# Patient Record
Sex: MALE | Race: WHITE | Employment: FULL TIME | URBAN - METROPOLITAN AREA
[De-identification: names, ages, dates, MRNs, and addresses within clinical notes are randomized per-mention and may not be internally consistent; named-entity substitution may affect disease eponyms.]

---

## 2018-09-07 ENCOUNTER — OFFICE VISIT (OUTPATIENT)
Dept: OTHER | Facility: OUTPATIENT CENTER | Age: 38
End: 2018-09-07
Payer: COMMERCIAL

## 2018-09-07 DIAGNOSIS — F91.9 DISTURBANCE OF CONDUCT: ICD-10-CM

## 2018-09-07 DIAGNOSIS — F33.0 MAJOR DEPRESSIVE DISORDER, RECURRENT EPISODE, MILD (H): Primary | ICD-10-CM

## 2018-09-07 ASSESSMENT — ANXIETY QUESTIONNAIRES
3. WORRYING TOO MUCH ABOUT DIFFERENT THINGS: SEVERAL DAYS
2. NOT BEING ABLE TO STOP OR CONTROL WORRYING: SEVERAL DAYS
7. FEELING AFRAID AS IF SOMETHING AWFUL MIGHT HAPPEN: SEVERAL DAYS
1. FEELING NERVOUS, ANXIOUS, OR ON EDGE: MORE THAN HALF THE DAYS
5. BEING SO RESTLESS THAT IT IS HARD TO SIT STILL: SEVERAL DAYS
GAD7 TOTAL SCORE: 7
6. BECOMING EASILY ANNOYED OR IRRITABLE: NOT AT ALL

## 2018-09-07 ASSESSMENT — PATIENT HEALTH QUESTIONNAIRE - PHQ9: 5. POOR APPETITE OR OVEREATING: SEVERAL DAYS
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
## 2018-09-11 ASSESSMENT — ANXIETY QUESTIONNAIRES: GAD7 TOTAL SCORE: 7

## 2018-09-11 ASSESSMENT — PATIENT HEALTH QUESTIONNAIRE - PHQ9: SUM OF ALL RESPONSES TO PHQ QUESTIONS 1-9: 6

## 2018-09-13 NOTE — PROGRESS NOTES
I did not personally see the patient but I have reviewed and agree with the assessment and plan as documented in this note.  Michelle Fowler, PhD -- Supervisor   Licensed Psychologist

## 2018-09-28 ENCOUNTER — OFFICE VISIT (OUTPATIENT)
Dept: OTHER | Facility: OUTPATIENT CENTER | Age: 38
End: 2018-09-28
Payer: COMMERCIAL

## 2018-09-28 DIAGNOSIS — F33.0 MAJOR DEPRESSIVE DISORDER, RECURRENT EPISODE, MILD (H): ICD-10-CM

## 2018-09-28 DIAGNOSIS — F91.9 DISTURBANCE OF CONDUCT: Primary | ICD-10-CM
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
## 2018-09-28 NOTE — MR AVS SNAPSHOT
After Visit Summary   2018    Shawn Camejo    MRN: [id number]           Patient Information     Date Of Birth          1980        Visit Information        Provider Department      2018 9:30 AM Lux Bravo, PhD [facility]        Today's Diagnoses     Unspecified disturbance of conduct    -  1    Major depressive disorder, recurrent episode, mild (H)           Follow-ups after your visit        Your next 10 appointments already scheduled     Oct 12, 2018  9:30 AM CDT   Individual Therapy 53+ minutes with Lux Bravo PhD   [facility] ([facility])    [address]  [address]  [facility] [address]   [phone]            Oct 26, 2018  9:30 AM CDT   Individual Therapy 53+ minutes with Lux Bravo PhD   [place] [facility] ([facility])    [address]  [address]  [facility] [address]   [phone]              Who to contact     Please call your clinic at [phone] to:    Ask questions about your health    Make or cancel appointments    Discuss your medicines    Learn about your test results    Speak to your doctor            Additional Information About Your Visit        MedAvailharTraNet'te Information     Motion Math is an electronic gateway that provides easy, online access to your medical records. With Motion Math, you can request a clinic appointment, read your test results, renew a prescription or communicate with your care team.     To sign up for [employer] Math visit the website at [email or web address]   You will be asked to enter the access code listed below, as well as some personal information. Please follow the directions to create your username and password.     Your access code is: [email or web address]  Expires: 2018  5:42 PM     Your access code will  in 90 days. If you need help or a new code, please contact your [facility] or call [phone] for  assistance.        Care EveryWhere ID     This is your Care EveryWhere ID. This could be used by other organizations to access your [place] medical records  [id number]         Blood Pressure from Last 3 Encounters:   08/14/01 110/80   04/27/01 112/72    Weight from Last 3 Encounters:   08/14/01 68.4 kg (150 lb 12 oz)   04/27/01 69.4 kg (153 lb)              We Performed the Following     Individual Psychotherapy (53+ min) [73631]        Primary Care Provider Office Phone # Fax #    Poli Mukherjee [phone]       NO INFO FOUND [address]  [place] [address]        Equal Access to Services     [facility] : Hadii aad ku hadasho Soomaali, waaxda luqadaha, qaybta kaalmada adeegyada, liya jamison . So [facility] [phone].    ATENCIÓN: Si habla español, tiene a kebede disposición servicios gratuitos de asistencia lingüística. Llame al [phone].    We comply with applicable federal civil rights laws and [place] laws. We do not discriminate on the basis of race, color, national origin, age, disability, sex, sexual orientation, or gender identity.            Thank you!     Thank you for choosing [place] [facility]  for your care. Our goal is always to provide you with excellent care. Hearing back from our patients is one way we can continue to improve our services. Please take a few minutes to complete the written survey that you may receive in the mail after your visit with us. Thank you!             Your Updated Medication List - Protect others around you: Learn how to safely use, store and throw away your medicines at www.disposemymeds.org.      Notice  As of 9/28/2018 11:59 PM    You have not been prescribed any medications.

## 2018-10-12 ENCOUNTER — OFFICE VISIT (OUTPATIENT)
Dept: OTHER | Facility: OUTPATIENT CENTER | Age: 38
End: 2018-10-12
Payer: COMMERCIAL

## 2018-10-12 DIAGNOSIS — F33.0 MAJOR DEPRESSIVE DISORDER, RECURRENT EPISODE, MILD (H): ICD-10-CM

## 2018-10-12 DIAGNOSIS — F91.8 CONDUCT DISORDER, UNDIFFERENTIATED TYPE: Primary | ICD-10-CM

## 2018-10-12 NOTE — MR AVS SNAPSHOT
After Visit Summary   10/12/2018    Shawn Camejo    MRN: [id number]           Patient Information     Date Of Birth          1980        Visit Information        Provider Department      10/12/2018 9:30 AM Lux Bravo PhD [facility]        Today's Diagnoses     Other Specified Disruptive, Impulsive-Control, and Conduct Disorder    -  1    Major depressive disorder, recurrent episode, mild (H)           Follow-ups after your visit        Your next 10 appointments already scheduled     Oct 26, 2018  9:30 AM CDT   Individual Therapy 53+ minutes with Lux Bravo PhD   [facility] ([facility])    [address]  [address]  [facility] [address]   [phone]            Nov 13, 2018  9:00 AM CST   Individual Therapy 53+ minutes with Lux Bravo PhD   [place] [facility] ([facility])    [address]  [address]  [facility] [address]   [phone]            Nov 27, 2018 10:00 AM CST   Individual Therapy 53+ minutes with Lux Bravo PhD   [place] [facility] ([facility])    [address]  [address]  [facility] [address]   [phone]            Dec 13, 2018 10:00 AM CST   Individual Therapy 53+ minutes with Lux Bravo PhD   [place] [facility] ([facility])    [address]  [address]  [facility] [address]   [phone]            Dec 28, 2018 10:00 AM CST   Individual Therapy 53+ minutes with Lux Bravo PhD   [place] [facility] ([facility])    [address]  [address]  [facility] [address]   [phone]              Who to contact     Please call your clinic at [phone] to:    Ask questions about your health    Make or cancel appointments    Discuss your medicines    Learn about your test results    Speak to your doctor            Additional Information About Your Visit        MyChart Information      DiscoveRX is an electronic gateway that provides easy, online access to your medical records. With DiscoveRX, you can request a clinic appointment, read your test results, renew a prescription or communicate with your care team.     To sign up for DiscoveRX visit the website at [email or web address]   You will be asked to enter the access code listed below, as well as some personal information. Please follow the directions to create your username and password.     Your access code is: [email or web address]  Expires: 2018  5:42 PM     Your access code will  in 90 days. If you need help or a new code, please contact your [facility] or call [phone] for assistance.        Care EveryWhere ID     This is your Care EveryWhere ID. This could be used by other organizations to access your [place] medical records  [id number]         Blood Pressure from Last 3 Encounters:   01 110/80   01 112/72    Weight from Last 3 Encounters:   01 68.4 kg (150 lb 12 oz)   01 69.4 kg (153 lb)              We Performed the Following     Individual Psychotherapy (53+ min) [09735]        Primary Care Provider Office Phone # Fax #    Poli Mukherjee [phone]       NO INFO FOUND [address] [facility]  [place] [address]        Equal Access to Services     [facility] : Hadii aad ku hadasho Soomaali, waaxda luqadaha, qaybta kaalmada adeegyada, liya jamison . So [facility] [phone].    ATENCIÓN: Si habla español, tiene a kebede disposición servicios gratuitos de asistencia lingüística. Llame al [phone].    We comply with applicable federal civil rights laws and [place] laws. We do not discriminate on the basis of race, color, national origin, age, disability, sex, sexual orientation, or gender identity.            Thank you!     Thank you for choosing [place] [facility]  for your care. Our goal is always to provide you with excellent care.  Hearing back from our patients is one way we can continue to improve our services. Please take a few minutes to complete the written survey that you may receive in the mail after your visit with us. Thank you!             Your Updated Medication List - Protect others around you: Learn how to safely use, store and throw away your medicines at www.disposemymeds.org.      Notice  As of 10/12/2018  8:14 PM    You have not been prescribed any medications.

## 2018-10-12 NOTE — PROGRESS NOTES
[place] [facility] -  Case Progress Note    Date of Service: October 12, 2018  Client Name: Shawn Camejo  YOB: 1980  MRN:  [id number]  Treating Provider: Lux Bravo, PhD  Type of Session: Individual  Present in Session: Patient only  Number of Minutes:  53    Current Symptoms/Status:  The client meets criteria for unspecified disturbance of conduct, which includes behavioral symptoms that cause clinically significant distress and impair social functioning. There appears to be evidence that client's behaviors are sexually compulsive in nature.     The client meets criteria for Major Depressive Disorder, Recurrent Episode, Mild, which includes experiencing anhedonia, worthlessness, hypersomnia, fatigue, and a depressed mood more days than not for a period of at least two weeks.      Progress Toward Treatment Goals:   The client continues to show interest and commitment towards receiving services at [facility].  The client reported difficulty identifying his boundaries.      Intervention: Modality and Response:  Client arrived to session about 20 minutes early. Fully oriented to person, place, and time. Client shared difficulty managing compulsive sexual behavior, and reported increased visits to strip clubs and greater difficulty abstaining from pornography. Client shared recent change in work schedule has contributed to increased hypersexuality, and reported stress at work has been greater since shifting from third to second shift. Client reported attending one SOCORRO group since our last session and shared feeling uncomfortable attending a mixed group. We identified four mens groups client can attend, and client agreed to attend at least three mens' groups per week prior to our next session. We reviewed stages of the sex addiction cycle and client shared experiencing preoccupation most frequently at work. We identified strategies for managing preoccupation such as texting his AA sponsor and  becoming acquainted with men in SOCORRO groups.      Assignment:  -Reflect on addictive cycle.  -Attend at least three SOCORRO groups per week prior to our next meeting.      Interactive Complexity:  N/A      Diagnosis:  312.9 Unspecified disturbance of conduct (F91.9)  296.31 Major Depressive Disorder, mild, recurrent (F33.0)      Plan / Need for Future Services:  Return for therapy in 2 weeks.         Lux Bravo, PhD  Postdoctoral Fellow

## 2018-10-26 ENCOUNTER — OFFICE VISIT (OUTPATIENT)
Dept: OTHER | Facility: OUTPATIENT CENTER | Age: 38
End: 2018-10-26
Payer: COMMERCIAL

## 2018-10-26 DIAGNOSIS — F41.1 GENERALIZED ANXIETY DISORDER: ICD-10-CM

## 2018-10-26 DIAGNOSIS — F33.0 MAJOR DEPRESSIVE DISORDER, RECURRENT EPISODE, MILD (H): ICD-10-CM

## 2018-10-26 DIAGNOSIS — F43.10 POSTTRAUMATIC STRESS DISORDER: ICD-10-CM

## 2018-10-26 DIAGNOSIS — F91.8 CONDUCT DISORDER, UNDIFFERENTIATED TYPE: Primary | ICD-10-CM

## 2018-10-26 NOTE — PROGRESS NOTES
[place] [facility] -  Case Progress Note    Date of Service: October 26, 2018  Client Name: Shawn Camejo  YOB: 1980  MRN:  [id number]  Treating Provider: Lux Bravo, PhD  Type of Session: Individual  Present in Session: Patient only  Number of Minutes:  60    Current Symptoms/Status:  The client meets criteria for Other Specified Disruptive, Impulsive-Control, and Conduct Disorder (hypersexuality), which includes behavioral symptoms that cause clinically significant distress and impair social functioning. There appears to be evidence that client's behaviors are sexually compulsive in nature.     The client meets criteria for Major Depressive Disorder, Recurrent Episode, Mild, which includes experiencing anhedonia, worthlessness, hypersomnia, fatigue, and a depressed mood more days than not for a period of at least two weeks.    Client endorses the following anxiety symptoms: difficulty controlling worry; restlessness or feeling keyed up or on edge; being easily fatigued; difficulty concentrating or mind going blank; irritability; muscle tension; and sleep disturbance (difficulty falling or staying asleep, or restless unsatisfying sleep).    The client meets criteria for Posttraumatic Stress Disorder, which includes dissociative reactions, persistent avoidance of distressing memories, negative alternations in cognitions and mood associated with the event, and marked alterations in arousal and reactivity associated with the event. The symptoms have been present for at least one month and cause clinically significant distress or impairment.      Progress Toward Treatment Goals:   The client continues to show interest and commitment towards receiving services at [facility].  The client reported gaining insight into how early traumatic experiences influences compulsive sexual behavior.      Intervention: Modality and Response:  Client arrived to session on time. Fully oriented to person, place,  "and time. Client shared difficulty managing compulsive sexual behavior, and reported increased visits to strip clubs and greater difficulty abstaining from pornography. Client reported not attending SCOORRO groups due to time management difficulties. Client connected perpetual \"fight or flight\" responses from childhood trauma to early experiences engaging in masturbation in order to return to physiological baseline. Client shared feeling \"disturbed\" by this insight and simultaneously reported gaining insight into the importance of disconnecting from individuals who continue to fuel this cycle, such as a prostitute he has been dating. Client shared feeling \"triggered\" meeting his ex-wife's friend at the grocery store and reported wanting to connect with her in order to leave the possibility of a sexual encounter open. Client reported ongoing distress surrounding his weekly visits to strip clubs. Reported feeling connected to AA sponsor who is also in recovery for compulsive sexual behaviors. Client reported he has been taking Sertraline 200mg as prescribed by outside psychiatrist who referred him to [facility] and was \"overwhelmed\" at the suggestion of meeting Bhargav Nolasco PA-C, for psychiatric referral. Client agreed to reconsider this referral at our next session.      Assignment:  -Engage social support systems.  -Attend at least three SOCORRO groups per week prior to our next meeting; consider requesting a day off work in order to attend one group and engage in self-care.      Interactive Complexity:  N/A      Diagnosis:  312.89 Other Specified Disruptive, Impulsive-Control, and Conduct Disorder (hypersexuality) (F91.8)  296.31 Major Depressive Disorder, mild, recurrent (F33.0)  300.02 Generalized Anxiety Disorder (F41.1)  309.81 Posttraumatic Stress Disorder (F43.10)      Plan / Need for Future Services:  Return for therapy in 2 weeks.         Lux Bravo, PhD  Postdoctoral Fellow  "

## 2018-10-26 NOTE — MR AVS SNAPSHOT
After Visit Summary   10/26/2018    Shawn Camejo    MRN: [id number]           Patient Information     Date Of Birth          1980        Visit Information        Provider Department      10/26/2018 9:30 AM Lux Bravo PhD [facility]        Today's Diagnoses     Other Specified Disruptive, Impulsive-Control, and Conduct Disorder (hypersexuality)    -  1    Major depressive disorder, recurrent episode, mild (H)        Generalized anxiety disorder        Posttraumatic stress disorder           Follow-ups after your visit        Your next 10 appointments already scheduled     Nov 13, 2018  9:00 AM CST   Individual Therapy 53+ minutes with Lux Bravo PhD   [facility] ([facility])    [address]  [address]  [facility] [address]   [phone]            Nov 27, 2018 10:00 AM CST   Individual Therapy 53+ minutes with Lux Bravo PhD   [facility] ([facility])    [address]  [address]  [facility] [address]   [phone]            Dec 13, 2018 10:00 AM CST   Individual Therapy 53+ minutes with Lux Bravo PhD   [facility] ([facility])    [address]  [address]  [facility] [address]   [phone]            Dec 28, 2018 10:00 AM CST   Individual Therapy 53+ minutes with Lux Bravo PhD   [facility] ([facility])    [address]  [address]  [facility] [address]   [phone]              Who to contact     Please call your clinic at [phone] to:    Ask questions about your health    Make or cancel appointments    Discuss your medicines    Learn about your test results    Speak to your doctor            Additional Information About Your Visit        Cazoodle Information     Cazoodle is an electronic gateway that provides easy, online access to your medical records. With Cazoodle, you can request a clinic  appointment, read your test results, renew a prescription or communicate with your care team.     To sign up for InCastt visit the website at [email or web address]   You will be asked to enter the access code listed below, as well as some personal information. Please follow the directions to create your username and password.     Your access code is: [email or web address]  Expires: 2019  9:26 AM     Your access code will  in 90 days. If you need help or a new code, please contact your [facility] or call [phone] for assistance.        Care EveryWhere ID     This is your Care EveryWhere ID. This could be used by other organizations to access your [place] medical records  [id number]         Blood Pressure from Last 3 Encounters:   01 110/80   01 112/72    Weight from Last 3 Encounters:   01 68.4 kg (150 lb 12 oz)   01 69.4 kg (153 lb)              We Performed the Following     Individual Psychotherapy (53+ min) [31888]        Primary Care Provider Office Phone # Fax #    Poli Mukherjee [phone]       NO INFO FOUND [address] [place] [address]  [place] [address]        Equal Access to Services     AYANA LARIOS AH: Hadii aad ku hadasho Soomaali, waaxda luqadaha, qaybta kaalmada adeegyada, waxay zarain hayrogern hero jamison . So [facility] [phone].    ATENCIÓN: Si habla español, tiene a kebede disposición servicios gratuitos de asistencia lingüística. Llame al [phone].    We comply with applicable federal civil rights laws and [place] laws. We do not discriminate on the basis of race, color, national origin, age, disability, sex, sexual orientation, or gender identity.            Thank you!     Thank you for choosing [place] [facility]  for your care. Our goal is always to provide you with excellent care. Hearing back from our patients is one way we can continue to improve our services. Please take a few minutes to complete the written survey  that you may receive in the mail after your visit with us. Thank you!             Your Updated Medication List - Protect others around you: Learn how to safely use, store and throw away your medicines at www.disposemymeds.org.      Notice  As of 10/26/2018 11:59 PM    You have not been prescribed any medications.

## 2018-11-13 ENCOUNTER — OFFICE VISIT (OUTPATIENT)
Dept: OTHER | Facility: OUTPATIENT CENTER | Age: 38
End: 2018-11-13
Payer: COMMERCIAL

## 2018-11-13 ENCOUNTER — TEAM CONFERENCE (OUTPATIENT)
Dept: OTHER | Facility: OUTPATIENT CENTER | Age: 38
End: 2018-11-13

## 2018-11-13 DIAGNOSIS — F91.8 OTHER CONDUCT DISORDERS: Primary | ICD-10-CM

## 2018-11-13 DIAGNOSIS — F43.10 POSTTRAUMATIC STRESS DISORDER: ICD-10-CM

## 2018-11-13 DIAGNOSIS — F33.0 MAJOR DEPRESSIVE DISORDER, RECURRENT EPISODE, MILD (H): ICD-10-CM

## 2018-11-13 DIAGNOSIS — F41.1 GENERALIZED ANXIETY DISORDER: ICD-10-CM

## 2018-11-13 NOTE — PROGRESS NOTES
[place] [facility] -  Case Progress Note    Date of Service: 11/13/2018  Client Name: Shawn Camejo  YOB: 1980  MRN:  [id number]  Treating Provider: Lux Bravo, PhD  Type of Session: Individual  Present in Session: Patient only  Number of Minutes:  45    Current Symptoms/Status:  The client meets criteria for Other Specified Disruptive, Impulsive-Control, and Conduct Disorder (hypersexuality), which includes behavioral symptoms that cause clinically significant distress and impair social functioning. There appears to be evidence that client's behaviors are sexually compulsive in nature.     The client meets criteria for Major Depressive Disorder, Recurrent Episode, Mild, which includes experiencing anhedonia, worthlessness, hypersomnia, fatigue, and a depressed mood more days than not for a period of at least two weeks.    Client endorses the following anxiety symptoms: difficulty controlling worry; restlessness or feeling keyed up or on edge; being easily fatigued; difficulty concentrating or mind going blank; irritability; muscle tension; and sleep disturbance (difficulty falling or staying asleep, or restless unsatisfying sleep).    The client meets criteria for Posttraumatic Stress Disorder, which includes dissociative reactions, persistent avoidance of distressing memories, negative alternations in cognitions and mood associated with the event, and marked alterations in arousal and reactivity associated with the event. The symptoms have been present for at least one month and cause clinically significant distress or impairment.      Progress Toward Treatment Goals:   The client continues to show interest and commitment towards receiving services at [facility].  The client reported difficulty controlling urges to act out and reported feeling disconnected from AA and [facility] sponsors.      Intervention: Modality and Response:  Client arrived to session 20 minutes late. Fully oriented to person,  "place, and time. Client shared ongoing difficulties with compulsive sexual behavior and reported watching Internet pornography, compulsively masturbating, and visiting strip clubs 2-3 times per week. He reported his AA sponsor is also struggling with sex addiction, and client shared feeling \"judged\" by his AA sponsor during recent interactions. Client shared wanting a sponsor to feel like a mentor and not a \"peer\", which is how he currently views his AA sponsor. Client reported having an SOCORRO sponsor but reported feeling disconnected from this person, stating his SOCORRO sponsor is like a \"grandfather\" who checks in periodically. We discussed strategies for feeling connected to specific members at his AA and SOCORRO meetings, and client reported a desire to look into new meetings. When asked about hobbies, client shared he had \"grandiose\" plans such as wanting to build a wooden boat after watching a YouTube video on the topic. Client agreed to meet with Bhargav Nolasco PA-C, for medical evaluation. Client agreed to begin meeting for individual therapy weekly in order to achieve greater stability.      Assignment:  -Attend various AA and SOCORRO groups and gauge connectedness to other members.  -Consider establishing relationship with new AA and SOCORRO sponsors.      Interactive Complexity:  N/A      Diagnosis:  312.89 Other Specified Disruptive, Impulsive-Control, and Conduct Disorder (hypersexuality) (F91.8)  296.31 Major Depressive Disorder, mild, recurrent (F33.0)  300.02 Generalized Anxiety Disorder (F41.1)  309.81 Posttraumatic Stress Disorder (F43.10)      Plan / Need for Future Services:  Return for therapy in 2 weeks. Meet with Bhargav Nolasco PA-C.     Lux Bravo, PhD  Postdoctoral Fellow  "

## 2018-11-13 NOTE — TELEPHONE ENCOUNTER
Medical/Psychiatric/Psyhological Consultation Form    Date:  2018     Name:  Shawn Camejo   Aliases:    :  1980  MRN:  [id number]    To:  Bhargav Nolasco PA-C    Reason for Consult:  Psychiatric Consultation:  Evaluation of indications for medication treatment    DSM Diagnosis:    312.89 Other Specified Disruptive, Impulsive-Control, and Conduct Disorder (hypersexuality) (F91.8)  296.31 Major Depressive Disorder, mild, recurrent (F33.0)  300.02 Generalized Anxiety Disorder (F41.1)  309.81 Posttraumatic Stress Disorder (F43.10)    Consult reason / list test to be given/interpretted and additional information:  Client has had difficulties controlling compulsive sexual behaviors over the last month or so. Reports going to strip clubs, hiring prostitutes, watching Internet pornography, and compulsive masturbation. Engages in CSB 2-3x/week. Reports persistent feelings of isolation from others, which exacerbates CSB. Referring for medication eval and to discuss possibility of Naltrexone.

## 2018-11-13 NOTE — MR AVS SNAPSHOT
After Visit Summary   11/13/2018    Shawn Camejo    MRN: [id number]           Patient Information     Date Of Birth          1980        Visit Information        Provider Department      11/13/2018 9:00 AM Lux Bravo PhD [facility]        Today's Diagnoses     Other Specified Disruptive, Impulsive-Control, and Conduct Disorder (hypersexuality)    -  1    Generalized anxiety disorder        Major depressive disorder, recurrent episode, mild (H)        Posttraumatic stress disorder           Follow-ups after your visit        Your next 10 appointments already scheduled     Nov 27, 2018 10:00 AM CST   Individual Therapy 53+ minutes with Lux Bravo PhD   [facility] ([facility])    [address]  [address]  [facility] [address]   [phone]            Dec 04, 2018  9:00 AM CST   Individual Therapy 53+ minutes with Lux Bravo PhD   [place] [facility] ([facility])    [address]  [address]  [facility] [address]   [phone]            Dec 13, 2018 10:00 AM CST   Individual Therapy 53+ minutes with Lux Bravo PhD   [place] [facility] ([facility])    [address]  [address]  [facility] [address]   [phone]            Dec 18, 2018 10:00 AM CST   Individual Therapy 53+ minutes with Lux Bravo PhD   [place] [facility] ([facility])    [address]  [address]  [facility] [address]   [phone]            Dec 28, 2018 10:00 AM CST   Individual Therapy 53+ minutes with Lux Bravo PhD   [place] [facility] ([facility])    [address]  [address]  [facility] [address]   [phone]            Jan 03, 2019 11:00 AM CST   Individual Therapy 53+ minutes with Lux Bravo PhD   [facility])    [address]  [address]  [facility] [address]    [phone]            2019  6:30 PM CST   PSYCH EVALUATION with Bhargav Nolasco PA-C   [facility] ([facility])    [address]  [address]  [facility] [address]   [phone]            Frankie 10, 2019 11:00 AM CST   Individual Therapy 53+ minutes with Lux Bravo PhD   [place] [facility] ([facility])    [address]  [address]  [facility] [address]   [phone]            2019 11:00 AM CST   Individual Therapy 53+ minutes with Lux Bravo PhD   [place] [facility] ([facility])    [address]  [address]  [facility] [address]   [phone]            2019 11:00 AM CST   Individual Therapy 53+ minutes with Lux Bravo PhD   [place] [facility] ([facility])    [address]  [address]  [facility] [address]   [phone]              Who to contact     Please call your clinic at [phone] to:    Ask questions about your health    Make or cancel appointments    Discuss your medicines    Learn about your test results    Speak to your doctor            Additional Information About Your Visit        "[employer]"hart Information     dELiAst is an electronic gateway that provides easy, online access to your medical records. With raksul, you can request a clinic appointment, read your test results, renew a prescription or communicate with your care team.     To sign up for dELiAst visit the website at [email or web address]   You will be asked to enter the access code listed below, as well as some personal information. Please follow the directions to create your username and password.     Your access code is: [email or web address]  Expires: 2019  8:26 AM     Your access code will  in 90 days. If you need help or a new code, please contact your [facility] or call [phone] for assistance.        Care EveryWhere ID     This  is your Care EveryWhere ID. This could be used by other organizations to access your [place] medical records  [id number]         Blood Pressure from Last 3 Encounters:   08/14/01 110/80   04/27/01 112/72    Weight from Last 3 Encounters:   08/14/01 68.4 kg (150 lb 12 oz)   04/27/01 69.4 kg (153 lb)              We Performed the Following     Individual Psychotherapy (38-52 min) [46360]        Primary Care Provider Office Phone # Fax #    Poli Mukherjee [phone]       NO INFO FOUND [address]  [place] [address]        Equal Access to Services     [facility]: Hadii aad ku hadasho Soomaali, waaxda luqadaha, qaybta kaalmada adeegyada, liya jamison . So [facility] [phone].    ATENCIÓN: Si habla español, tiene a kebede disposición servicios gratuitos de asistencia lingüística. Llame al [phone].    We comply with applicable federal civil rights laws and [place] laws. We do not discriminate on the basis of race, color, national origin, age, disability, sex, sexual orientation, or gender identity.            Thank you!     Thank you for choosing [place] [facility]  for your care. Our goal is always to provide you with excellent care. Hearing back from our patients is one way we can continue to improve our services. Please take a few minutes to complete the written survey that you may receive in the mail after your visit with us. Thank you!             Your Updated Medication List - Protect others around you: Learn how to safely use, store and throw away your medicines at www.disposemymeds.org.      Notice  As of 11/13/2018 11:59 PM    You have not been prescribed any medications.

## 2018-11-27 ENCOUNTER — OFFICE VISIT (OUTPATIENT)
Dept: OTHER | Facility: OUTPATIENT CENTER | Age: 38
End: 2018-11-27
Payer: COMMERCIAL

## 2018-11-27 DIAGNOSIS — F41.1 GENERALIZED ANXIETY DISORDER: ICD-10-CM

## 2018-11-27 DIAGNOSIS — F91.8 OTHER CONDUCT DISORDERS: Primary | ICD-10-CM

## 2018-11-27 DIAGNOSIS — F33.0 MAJOR DEPRESSIVE DISORDER, RECURRENT EPISODE, MILD (H): ICD-10-CM

## 2018-11-27 DIAGNOSIS — F43.10 POSTTRAUMATIC STRESS DISORDER: ICD-10-CM

## 2018-11-27 NOTE — PROGRESS NOTES
[place] [facility] -  Case Progress Note    Date of Service: 11/27/2018  Client Name: Shawn Camejo  YOB: 1980  MRN:  [id number]  Treating Provider: Lux Bravo, PhD  Type of Session: Individual  Present in Session: Patient only  Number of Minutes:  40    Health Maintenance Summary - Mental Health Treatment Plan       Status Date      Mental Health Tx Plan Q11 MOS Next Due 8/28/2019      Done 9/28/2018         Current Symptoms/Status:  The client meets criteria for Other Specified Disruptive, Impulsive-Control, and Conduct Disorder (hypersexuality), which includes behavioral symptoms that cause clinically significant distress and impair social functioning. There appears to be evidence that client's behaviors are sexually compulsive in nature.     The client meets criteria for Major Depressive Disorder, Recurrent Episode, Mild, which includes experiencing anhedonia, worthlessness, hypersomnia, fatigue, and a depressed mood more days than not for a period of at least two weeks.    Client endorses the following anxiety symptoms: difficulty controlling worry; restlessness or feeling keyed up or on edge; being easily fatigued; difficulty concentrating or mind going blank; irritability; muscle tension; and sleep disturbance (difficulty falling or staying asleep, or restless unsatisfying sleep).    The client meets criteria for Posttraumatic Stress Disorder, which includes dissociative reactions, persistent avoidance of distressing memories, negative alternations in cognitions and mood associated with the event, and marked alterations in arousal and reactivity associated with the event. The symptoms have been present for at least one month and cause clinically significant distress or impairment.      Progress Toward Treatment Goals:   The client continues to show interest and commitment towards receiving services at [facility].  The client reported difficulty controlling urges to act out over  "Thanksgiving.      Intervention: Modality and Response:  Client arrived to session 20 minutes late. Fully oriented to person, place, and time. Client shared ongoing difficulties with compulsive sexual behavior and reported watching Internet pornography, gambling at casinos, and hiring a prostitute over the Thanksgiving weekend. Client reported installing a parental blocker that blocks pornography on his phone at the end of the weekend and reported two members of an SOCORRO meeting control these blockers. Client reported this strategy has worked so far and he has not yet attempted to circumvent the blocker. Client reported difficulty identifying what \"healthy\" looks like for him and elaborated that he has never not experienced sex addiction. We revisited boundaries and he identified having sex outside of marriage as one of his boundaries. Client identified two SOCORRO meetings and two AA meetings to attend weekly, and client reported increased connection with his SOCORRO sponsor. Client reported intention of attending an SOCORRO meeting immediately after our session.      Assignment:  -Attend two AA meetings and two SOCORRO meetings each week.  -Consider establishing relationship with new AA sponsor.      Interactive Complexity:  N/A      Diagnosis:  312.89 Other Specified Disruptive, Impulsive-Control, and Conduct Disorder (hypersexuality) (F91.8)  296.31 Major Depressive Disorder, mild, recurrent (F33.0)  300.02 Generalized Anxiety Disorder (F41.1)  309.81 Posttraumatic Stress Disorder (F43.10)      Plan / Need for Future Services:  Return for therapy in one week.     Lux Bravo, PhD  Postdoctoral Fellow  "

## 2018-11-27 NOTE — MR AVS SNAPSHOT
After Visit Summary   11/27/2018    Shawn Camejo    MRN: [id number]           Patient Information     Date Of Birth          1980        Visit Information        Provider Department      11/27/2018 10:00 AM Lux Bravo PhD [facility]        Today's Diagnoses     Other Specified Disruptive, Impulsive-Control, and Conduct Disorder (hypersexuality)    -  1    Major depressive disorder, recurrent episode, mild (H)        Generalized anxiety disorder        Posttraumatic stress disorder           Follow-ups after your visit        Your next 10 appointments already scheduled     Dec 04, 2018  9:00 AM CST   Individual Therapy 53+ minutes with Lux Bravo PhD   [facility] ([facility])    [address]  [address]  [facility] [address]   [phone]            Dec 13, 2018 10:00 AM CST   Individual Therapy 53+ minutes with Lux Bravo PhD   [facility] ([facility])    [address]  [address]  [facility] [address]   [phone]            Dec 18, 2018 10:00 AM CST   Individual Therapy 53+ minutes with Lux Bravo PhD   [facility] ([facility])    [address]  [address]  [facility] [address]   [phone]            Dec 28, 2018 10:00 AM CST   Individual Therapy 53+ minutes with Lux Bravo PhD   [facility] ([facility])    [address]  [address]  [facility] [address]   [phone]            Jan 03, 2019 11:00 AM CST   Individual Therapy 53+ minutes with Lux Bravo PhD   [facility] ([facility])    [address]  [address]  [facility] [address]   [phone]            Jan 03, 2019  6:30 PM CST   PSYCH EVALUATION with Bhargav Nolasco PA-C   [facility] ([facility])    [address]  [address]  [facility] [address]   [phone]             Frankie 10, 2019 11:00 AM CST   Individual Therapy 53+ minutes with Lux Bravo PhD   [facility] ([facility])    [address]  [address]  [facility] [address]   [phone]            2019 11:00 AM CST   Individual Therapy 53+ minutes with Lux Bravo PhD   [facility] ([facility])    [address]  [address]  [facility] [address]   [phone]            2019 11:00 AM CST   Individual Therapy 53+ minutes with Lux Bravo PhD   [facility] ([facility])    [address]  [address]  [facility] [address]   [phone]            2019 11:00 AM CST   Individual Therapy 53+ minutes with Lux Bravo PhD   [facility] ([facility])    [address]  [address]  [facility] [address]   [phone]              Who to contact     Please call your clinic at [phone] to:    Ask questions about your health    Make or cancel appointments    Discuss your medicines    Learn about your test results    Speak to your doctor            Additional Information About Your Visit        BTI Systemshart Information     INFIMET is an electronic gateway that provides easy, online access to your medical records. With INFIMET, you can request a clinic appointment, read your test results, renew a prescription or communicate with your care team.     To sign up for [facility] visit the website at [email or web address]   You will be asked to enter the access code listed below, as well as some personal information. Please follow the directions to create your username and password.     Your access code is: [email or web address]  Expires: 2019  8:26 AM     Your access code will  in 90 days. If you need help or a new code, please contact your [facility] or call [phone] for assistance.        Care EveryWhere ID     This is  your Care EveryWhere ID. This could be used by other organizations to access your [facility] medical records  [id number]         Blood Pressure from Last 3 Encounters:   08/14/01 110/80   04/27/01 112/72    Weight from Last 3 Encounters:   08/14/01 68.4 kg (150 lb 12 oz)   04/27/01 69.4 kg (153 lb)              We Performed the Following     Individual Psychotherapy (38-52 min) [02347]        Primary Care Provider Office Phone # Fax #    Poli Mukherjee [phone]       NO INFO FOUND [address]  [place] [address]        Equal Access to Services     [facility]: Hadii aad ku hadasho Soomaali, waaxda luqadaha, qaybta kaalmada adeegyada, liya argueta adeparadise jamison . So [facility] [phone].    ATENCIÓN: Si habla español, tiene a kebede disposición servicios gratuitos de asistencia lingüística. Llame al [phone].    We comply with applicable federal civil rights laws and [place] laws. We do not discriminate on the basis of race, color, national origin, age, disability, sex, sexual orientation, or gender identity.            Thank you!     Thank you for choosing [facility]  for your care. Our goal is always to provide you with excellent care. Hearing back from our patients is one way we can continue to improve our services. Please take a few minutes to complete the written survey that you may receive in the mail after your visit with us. Thank you!             Your Updated Medication List - Protect others around you: Learn how to safely use, store and throw away your medicines at www.disposemymeds.org.      Notice  As of 11/27/2018 11:59 PM    You have not been prescribed any medications.

## 2018-12-04 ENCOUNTER — OFFICE VISIT (OUTPATIENT)
Dept: OTHER | Facility: OUTPATIENT CENTER | Age: 38
End: 2018-12-04
Payer: COMMERCIAL

## 2018-12-04 DIAGNOSIS — F41.1 GENERALIZED ANXIETY DISORDER: ICD-10-CM

## 2018-12-04 DIAGNOSIS — F91.8 OTHER CONDUCT DISORDERS: Primary | ICD-10-CM

## 2018-12-04 DIAGNOSIS — F33.0 MAJOR DEPRESSIVE DISORDER, RECURRENT EPISODE, MILD (H): ICD-10-CM

## 2018-12-04 DIAGNOSIS — F43.10 POSTTRAUMATIC STRESS DISORDER: ICD-10-CM

## 2018-12-04 NOTE — MR AVS SNAPSHOT
After Visit Summary   12/4/2018    Shawn Cmaejo    MRN: [id number]           Patient Information     Date Of Birth          1980        Visit Information        Provider Department      12/4/2018 9:00 AM Lux Bravo PhD [facility]        Today's Diagnoses     Other Specified Disruptive, Impulsive-Control, and Conduct Disorder (hypersexuality)    -  1    Posttraumatic stress disorder        Generalized anxiety disorder        Major depressive disorder, recurrent episode, mild (H)           Follow-ups after your visit        Your next 10 appointments already scheduled     Dec 13, 2018 10:00 AM CST   Individual Therapy 53+ minutes with Lux Bravo PhD   [facility]    [address]  [address]  [facility] [address]   [phone]            Dec 18, 2018 10:00 AM CST   Individual Therapy 53+ minutes with Lux Bravo PhD   [facility] [facility] ([facility])    [address]  [address]  [facility] [address]   [phone]            Dec 28, 2018 10:00 AM CST   Individual Therapy 53+ minutes with Lux Bravo PhD   [facility] ([facility]    [address]  [address]  [facility] [address]   [phone]            Jan 03, 2019 11:00 AM CST   Individual Therapy 53+ minutes with Lux Bravo PhD   [facility] ([facility])    [address]  [address]  [facility] [address]   [phone]            Jan 03, 2019  6:30 PM CST   PSYCH EVALUATION with Bhargav Nolasco PA-C   [facility]    [address]  [address]  [facility] [address]   [phone]            Frankie 10, 2019 11:00 AM CST   Individual Therapy 53+ minutes with Lux Bravo PhD   [facility]    [address]  [address]  [facility] [address]   [phone]             2019 11:00 AM CST   Individual Therapy 53+ minutes with Lux Bravo PhD   [place] [facility] ([facility])    [address]  [address]  [facility] [address]   [phone]            2019 11:00 AM CST   Individual Therapy 53+ minutes with Lux Bravo PhD   [facility] ([facility])    [address]  [address]  [facility] [address]   [phone]            2019 11:00 AM CST   Individual Therapy 53+ minutes with Lux Bravo PhD   [facility] ([facility])    [address]  [address]  [facility] [address]   [phone]              Who to contact     Please call your clinic at [phone] to:    Ask questions about your health    Make or cancel appointments    Discuss your medicines    Learn about your test results    Speak to your doctor            Additional Information About Your Visit        Smacktive.comhart Information     Nimsoft is an electronic gateway that provides easy, online access to your medical records. With Nimsoft, you can request a clinic appointment, read your test results, renew a prescription or communicate with your care team.     To sign up for [employer] visit the website at [email or web address]   You will be asked to enter the access code listed below, as well as some personal information. Please follow the directions to create your username and password.     Your access code is: [email or web address]  Expires: 2019  8:26 AM     Your access code will  in 90 days. If you need help or a new code, please contact your [facility] or call [phone] for assistance.        Care EveryWhere ID     This is your Care EveryWhere ID. This could be used by other organizations to access your [facility] medical records  [id number]         Blood Pressure from Last 3 Encounters:   01 110/80   01 112/72    Weight from Last 3  Encounters:   08/14/01 68.4 kg (150 lb 12 oz)   04/27/01 69.4 kg (153 lb)              We Performed the Following     Individual Psychotherapy (53+ min) [[id number]]        Primary Care Provider Office Phone # Fax #    Poli Mukherjee [phone]       NO INFO FOUND [address]  [place] [address]        Equal Access to Services     [facility]: Hadii aad ku hadasho Soomaali, waaxda luqadaha, qaybta kaalmada adeegyada, waxay idiin hayaan adeeg kharash la'aan ah. So [facility] [phone].    ATENCIÓN: Si habla español, tiene a kebede disposición servicios gratuitos de asistencia lingüística. Llame al [phone].    We comply with applicable federal civil rights laws and [place] laws. We do not discriminate on the basis of race, color, national origin, age, disability, sex, sexual orientation, or gender identity.            Thank you!     Thank you for choosing [facility]  for your care. Our goal is always to provide you with excellent care. Hearing back from our patients is one way we can continue to improve our services. Please take a few minutes to complete the written survey that you may receive in the mail after your visit with us. Thank you!             Your Updated Medication List - Protect others around you: Learn how to safely use, store and throw away your medicines at www.disposemymeds.org.      Notice  As of 12/4/2018 11:39 AM    You have not been prescribed any medications.
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
## 2018-12-13 ENCOUNTER — OFFICE VISIT (OUTPATIENT)
Dept: OTHER | Facility: OUTPATIENT CENTER | Age: 38
End: 2018-12-13
Payer: COMMERCIAL

## 2018-12-13 DIAGNOSIS — F41.1 GENERALIZED ANXIETY DISORDER: ICD-10-CM

## 2018-12-13 DIAGNOSIS — F33.0 MAJOR DEPRESSIVE DISORDER, RECURRENT EPISODE, MILD (H): ICD-10-CM

## 2018-12-13 DIAGNOSIS — F91.8 OTHER CONDUCT DISORDERS: Primary | ICD-10-CM

## 2018-12-13 DIAGNOSIS — F43.10 POSTTRAUMATIC STRESS DISORDER: ICD-10-CM

## 2018-12-13 NOTE — PROGRESS NOTES
[facility] -  Case Progress Note    Date of Service: 12/13/18  Client Name: Shawn Camejo  YOB: 1980  MRN:  [id number]  Treating Provider: Lux Bravo, PhD  Type of Session: Individual  Present in Session: Patient only  Number of Minutes:  55    Health Maintenance Summary - Mental Health Treatment Plan       Status Date      Mental Health Tx Plan Q11 MOS Next Due 8/28/2019      Done 9/28/2018         Current Symptoms/Status:  The client meets criteria for Other Specified Disruptive, Impulsive-Control, and Conduct Disorder (hypersexuality), which includes behavioral symptoms that cause clinically significant distress and impair social functioning. There appears to be evidence that client's behaviors are sexually compulsive in nature.     The client meets criteria for Major Depressive Disorder, Recurrent Episode, Mild, which includes experiencing anhedonia, worthlessness, hypersomnia, fatigue, and a depressed mood more days than not for a period of at least two weeks.    Client endorses the following anxiety symptoms: difficulty controlling worry; restlessness or feeling keyed up or on edge; being easily fatigued; difficulty concentrating or mind going blank; irritability; muscle tension; and sleep disturbance (difficulty falling or staying asleep, or restless unsatisfying sleep).    The client meets criteria for Posttraumatic Stress Disorder, which includes dissociative reactions, persistent avoidance of distressing memories, negative alternations in cognitions and mood associated with the event, and marked alterations in arousal and reactivity associated with the event. The symptoms have been present for at least one month and cause clinically significant distress or impairment.      Progress Toward Treatment Goals:   The client continues to show interest and commitment towards receiving services at [facility].  The client has an upcoming appointment with Bhargav Nolasco PA-C, on  "1/3/19.      Intervention: Modality and Response:  Client arrived to session on time. Fully oriented to person, place, and time. Therapist employed client-centered and CBT interventions to explore client's compulsive sexual behaviors and ongoing mental health concerns. Client reported a \"good and bad\" week as evidenced by occasionally withholding from CSB behaviors when triggered but still viewing pornography on his phone on a regular basis. Client shared a yearning to connect with his family, most of who he has not spoken to in over eight years, and we examined underlying codependent processes facilitating this thought process. He shared feeling copious amounts of \"shame\" from most family members growing up. Client reported not completing homework assignment of asking HR to adapt his schedule so that he may attend CSB group. However, client appeared more confident in his ability to approach HR this week, and therapist wrote down the times of all three CSB groups as a reminder. Client has an appointment with Bhargav Nolasco PA-C, on 1/3/19, and still needs to ask work for time off to attend this appointment.      Assignment:  -Continue attending [facility] meetings.   -Ask HR or supervisor for permission to attend meeting with Bhargav Nolasco PA-C, and weekly CSB groups.      Interactive Complexity:  N/A      Diagnosis:  312.89 Other Specified Disruptive, Impulsive-Control, and Conduct Disorder (hypersexuality) (F91.8)  296.31 Major Depressive Disorder, mild, recurrent (F33.0)  300.02 Generalized Anxiety Disorder (F41.1)  309.81 Posttraumatic Stress Disorder (F43.10)      Plan / Need for Future Services:  Return for therapy in one week.     Lux Bravo, PhD  Postdoctoral Fellow  "

## 2018-12-18 ENCOUNTER — OFFICE VISIT (OUTPATIENT)
Dept: OTHER | Facility: OUTPATIENT CENTER | Age: 38
End: 2018-12-18
Payer: COMMERCIAL

## 2018-12-18 DIAGNOSIS — F43.10 POSTTRAUMATIC STRESS DISORDER: ICD-10-CM

## 2018-12-18 DIAGNOSIS — F91.8 OTHER CONDUCT DISORDERS: Primary | ICD-10-CM

## 2018-12-18 DIAGNOSIS — F33.0 MAJOR DEPRESSIVE DISORDER, RECURRENT EPISODE, MILD (H): ICD-10-CM

## 2018-12-18 DIAGNOSIS — F41.1 GENERALIZED ANXIETY DISORDER: ICD-10-CM

## 2018-12-18 NOTE — PROGRESS NOTES
[facility] -  Case Progress Note    Date of Service: 12/18/18  Client Name: Shawn Camejo  YOB: 1980  MRN:  [id number]  Treating Provider: Lux Bravo, PhD  Type of Session: Individual  Present in Session: Patient only  Number of Minutes:  53    Health Maintenance Summary - Mental Health Treatment Plan       Status Date      Mental Health Tx Plan Q11 MOS Next Due 8/28/2019      Done 9/28/2018         Current Symptoms/Status:  The client meets criteria for Other Specified Disruptive, Impulsive-Control, and Conduct Disorder (hypersexuality), which includes behavioral symptoms that cause clinically significant distress and impair social functioning. There appears to be evidence that client's behaviors are sexually compulsive in nature.     The client meets criteria for Major Depressive Disorder, Recurrent Episode, Mild, which includes experiencing anhedonia, worthlessness, hypersomnia, fatigue, and a depressed mood more days than not for a period of at least two weeks.    Client endorses the following anxiety symptoms: difficulty controlling worry; restlessness or feeling keyed up or on edge; being easily fatigued; difficulty concentrating or mind going blank; irritability; muscle tension; and sleep disturbance (difficulty falling or staying asleep, or restless unsatisfying sleep).    The client meets criteria for Posttraumatic Stress Disorder, which includes dissociative reactions, persistent avoidance of distressing memories, negative alternations in cognitions and mood associated with the event, and marked alterations in arousal and reactivity associated with the event. The symptoms have been present for at least one month and cause clinically significant distress or impairment.      Progress Toward Treatment Goals:   The client continues to show interest and commitment towards receiving services at [facility].  The client has an upcoming appointment with Bhargav Nolasco PA-C, on  "1/3/19.      Intervention: Modality and Response:  Client arrived to session on time. Fully oriented to person, place, and time. Therapist employed client-centered and CBT interventions to explore client's compulsive sexual behaviors and ongoing mental health concerns. Client reported a \"really bad\" week marked by hiring a prostitute hours after our last session, visiting a massage parlor, and regularly viewing Internet pornography. He reported excessive \"guilt and shame\" surrounding these behaviors, and therapist named his guilt and shame process as justifying continued CSB. Client reported not asking work for time off to attend CSB group, and we identified a strategy for how he will ask HR off from work. We role played client asking HR off from work, and client shared feeling comfortable with this role play. Client reported plan to attend [facility] group immediately after session and intends to get to work early today to ask HR to adjust his Monday schedule so that he may attend the Monday CSB group.      Assignment:  -Continue attending [facility] meetings.   -Ask HR or supervisor for permission to attend meeting with Bhargav Nolasco PA-C, and weekly CSB groups.      Interactive Complexity:  N/A      Diagnosis:  312.89 Other Specified Disruptive, Impulsive-Control, and Conduct Disorder (hypersexuality) (F91.8)  296.31 Major Depressive Disorder, mild, recurrent (F33.0)  300.02 Generalized Anxiety Disorder (F41.1)  309.81 Posttraumatic Stress Disorder (F43.10)      Plan / Need for Future Services:  Return for therapy in one week.     Lux Bravo, PhD  Postdoctoral Fellow  "

## 2018-12-28 ENCOUNTER — OFFICE VISIT (OUTPATIENT)
Dept: OTHER | Facility: OUTPATIENT CENTER | Age: 38
End: 2018-12-28
Payer: COMMERCIAL

## 2018-12-28 DIAGNOSIS — F41.1 GENERALIZED ANXIETY DISORDER: ICD-10-CM

## 2018-12-28 DIAGNOSIS — F33.0 MAJOR DEPRESSIVE DISORDER, RECURRENT EPISODE, MILD (H): ICD-10-CM

## 2018-12-28 DIAGNOSIS — F91.8 OTHER CONDUCT DISORDERS: Primary | ICD-10-CM

## 2018-12-28 DIAGNOSIS — F43.10 POSTTRAUMATIC STRESS DISORDER: ICD-10-CM

## 2018-12-28 NOTE — PROGRESS NOTES
[facility] -  Case Progress Note    Date of Service: 12/28/18  Client Name: Shawn Camejo  YOB: 1980  MRN:  [id number]  Treating Provider: Lux Bravo, PhD  Type of Session: Individual  Present in Session: Patient only  Number of Minutes:  53    Health Maintenance Summary - Mental Health Treatment Plan       Status Date      Mental Health Tx Plan Q11 MOS Next Due 8/28/2019      Done 9/28/2018         Current Symptoms/Status:  The client meets criteria for Other Specified Disruptive, Impulsive-Control, and Conduct Disorder (hypersexuality), which includes behavioral symptoms that cause clinically significant distress and impair social functioning. There appears to be evidence that client's behaviors are sexually compulsive in nature.     The client meets criteria for Major Depressive Disorder, Recurrent Episode, Mild, which includes experiencing anhedonia, worthlessness, hypersomnia, fatigue, and a depressed mood more days than not for a period of at least two weeks.    Client endorses the following anxiety symptoms: difficulty controlling worry; restlessness or feeling keyed up or on edge; being easily fatigued; difficulty concentrating or mind going blank; irritability; muscle tension; and sleep disturbance (difficulty falling or staying asleep, or restless unsatisfying sleep).    The client meets criteria for Posttraumatic Stress Disorder, which includes dissociative reactions, persistent avoidance of distressing memories, negative alternations in cognitions and mood associated with the event, and marked alterations in arousal and reactivity associated with the event. The symptoms have been present for at least one month and cause clinically significant distress or impairment.      Progress Toward Treatment Goals:   The client continues to show interest and commitment towards receiving services at [facility].  The client has an upcoming appointment with Bhargav Nolasco PA-C, on  "1/3/19.      Intervention: Modality and Response:  Client arrived to session on time. Fully oriented to person, place, and time. Therapist employed client-centered and CBT interventions to explore client's compulsive sexual behaviors and ongoing mental health concerns. Client reported continued boundary violations and acting out over the last week, marked by hiring a prostitute, going to strip clubs twice, gambling most of his bank account away, and consuming alcohol. Client reported he is \"not taking my recovery seriously\" and interventions were aimed at exploring client's motivation for stopping addictive behaviors. Client reported he will wind up \"dying\" if he does not get his addictions under control but simultaneously reported difficulty giving up his addictions because of the pleasure that comes with acting out. Client reported not following up with work to ask for time off to attend CSB group, and he reported intention of doing this when he returns to work on 1/2/19. Client has an upcoming meeting with Bhargav Nolasco PA-C, on 1/3/19, and we discussed the benefit of possibly beginning Naltrexone.      Assignment:  -Attend [employer] or AA meeting every day until he returns to work on 1/2/19.  -Ask HR or supervisor for permission to attend meeting with Bhargav Nolasco PA-C, and weekly CSB groups.      Interactive Complexity:  N/A      Diagnosis:  312.89 Other Specified Disruptive, Impulsive-Control, and Conduct Disorder (hypersexuality) (F91.8)  296.31 Major Depressive Disorder, mild, recurrent (F33.0)  300.02 Generalized Anxiety Disorder (F41.1)  309.81 Posttraumatic Stress Disorder (F43.10)      Plan / Need for Future Services:  Return for therapy in one week.     Lux Bravo, PhD  Postdoctoral Fellow  "

## 2019-01-03 ENCOUNTER — OFFICE VISIT (OUTPATIENT)
Dept: OTHER | Facility: OUTPATIENT CENTER | Age: 39
End: 2019-01-03
Payer: COMMERCIAL

## 2019-01-03 DIAGNOSIS — F43.10 POSTTRAUMATIC STRESS DISORDER: ICD-10-CM

## 2019-01-03 DIAGNOSIS — F91.8 OTHER CONDUCT DISORDERS: Primary | ICD-10-CM

## 2019-01-03 DIAGNOSIS — F33.0 MAJOR DEPRESSIVE DISORDER, RECURRENT EPISODE, MILD (H): ICD-10-CM

## 2019-01-03 DIAGNOSIS — F41.1 GENERALIZED ANXIETY DISORDER: ICD-10-CM

## 2019-01-03 RX ORDER — NALTREXONE HYDROCHLORIDE 50 MG/1
50 TABLET, FILM COATED ORAL DAILY
Qty: 90 TABLET | Refills: 3 | Status: SHIPPED | OUTPATIENT
Start: 2019-01-03 | End: 2019-01-03 | Stop reason: DRUGHIGH

## 2019-01-03 RX ORDER — NALTREXONE HYDROCHLORIDE 50 MG/1
TABLET, FILM COATED ORAL
Qty: 30 TABLET | Refills: 3 | Status: SHIPPED | OUTPATIENT
Start: 2019-01-03 | End: 2019-08-22

## 2019-01-03 NOTE — PROGRESS NOTES
[facility] -  Case Progress Note    Date of Service: 1/03/19  Client Name: Shawn Camejo  YOB: 1980  MRN:  [id number]  Treating Provider: Lux Bravo, PhD  Type of Session: Individual  Present in Session: Patient only  Number of Minutes:  53    Health Maintenance Summary - Mental Health Treatment Plan       Status Date      Mental Health Tx Plan Q11 MOS Next Due 8/28/2019      Done 9/28/2018         Current Symptoms/Status:  The client meets criteria for Other Specified Disruptive, Impulsive-Control, and Conduct Disorder (hypersexuality), which includes behavioral symptoms that cause clinically significant distress and impair social functioning. There appears to be evidence that client's behaviors are sexually compulsive in nature.     The client meets criteria for Major Depressive Disorder, Recurrent Episode, Mild, which includes experiencing anhedonia, worthlessness, hypersomnia, fatigue, and a depressed mood more days than not for a period of at least two weeks.    Client endorses the following anxiety symptoms: difficulty controlling worry; restlessness or feeling keyed up or on edge; being easily fatigued; difficulty concentrating or mind going blank; irritability; muscle tension; and sleep disturbance (difficulty falling or staying asleep, or restless unsatisfying sleep).    The client meets criteria for Posttraumatic Stress Disorder, which includes dissociative reactions, persistent avoidance of distressing memories, negative alternations in cognitions and mood associated with the event, and marked alterations in arousal and reactivity associated with the event. The symptoms have been present for at least one month and cause clinically significant distress or impairment.      Progress Toward Treatment Goals:   The client continues to show interest and commitment towards receiving services at [facility].  The client has an upcoming appointment with Bhargav Nolasco PA-C, on  "1/3/19.  The client reported decreased CSB over the last week.      Intervention: Modality and Response:  Client fully oriented to person, place, and time. Therapist employed client-centered and CBT interventions to explore client's compulsive sexual behaviors and ongoing mental health concerns. Client shared progress toward goals as evidenced by attending numerous [employer] meetings and spending an entire day with his [employer] sponsor at meetings, the gym, and a coffee shop. He reported anxiety that he will soon relapse given his progress over the last week, and we identified protective factors should he anticipate a relapse. Client identified numerous individuals in his [employer] groups as people he can reach out to. Client reported not following up with work to ask for time off to attend CSB group, and acknowledged during this session his tendency to \"put it off\" because it feels like a large laurel. We processed difficulty taking this step, and he reported increased confidence that he can have a conversation with HR tomorrow about beginning the Monday CSB group in February.      Assignment:  -Remain connected with support from SOC[facility]RO and AA.  -Ask HR or supervisor for permission to attend weekly CSB groups.      Interactive Complexity:  N/A      Diagnosis:  312.89 Other Specified Disruptive, Impulsive-Control, and Conduct Disorder (hypersexuality) (F91.8)  296.31 Major Depressive Disorder, mild, recurrent (F33.0)  300.02 Generalized Anxiety Disorder (F41.1)  309.81 Posttraumatic Stress Disorder (F43.10)      Plan / Need for Future Services:  Return for therapy in one week.     Lux Bravo, PhD  Postdoctoral Fellow  "

## 2019-01-04 NOTE — PROGRESS NOTES
Shawn Camejo                                                                                                  MRN# [id number]  1/3/2019                                                                                                        1980    Psychiatric Evaluation      Identifying Data: Ramon Camejo is a 37 yo male presenting for psychiatric evaluation, referred by Dr. Bravo. Information is obtained via clinical interview, use of rating scales and review of available medical/psychiatric/psychologic records. The patient presented individually, on time and appeared to be a reliable historian.         History of Present Illness: Ramon states that he had a relapse on alcohol 1 week ago, and has been sober since then. He states that he has been in contact with his sponsor and attending meetings.     CSB: Ramon states that his viewing of pornography is daily, 1-2 hours.  He masturbates to orgasm 3-4 times/week. In the last month he reports weekly utilization of prostitutes and/or strip clubs. He states that one of his triggers is the weekend, in order to deal with lonlieness and lack of structure.      Gambling: Ramon states that he has spent around $1000 in casinos over the last 2-3 months. He does not recall prior issues with gambling to that degree.     Depression: Ramon describes anhedonia, feelings of guilt/worthlessness.  He denies suicidal/homicidal ideation, active planning, intent or means. He states that he has had improvement and relative stability on sertraline prescribed by Dr. Daugherty.     Psychosis: Ramon denies A/V hallucinations, delusions, ideas of reference, paranoia    Trauma: Describes trauma issues that he has been working on in therapy.      OCD: Denies s/s    ADHD: Denies s/s      Past Psychiatric History:   Previous Psychiatric Diagnoses: MDD, Impulse control disorder, Alcohol use disorder    Previous Psychiatric Medications:    1)Sertraline  2)Fluoxetine  3)Doxepin  4)Bupropion  5)Hydroxyzine  6)Lamotrigine: for mood stability; possibly somewhat effective     Previous ECT: No    Suicide Attempts/SIB:.Denies    Previous Psychiatric Hospitalizations:.3 times; attended 60 day program for alcohol/depression    Previous Psychiatric Medication Prescriber(s):.Dr. Daugherty    Therapist:.Dr. Bravo    : YI    Chemical Use History: Denies usage of and cravings for cannabis, heroin, methamphetamine, cocaine, prescription opioids/benzodiazepines.    Alcohol: as above        Medical Review of Symptoms: A review of the following systems was negative: Opthalmic, ENT, Cardiovascular, Gastrointestinal, Genitourinary, Musculoskeletal, Integumentary, Neurological, Endocrine, Respiratory, Hematologic, Immunologic     Current Medications:   No outpatient medications prior to visit.     No facility-administered medications prior to visit.      Primary care physician: Poli Mukherjee    Past Medical History:   Patient Active Problem List   Diagnosis     Ingrowing nail     Onychia and paronychia of toe     Major depressive disorder, recurrent episode, mild (H)     Generalized anxiety disorder     Posttraumatic stress disorder     Other Specified Disruptive, Impulsive-Control, and Conduct Disorder (hypersexuality)   No past medical history on file.@OBX@  Surgery: No past surgical history on file.Form of contraception:   Allergies: No known allergies    Family History:  Mr. Camejo's family history is not on file.  Schizophrenia: Unknown  Bipolar Disorder: Father and brother (unsure of medications)   Psychiatric Hospitalization: Father and brother  Attempted/Completed Suicide: Unknown  Depression: Many    Social History: Per Dr. Bravo    Mental Status Exam: The patient's orientation, memory,  Attention, language and fund of knowledge are at their usual best baseline.  Grooming/Hygiene: adequate  Eye Contact: normal  Psychomotor: normal  Observed mood and  affect: appropriate  Judgment: intact, with thoughtful decision making and insight  Speech: normal rate, rhythm, tone and volume  Thought processes: normal, with normal rate of thought  Associations:  No deficiency  Abnormal Thoughts: none    Ramon behaves in an engaging and cooperative manner.  His affect was depressed/flat.  His appearance was somewhat disheveled with noticeable odor.  No evidence of hypomania/jesus or psychosis.     Assessment: This is a 39 yo man with a longstanding hx of depression, anxiety, addiction/impulse control disorders. Ramon states that his biggest concern is his cross addiction which then helps trigger a spiral into worsening depression. Diagnostically, although he is not endorsing a pattern of sx which would suggest an episodic mood disorder, due to a strong family hx, we need to be vigilant of any change or non-response to tx.  Of note is that he recently received consistent psychiatric care through [facility] and there is no evidence of a bipolar dx.     At this time it is reasonable to approach medication management with his CSB and cross addictive behaviors in mind.  I would recommend naltrexone 50 mg.  He will start with 1/2 tablet daily for 2 weeks, then take 1 tablet daily. S/E, risks/benefits/alternatives were discussed.  I explained that there is evidence of efficacy of naltrexone for patients with CSB, alcohol use disorder and gambling disorder.  Because of his hx of alcohol abuse, and the fact that I do not have a recent hepatic panel available, I would recommend that he have liver function tested prior to starting the naltrexone.  An order was printed for him to take to his clinic.      The patient's questions were answered to his satisfaction and he voices understanding and agreement with the plan as documented below.          Plan:   1)I have printed a lab order slip for you to take to your doctor's office.  I would like you to have the liver function test done  before starting the naltrexone.    2)Naltrexone 50 mg: Take 1/2 tablet daily for 2 weeks, then take 1 tablet daily.         Bhargav Nolasco PA-C, EdD

## 2019-01-04 NOTE — PATIENT INSTRUCTIONS
1)I have printed a lab order slip for you to take to your doctor's office.  I would like you to have the liver function test done before starting the naltrexone.    2)Naltrexone 50 mg: Take 1/2 tablet daily for 2 weeks, then take 1 tablet daily.

## 2019-01-08 ENCOUNTER — TRANSFERRED RECORDS (OUTPATIENT)
Dept: HEALTH INFORMATION MANAGEMENT | Facility: CLINIC | Age: 39
End: 2019-01-08

## 2019-01-08 LAB
ALBUMIN SERPL-MCNC: 3.7 G/DL
ALP SERPL-CCNC: 78 U/L
ALT SERPL-CCNC: 28 U/L
AST SERPL-CCNC: 16 U/L
BILIRUB SERPL-MCNC: 0.4 MG/DL
BILIRUBIN DIRECT: 0.2 MG/DL
PROT SERPL-MCNC: 7.2 G/DL

## 2019-01-10 ENCOUNTER — OFFICE VISIT (OUTPATIENT)
Dept: OTHER | Facility: OUTPATIENT CENTER | Age: 39
End: 2019-01-10
Payer: COMMERCIAL

## 2019-01-10 ENCOUNTER — TELEPHONE (OUTPATIENT)
Dept: OTHER | Facility: OUTPATIENT CENTER | Age: 39
End: 2019-01-10

## 2019-01-10 DIAGNOSIS — F33.0 MAJOR DEPRESSIVE DISORDER, RECURRENT EPISODE, MILD (H): ICD-10-CM

## 2019-01-10 DIAGNOSIS — F43.10 POSTTRAUMATIC STRESS DISORDER: ICD-10-CM

## 2019-01-10 DIAGNOSIS — F41.1 GENERALIZED ANXIETY DISORDER: ICD-10-CM

## 2019-01-10 DIAGNOSIS — F91.8 OTHER CONDUCT DISORDERS: Primary | ICD-10-CM

## 2019-01-10 NOTE — TELEPHONE ENCOUNTER
Shawn calls today inquiring on their lab results.  Labs have been entered. Please review and call patient. Wanting to start new medication.    [place]

## 2019-01-10 NOTE — PROGRESS NOTES
[facility] -  Case Progress Note    Date of Service: 1/10/19  Client Name: Shawn Camejo  YOB: 1980  MRN:  [id number]  Treating Provider: Lux Bravo, PhD  Type of Session: Individual  Present in Session: Patient only  Number of Minutes:  53    Health Maintenance Summary - Mental Health Treatment Plan       Status Date      Mental Health Tx Plan Q11 MOS Next Due 8/28/2019      Done 9/28/2018         Current Symptoms/Status:  The client meets criteria for Other Specified Disruptive, Impulsive-Control, and Conduct Disorder (hypersexuality), which includes behavioral symptoms that cause clinically significant distress and impair social functioning. There appears to be evidence that client's behaviors are sexually compulsive in nature.     The client meets criteria for Major Depressive Disorder, Recurrent Episode, Mild, which includes experiencing anhedonia, worthlessness, hypersomnia, fatigue, and a depressed mood more days than not for a period of at least two weeks.    Client endorses the following anxiety symptoms: difficulty controlling worry; restlessness or feeling keyed up or on edge; being easily fatigued; difficulty concentrating or mind going blank; irritability; muscle tension; and sleep disturbance (difficulty falling or staying asleep, or restless unsatisfying sleep).    The client meets criteria for Posttraumatic Stress Disorder, which includes dissociative reactions, persistent avoidance of distressing memories, negative alternations in cognitions and mood associated with the event, and marked alterations in arousal and reactivity associated with the event. The symptoms have been present for at least one month and cause clinically significant distress or impairment.      Progress Toward Treatment Goals:   The client continues to show interest and commitment towards receiving services at [facility].  The client recently met with Bhargav Nolasco PA-C.  The client reported multiple  "boundary violations over the last week.      Intervention: Modality and Response:  Client fully oriented to person, place, and time. Therapist employed client-centered and CBT interventions to explore client's compulsive sexual behaviors and ongoing mental health concerns. Client shared numerous boundary violations such as visiting a massage parlor, receiving a \"handjob\" while at the massage parlor, and visiting a casino. Client acknowledged his numerous violations are \"ridiculous\" given his attempts to work the first step of his [employer] program. Client brought his journal to session and therapist had client write a list of boundary violations as well as a separate list of coping strategies client can engage in when feeling overwhelmed. Client reported he can keep these lists nearby for reference when he is feeling triggered. Therapist informed client he can join the Monday CSB group as soon as he asks work to change his schedule, and client reported intention to show up to work one hour early today so that he may visit HR. Client reported comfort telling HR he will need to ask for a schedule change for \"mental health support\". Client is awaiting the results of a liver test for clearance to begin taking Naltrexone.      Assignment:  -Remain connected with support from [facility] and AA.  -Ask HR for permission to attend weekly CSB groups.      Interactive Complexity:  N/A      Diagnosis:  312.89 Other Specified Disruptive, Impulsive-Control, and Conduct Disorder (hypersexuality) (F91.8)  296.31 Major Depressive Disorder, mild, recurrent (F33.0)  300.02 Generalized Anxiety Disorder (F41.1)  309.81 Posttraumatic Stress Disorder (F43.10)      Plan / Need for Future Services:  Return for therapy in one week.     Lux Bravo, PhD  Postdoctoral Fellow  "

## 2019-01-17 ENCOUNTER — OFFICE VISIT (OUTPATIENT)
Dept: OTHER | Facility: OUTPATIENT CENTER | Age: 39
End: 2019-01-17
Payer: COMMERCIAL

## 2019-01-17 DIAGNOSIS — F91.8 OTHER CONDUCT DISORDERS: Primary | ICD-10-CM

## 2019-01-17 DIAGNOSIS — F43.10 POSTTRAUMATIC STRESS DISORDER: ICD-10-CM

## 2019-01-17 DIAGNOSIS — F41.1 GENERALIZED ANXIETY DISORDER: ICD-10-CM

## 2019-01-17 DIAGNOSIS — F33.0 MAJOR DEPRESSIVE DISORDER, RECURRENT EPISODE, MILD (H): ICD-10-CM

## 2019-01-17 NOTE — PROGRESS NOTES
[facility] -  Case Progress Note    Date of Service: 1/17/19  Client Name: Shawn Camejo  YOB: 1980  MRN:  [id number]  Treating Provider: Lux Bravo, PhD  Type of Session: Individual  Present in Session: Patient only  Number of Minutes:  53    Health Maintenance Summary - Mental Health Treatment Plan       Status Date      Mental Health Tx Plan Q11 MOS Next Due 8/28/2019      Done 9/28/2018         Current Symptoms/Status:  The client meets criteria for Other Specified Disruptive, Impulsive-Control, and Conduct Disorder (hypersexuality), which includes behavioral symptoms that cause clinically significant distress and impair social functioning. There appears to be evidence that client's behaviors are sexually compulsive in nature.     The client meets criteria for Major Depressive Disorder, Recurrent Episode, Mild, which includes experiencing anhedonia, worthlessness, hypersomnia, fatigue, and a depressed mood more days than not for a period of at least two weeks.    Client endorses the following anxiety symptoms: difficulty controlling worry; restlessness or feeling keyed up or on edge; being easily fatigued; difficulty concentrating or mind going blank; irritability; muscle tension; and sleep disturbance (difficulty falling or staying asleep, or restless unsatisfying sleep).    The client meets criteria for Posttraumatic Stress Disorder, which includes dissociative reactions, persistent avoidance of distressing memories, negative alternations in cognitions and mood associated with the event, and marked alterations in arousal and reactivity associated with the event. The symptoms have been present for at least one month and cause clinically significant distress or impairment.      Progress Toward Treatment Goals:   The client continues to show interest and commitment towards receiving services at [facility].  The client reported decreased CSB over the last week.  The client asked HR  "about rearranging his schedule to attend group.      Intervention: Modality and Response:  Client fully oriented to person, place, and time. Therapist employed client-centered and CBT interventions to explore client's compulsive sexual behaviors and ongoing mental health concerns. Client shared one boundary violation of visiting a casino over the weekend; denied masturbating to Internet pornography. Therapist interventions were aimed at exploring greater success in avoiding CSB, and client shared engagement with [employer] group through discussing his first step with other group members. Client was noticeably more reflective in this session compared with previous sessions as evidenced by putting his history into perspective. Client reported asking HR to rearrange his schedule and was told to ask his supervisor since group is considered \"elective\" and not \"mandatory\" by HR. We discussed barriers to asking his supervisor to rearrange his schedule. Client discussed greater intimidation talking to his supervisor, although he reported a belief that his supervisor will not inquire about the nature of the group.      Assignment:  -Stay engaged with SOC[employer] members during meetings.  -Ask supervisor for permission to attend weekly CSB groups.      Interactive Complexity:  N/A      Diagnosis:  312.89 Other Specified Disruptive, Impulsive-Control, and Conduct Disorder (hypersexuality) (F91.8)  296.31 Major Depressive Disorder, mild, recurrent (F33.0)  300.02 Generalized Anxiety Disorder (F41.1)  309.81 Posttraumatic Stress Disorder (F43.10)      Plan / Need for Future Services:  Return for therapy in one week.     Lux Bravo, PhD  Postdoctoral Fellow  "

## 2019-01-24 ENCOUNTER — OFFICE VISIT (OUTPATIENT)
Dept: OTHER | Facility: OUTPATIENT CENTER | Age: 39
End: 2019-01-24
Payer: COMMERCIAL

## 2019-01-24 DIAGNOSIS — F41.1 GENERALIZED ANXIETY DISORDER: ICD-10-CM

## 2019-01-24 DIAGNOSIS — F91.8 OTHER CONDUCT DISORDERS: Primary | ICD-10-CM

## 2019-01-24 DIAGNOSIS — F43.10 POSTTRAUMATIC STRESS DISORDER: ICD-10-CM

## 2019-01-24 DIAGNOSIS — F33.0 MAJOR DEPRESSIVE DISORDER, RECURRENT EPISODE, MILD (H): ICD-10-CM

## 2019-01-24 NOTE — PROGRESS NOTES
[place] -  Case Progress Note    Date of Service: 1/24/19  Client Name: Shawn Camejo  YOB: 1980  MRN:  [id number]  Treating Provider: Lux Bravo, PhD  Type of Session: Individual  Present in Session: Patient only  Number of Minutes:  53    Health Maintenance Summary - Mental Health Treatment Plan       Status Date      Mental Health Tx Plan Q11 MOS Next Due 8/28/2019      Done 9/28/2018         Current Symptoms/Status:  The client meets criteria for Other Specified Disruptive, Impulsive-Control, and Conduct Disorder (hypersexuality), which includes behavioral symptoms that cause clinically significant distress and impair social functioning. There appears to be evidence that client's behaviors are sexually compulsive in nature.     The client meets criteria for Major Depressive Disorder, Recurrent Episode, Mild, which includes experiencing anhedonia, worthlessness, hypersomnia, fatigue, and a depressed mood more days than not for a period of at least two weeks.    Client endorses the following anxiety symptoms: difficulty controlling worry; restlessness or feeling keyed up or on edge; being easily fatigued; difficulty concentrating or mind going blank; irritability; muscle tension; and sleep disturbance (difficulty falling or staying asleep, or restless unsatisfying sleep).    The client meets criteria for Posttraumatic Stress Disorder, which includes dissociative reactions, persistent avoidance of distressing memories, negative alternations in cognitions and mood associated with the event, and marked alterations in arousal and reactivity associated with the event. The symptoms have been present for at least one month and cause clinically significant distress or impairment.      Progress Toward Treatment Goals:   The client continues to show interest and commitment towards receiving services at [facility].  The client reported decreased CSB over the last two weeks.  The client  recently began Naltrexone and has noticed decreased urges.      Intervention: Modality and Response:  Client fully oriented to person, place, and time. Therapist employed client-centered and CBT interventions to explore client's compulsive sexual behaviors and ongoing mental health concerns. Client reported no boundary violations over the last week and shared attending a birthday party of an [employer] member instead of going to the casino. Client discussed ongoing disconnection from others despite increase in socialization. Interventions included identifying strategies for connecting with others at group with the intention of building deeper relationships. Client was responsive to interventions and reported gaining insight. Client did not ask his supervisor to rearrange his schedule, and interventions were aimed at looking at the benefits of doing so. Client shared intention of asking for time off of work over the next week so that he may begin Monday CSB group.      Assignment:  -Stay engaged with [employer] members during meetings.  -Ask supervisor for permission to attend weekly CSB groups.      Interactive Complexity:  N/A      Diagnosis:  312.89 Other Specified Disruptive, Impulsive-Control, and Conduct Disorder (hypersexuality) (F91.8)  296.31 Major Depressive Disorder, mild, recurrent (F33.0)  300.02 Generalized Anxiety Disorder (F41.1)  309.81 Posttraumatic Stress Disorder (F43.10)      Plan / Need for Future Services:  Return for therapy in one week.     Lux Bravo, PhD  Postdoctoral Fellow

## 2019-01-31 ENCOUNTER — OFFICE VISIT (OUTPATIENT)
Dept: OTHER | Facility: OUTPATIENT CENTER | Age: 39
End: 2019-01-31
Payer: COMMERCIAL

## 2019-01-31 DIAGNOSIS — F43.10 POSTTRAUMATIC STRESS DISORDER: ICD-10-CM

## 2019-01-31 DIAGNOSIS — F91.8 OTHER CONDUCT DISORDERS: Primary | ICD-10-CM

## 2019-01-31 DIAGNOSIS — F41.1 GENERALIZED ANXIETY DISORDER: ICD-10-CM

## 2019-01-31 DIAGNOSIS — F33.0 MAJOR DEPRESSIVE DISORDER, RECURRENT EPISODE, MILD (H): ICD-10-CM

## 2019-01-31 NOTE — PROGRESS NOTES
[facility] -  Case Progress Note    Date of Service: 1/31/19  Client Name: Shawn Camejo  YOB: 1980  MRN:  [id number]  Treating Provider: Lux Bravo, PhD  Type of Session: Individual  Present in Session: Patient only  Number of Minutes:  53    Health Maintenance Summary - Mental Health Treatment Plan       Status Date      Mental Health Tx Plan Q11 MOS Next Due 8/28/2019      Done 9/28/2018         Current Symptoms/Status:  The client meets criteria for Other Specified Disruptive, Impulsive-Control, and Conduct Disorder (hypersexuality), which includes behavioral symptoms that cause clinically significant distress and impair social functioning. There appears to be evidence that client's behaviors are sexually compulsive in nature.     The client meets criteria for Major Depressive Disorder, Recurrent Episode, Mild, which includes experiencing anhedonia, worthlessness, hypersomnia, fatigue, and a depressed mood more days than not for a period of at least two weeks.    Client endorses the following anxiety symptoms: difficulty controlling worry; restlessness or feeling keyed up or on edge; being easily fatigued; difficulty concentrating or mind going blank; irritability; muscle tension; and sleep disturbance (difficulty falling or staying asleep, or restless unsatisfying sleep).    The client meets criteria for Posttraumatic Stress Disorder, which includes dissociative reactions, persistent avoidance of distressing memories, negative alternations in cognitions and mood associated with the event, and marked alterations in arousal and reactivity associated with the event. The symptoms have been present for at least one month and cause clinically significant distress or impairment.      Progress Toward Treatment Goals:   The client continues to show interest and commitment towards receiving services at [facility].  The client reported relapsing on Internet pornography once over the last  week.  The client recently began Naltrexone and has noticed decreased urges.      Intervention: Modality and Response:  Client fully oriented to person, place, and time. Therapist employed client-centered and CBT interventions to explore client's compulsive sexual behaviors and ongoing mental health concerns. Client reported relapsing once in the last week; viewed Internet pornography the night before this session. Discussed strategies that helped client maintain two weeks of sobriety from Internet pornography. Client still has not asked supervisor for permission to attend CSB group, and interventions were primarily aimed at exploring his resistance to having this conversation. Therapist validated discomfort around workplace gossip and helped client reframe responses to questions supervisor may potentially ask. Client changed the subject near the end of session to discuss difficulty moving on from his marriage that ended about 10 years ago, and he appeared to become tangential when speaking about this topic. Therapist helped client identify grounding techniques that client can employ when he notices his mind racing about topics outside of his control.      Assignment:  -Stay engaged with [employer] members during meetings.  -Ask supervisor for permission to attend weekly CSB groups.      Interactive Complexity:  N/A      Diagnosis:  312.89 Other Specified Disruptive, Impulsive-Control, and Conduct Disorder (hypersexuality) (F91.8)  296.31 Major Depressive Disorder, mild, recurrent (F33.0)  300.02 Generalized Anxiety Disorder (F41.1)  309.81 Posttraumatic Stress Disorder (F43.10)      Plan / Need for Future Services:  Return for therapy in one week.     Lux Bravo, PhD  Postdoctoral Fellow

## 2019-02-07 ENCOUNTER — OFFICE VISIT (OUTPATIENT)
Dept: OTHER | Facility: OUTPATIENT CENTER | Age: 39
End: 2019-02-07
Payer: COMMERCIAL

## 2019-02-07 DIAGNOSIS — F33.0 MAJOR DEPRESSIVE DISORDER, RECURRENT EPISODE, MILD (H): ICD-10-CM

## 2019-02-07 DIAGNOSIS — F91.8 OTHER CONDUCT DISORDERS: Primary | ICD-10-CM

## 2019-02-07 DIAGNOSIS — F41.1 GENERALIZED ANXIETY DISORDER: ICD-10-CM

## 2019-02-07 DIAGNOSIS — F43.10 POSTTRAUMATIC STRESS DISORDER: ICD-10-CM

## 2019-02-07 NOTE — PROGRESS NOTES
[place] -  Case Progress Note    Date of Service: 2/07/19  Client Name: Shawn Camejo  YOB: 1980  MRN:  [id number]  Treating Provider: Lux Bravo, PhD  Type of Session: Individual  Present in Session: Patient only  Number of Minutes:  53    Health Maintenance Summary - Mental Health Treatment Plan       Status Date      Mental Health Tx Plan Q11 MOS Next Due 8/28/2019      Done 9/28/2018         Current Symptoms/Status:  The client meets criteria for Other Specified Disruptive, Impulsive-Control, and Conduct Disorder (hypersexuality), which includes behavioral symptoms that cause clinically significant distress and impair social functioning. There appears to be evidence that client's behaviors are sexually compulsive in nature.     The client meets criteria for Major Depressive Disorder, Recurrent Episode, Mild, which includes experiencing anhedonia, worthlessness, hypersomnia, fatigue, and a depressed mood more days than not for a period of at least two weeks.    Client endorses the following anxiety symptoms: difficulty controlling worry; restlessness or feeling keyed up or on edge; being easily fatigued; difficulty concentrating or mind going blank; irritability; muscle tension; and sleep disturbance (difficulty falling or staying asleep, or restless unsatisfying sleep).    The client meets criteria for Posttraumatic Stress Disorder, which includes dissociative reactions, persistent avoidance of distressing memories, negative alternations in cognitions and mood associated with the event, and marked alterations in arousal and reactivity associated with the event. The symptoms have been present for at least one month and cause clinically significant distress or impairment.      Progress Toward Treatment Goals:   The client continues to show interest and commitment towards receiving services at [facility].  The client reported relapsing on Internet pornography once over the last  "week.  The client recently began Naltrexone and has noticed decreased urges.      Intervention: Modality and Response:  Client fully oriented to person, place, and time. Therapist employed client-centered and CBT interventions to explore client's compulsive sexual behaviors and ongoing mental health concerns. Client reported relapsing once in the last week; viewed Internet pornography over the last weekend. Discussed strategies for maintaining sobriety moving forward, and therapist introduced the concept of SMART goals with client. Client was receptive to developing SMART goals, and he identified not viewing Internet pornography over the next week as his primary goal. He also identified goals of reaching out to his sponsor on a daily basis and staying connected with other support from 12-step groups. Client asked supervisor to rearrange his work schedule one day of the week in order to attend CSB group, and was told that he would need to rearrange his entire weekly schedule and that this arrangement would only be valid for 90 work days (4.5 months) before needing re-approval from supervisor. In assessing client's feelings about this information, he reported feeling \"frustrated\" but is still committed to attending group. Therapist will follow-up with CSB group facilitators about possibility of joining the Monday or Thursday group for at least 4.5 months.       Assignment:  -Stay engaged with [employer] members during meetings.  -Avoid Internet pornography.      Interactive Complexity:  N/A      Diagnosis:  312.89 Other Specified Disruptive, Impulsive-Control, and Conduct Disorder (hypersexuality) (F91.8)  296.31 Major Depressive Disorder, mild, recurrent (F33.0)  300.02 Generalized Anxiety Disorder (F41.1)  309.81 Posttraumatic Stress Disorder (F43.10)      Plan / Need for Future Services:  Return for therapy in one week.     Lux Bravo, PhD  Postdoctoral Fellow  "

## 2019-02-14 ENCOUNTER — OFFICE VISIT (OUTPATIENT)
Dept: OTHER | Facility: OUTPATIENT CENTER | Age: 39
End: 2019-02-14
Payer: COMMERCIAL

## 2019-02-14 DIAGNOSIS — F91.8 OTHER CONDUCT DISORDERS: Primary | ICD-10-CM

## 2019-02-14 DIAGNOSIS — F33.0 MAJOR DEPRESSIVE DISORDER, RECURRENT EPISODE, MILD (H): ICD-10-CM

## 2019-02-14 DIAGNOSIS — F43.10 POSTTRAUMATIC STRESS DISORDER: ICD-10-CM

## 2019-02-14 DIAGNOSIS — F41.1 GENERALIZED ANXIETY DISORDER: ICD-10-CM

## 2019-02-14 NOTE — PROGRESS NOTES
[place] -  Case Progress Note    Date of Service: 2/14/19  Client Name: Shawn Camejo  YOB: 1980  MRN:  [id number]  Treating Provider: Lux Bravo, PhD  Type of Session: Individual  Present in Session: Patient only  Number of Minutes:  53    Health Maintenance Summary - Mental Health Treatment Plan       Status Date      Mental Health Tx Plan Q11 MOS Next Due 8/28/2019      Done 9/28/2018         Current Symptoms/Status:  The client meets criteria for Other Specified Disruptive, Impulsive-Control, and Conduct Disorder (hypersexuality), which includes behavioral symptoms that cause clinically significant distress and impair social functioning. There appears to be evidence that client's behaviors are sexually compulsive in nature.     The client meets criteria for Major Depressive Disorder, Recurrent Episode, Mild, which includes experiencing anhedonia, worthlessness, hypersomnia, fatigue, and a depressed mood more days than not for a period of at least two weeks.    Client endorses the following anxiety symptoms: difficulty controlling worry; restlessness or feeling keyed up or on edge; being easily fatigued; difficulty concentrating or mind going blank; irritability; muscle tension; and sleep disturbance (difficulty falling or staying asleep, or restless unsatisfying sleep).    The client meets criteria for Posttraumatic Stress Disorder, which includes dissociative reactions, persistent avoidance of distressing memories, negative alternations in cognitions and mood associated with the event, and marked alterations in arousal and reactivity associated with the event. The symptoms have been present for at least one month and cause clinically significant distress or impairment.      Progress Toward Treatment Goals:   The client continues to show interest and commitment towards receiving services at [facility].  The client reported relapsing on Internet pornography once over the last  week.  The client plans to meet with Dr. Will to discuss joining a CSB group.      Intervention: Modality and Response:  Client fully oriented to person, place, and time. Therapist employed client-centered and CBT interventions to explore client's compulsive sexual behaviors and ongoing mental health concerns. Client reported relapsing once in the last week; viewed Internet pornography. Discussed strategies moving forward, and client shared increased contact with 12-step groups. He plans to attend a 3-day weekend retreat with one of his [employer] groups and feels intimidated. Interventions included examining attachment and connection patterns in early relationships, and client shared previous desire to be in a relationship without getting to authentically know others. Client plans to meet with Dr. Will to discuss joining the Thursday CSB group.      Assignment:  -Stay engaged with [employer] members during meetings.  -Avoid Internet pornography.      Interactive Complexity:  N/A      Diagnosis:  312.89 Other Specified Disruptive, Impulsive-Control, and Conduct Disorder (hypersexuality) (F91.8)  296.31 Major Depressive Disorder, mild, recurrent (F33.0)  300.02 Generalized Anxiety Disorder (F41.1)  309.81 Posttraumatic Stress Disorder (F43.10)      Plan / Need for Future Services:  Return for therapy in one week.     Lux Bravo, PhD  Postdoctoral Fellow

## 2019-02-21 ENCOUNTER — OFFICE VISIT (OUTPATIENT)
Dept: OTHER | Facility: OUTPATIENT CENTER | Age: 39
End: 2019-02-21
Payer: COMMERCIAL

## 2019-02-21 DIAGNOSIS — F43.10 POSTTRAUMATIC STRESS DISORDER: ICD-10-CM

## 2019-02-21 DIAGNOSIS — F41.1 GENERALIZED ANXIETY DISORDER: ICD-10-CM

## 2019-02-21 DIAGNOSIS — F91.8 OTHER CONDUCT DISORDERS: Primary | ICD-10-CM

## 2019-02-21 DIAGNOSIS — F33.0 MAJOR DEPRESSIVE DISORDER, RECURRENT EPISODE, MILD (H): ICD-10-CM

## 2019-02-21 NOTE — PROGRESS NOTES
[place] [facility] -  Case Progress Note    Date of Service: 2/21/19  Client Name: Shawn Camejo  YOB: 1980  MRN:  [id number]  Treating Provider: Lux Bravo, PhD  Type of Session: Individual  Present in Session: Patient only  Number of Minutes:  53    Health Maintenance Summary - Mental Health Treatment Plan       Status Date      Mental Health Tx Plan Q11 MOS Next Due 8/28/2019      Done 9/28/2018         Current Symptoms/Status:  The client meets criteria for Other Specified Disruptive, Impulsive-Control, and Conduct Disorder (hypersexuality), which includes behavioral symptoms that cause clinically significant distress and impair social functioning. There appears to be evidence that client's behaviors are sexually compulsive in nature.     The client meets criteria for Major Depressive Disorder, Recurrent Episode, Mild, which includes experiencing anhedonia, worthlessness, hypersomnia, fatigue, and a depressed mood more days than not for a period of at least two weeks.    Client endorses the following anxiety symptoms: difficulty controlling worry; restlessness or feeling keyed up or on edge; being easily fatigued; difficulty concentrating or mind going blank; irritability; muscle tension; and sleep disturbance (difficulty falling or staying asleep, or restless unsatisfying sleep).    The client meets criteria for Posttraumatic Stress Disorder, which includes dissociative reactions, persistent avoidance of distressing memories, negative alternations in cognitions and mood associated with the event, and marked alterations in arousal and reactivity associated with the event. The symptoms have been present for at least one month and cause clinically significant distress or impairment.      Progress Toward Treatment Goals:   The client continues to show interest and commitment towards receiving services at [facility].  The client reported desire to be more proactive in his recovery.  The client  plans to meet with Dr. Will to discuss joining a CSB group.      Intervention: Modality and Response:  Client fully oriented to person, place, and time. Therapist employed client-centered and CBT interventions to explore client's compulsive sexual behaviors and ongoing mental health concerns. Client relapsed numerous times on gambling, alcohol, and sex addictions over the recent weekend. Interventions included identifying thought patterns that precipitate acting out, and client is essentially on autopilot when he engages with his addictive thoughts and fantasies. Client expressed a desire to not have fantasies, and provider reframed this desire as an opportunity to intentionally acknowledge and cope with distressing fantasies by reaching out to others. Client acknowledged he needs to attend more 12-step groups (has only been attending one per week) and he identified three specific individuals in addition to his sponsor he can reach out to. Client plans to meet with Dr. Will next week to discuss joining Thursday CSB group.      Assignment:  -Attend at least 3 12-step meetings per week.  -Reach out to individuals from 12-step groups when experiencing distressing thoughts.      Interactive Complexity:  N/A      Diagnosis:  312.89 Other Specified Disruptive, Impulsive-Control, and Conduct Disorder (hypersexuality) (F91.8)  296.31 Major Depressive Disorder, mild, recurrent (F33.0)  300.02 Generalized Anxiety Disorder (F41.1)  309.81 Posttraumatic Stress Disorder (F43.10)      Plan / Need for Future Services:  Return for therapy in one week.     Lux Bravo, PhD  Postdoctoral Fellow

## 2019-02-28 ENCOUNTER — OFFICE VISIT (OUTPATIENT)
Dept: OTHER | Facility: OUTPATIENT CENTER | Age: 39
End: 2019-02-28
Payer: COMMERCIAL

## 2019-02-28 DIAGNOSIS — F91.8 OTHER CONDUCT DISORDERS: Primary | ICD-10-CM

## 2019-02-28 DIAGNOSIS — F43.10 POSTTRAUMATIC STRESS DISORDER: ICD-10-CM

## 2019-02-28 DIAGNOSIS — F41.1 GENERALIZED ANXIETY DISORDER: ICD-10-CM

## 2019-02-28 DIAGNOSIS — F33.0 MAJOR DEPRESSIVE DISORDER, RECURRENT EPISODE, MILD (H): ICD-10-CM

## 2019-02-28 DIAGNOSIS — F41.1 GENERALIZED ANXIETY DISORDER: Primary | ICD-10-CM

## 2019-02-28 DIAGNOSIS — F91.8 OTHER CONDUCT DISORDERS: ICD-10-CM

## 2019-02-28 NOTE — PROGRESS NOTES
[place] -  Case Progress Note    Date of Service: 2/28/19  Client Name: Shawn Camejo  YOB: 1980  MRN:  [id number]  Treating Provider: Lux Bravo, PhD  Type of Session: Individual  Present in Session: Patient only  Number of Minutes:  53    Health Maintenance Summary - Mental Health Treatment Plan       Status Date      Mental Health Tx Plan Q11 MOS Next Due 8/28/2019      Done 9/28/2018         Current Symptoms/Status:  The client meets criteria for Other Specified Disruptive, Impulsive-Control, and Conduct Disorder (hypersexuality), which includes behavioral symptoms that cause clinically significant distress and impair social functioning. There appears to be evidence that client's behaviors are sexually compulsive in nature.     The client meets criteria for Major Depressive Disorder, Recurrent Episode, Mild, which includes experiencing anhedonia, worthlessness, hypersomnia, fatigue, and a depressed mood more days than not for a period of at least two weeks.    Client endorses the following anxiety symptoms: difficulty controlling worry; restlessness or feeling keyed up or on edge; being easily fatigued; difficulty concentrating or mind going blank; irritability; muscle tension; and sleep disturbance (difficulty falling or staying asleep, or restless unsatisfying sleep).    The client meets criteria for Posttraumatic Stress Disorder, which includes dissociative reactions, persistent avoidance of distressing memories, negative alternations in cognitions and mood associated with the event, and marked alterations in arousal and reactivity associated with the event. The symptoms have been present for at least one month and cause clinically significant distress or impairment.      Progress Toward Treatment Goals:   The client continues to show interest and commitment towards receiving services at [facility].  The client reported desire to be more proactive in his recovery.  The client  plans to meet with Dr. Will later today to discuss joining a CSB group.      Intervention: Modality and Response:  Client fully oriented to person, place, and time. Therapist employed client-centered and CBT interventions to explore client's compulsive sexual behaviors and ongoing mental health concerns. Client relapsed 2-3 times over the last week by viewing Internet pornography. Discussed difficulty employing interventions discussed in session in his daily life, and he reported distractability and depression as barriers. Client reported stopping Naltrexone two days ago due to nausea; he will follow up with Bhargav Nolasco about the side effects. Interventions included identifying values he would like to guide his recovery, and cl identified loyalty, honesty, humility, and caring. He identified behaviors he can engage in that are aligned with these values, such as reaching out to his sponsor daily as well as other individuals from 12-step groups. Therapist assigned client a homework assignment of journaling in order to obtain more concrete information client's thought processes during the week.      Assignment:  -Attend at least 3 12-step meetings per week.  -Begin journaling 1-2 times per week.      Interactive Complexity:  N/A      Diagnosis:  312.89 Other Specified Disruptive, Impulsive-Control, and Conduct Disorder (hypersexuality) (F91.8)  296.31 Major Depressive Disorder, mild, recurrent (F33.0)  300.02 Generalized Anxiety Disorder (F41.1)  309.81 Posttraumatic Stress Disorder (F43.10)      Plan / Need for Future Services:  Return for therapy in one week.     Lux Bravo, PhD  Postdoctoral Fellow

## 2019-02-28 NOTE — PROGRESS NOTES
[facility] -  Case Progress Note    Date of Service: 2/28/19  Client Name: Shawn Camejo  YOB: 1980  MRN:  [id number]  Treating Provider: Sydney Will, PhD  Type of Session: Individual  Present in Session: Patient only  Number of Minutes:  53    Health Maintenance Summary - Mental Health Treatment Plan       Status Date      Mental Health Tx Plan Q11 MOS Next Due 8/28/2019      Done 9/28/2018         Current Symptoms/Status:  The client meets criteria for Other Specified Disruptive, Impulsive-Control, and Conduct Disorder (hypersexuality), which includes behavioral symptoms that cause clinically significant distress and impair social functioning. There appears to be evidence that client's behaviors are sexually compulsive in nature.     The client meets criteria for Major Depressive Disorder, Recurrent Episode, Mild, which includes experiencing anhedonia, worthlessness, hypersomnia, fatigue, and a depressed mood more days than not for a period of at least two weeks.    Client endorses the following anxiety symptoms: difficulty controlling worry; restlessness or feeling keyed up or on edge; being easily fatigued; difficulty concentrating or mind going blank; irritability; muscle tension; and sleep disturbance (difficulty falling or staying asleep, or restless unsatisfying sleep).    The client meets criteria for Posttraumatic Stress Disorder, which includes dissociative reactions, persistent avoidance of distressing memories, negative alternations in cognitions and mood associated with the event, and marked alterations in arousal and reactivity associated with the event. The symptoms have been present for at least one month and cause clinically significant distress or impairment.      Progress Toward Treatment Goals:   Client met with me to discuss group therapy fit.       Intervention: Modality and Response:  Client fully oriented to person, place, and time.  Therapist employed  "client-centered and CBT interventions to explore client's compulsive sexual behaviors and ongoing mental health concerns. Client discussed that he engages in a variety of \"addictive behaviors\" alcohol gambling and sex. Discussed feeling of being disconnected with time he spends engaging in CSB. Discussed group process and oriented client to purpose of group. Discussed possibility of changing therapists. Client expressed feeling overwhelmed about therapy in general. Concerns were addressed and reminded client that he can take as much or as little time as he needs to decide about group and therapy. Recommended that he look over the CSB folder, talk to his individual therapist about where, how, and when client will start group. When client decides to do group, can meet with him again to go over rules.         Assignment:  Talk to therapist    Interactive Complexity:  N/A      Diagnosis:  312.89 Other Specified Disruptive, Impulsive-Control, and Conduct Disorder (hypersexuality) (F91.8)  296.31 Major Depressive Disorder, mild, recurrent (F33.0)  300.02 Generalized Anxiety Disorder (F41.1)  309.81 Posttraumatic Stress Disorder (F43.10)      Plan / Need for Future Services:  Return for therapy, as needed.        Sydney Will, PhD  Postdoctoral Fellow    "

## 2019-03-04 NOTE — PROGRESS NOTES
I did not personally see the patient.  I reviewed and agree with the assessment and plan as documented in this note. Lucy Braswell, PhD, LP

## 2019-03-07 ENCOUNTER — OFFICE VISIT (OUTPATIENT)
Dept: OTHER | Facility: OUTPATIENT CENTER | Age: 39
End: 2019-03-07
Payer: COMMERCIAL

## 2019-03-07 DIAGNOSIS — F43.10 POSTTRAUMATIC STRESS DISORDER: ICD-10-CM

## 2019-03-07 DIAGNOSIS — F33.0 MAJOR DEPRESSIVE DISORDER, RECURRENT EPISODE, MILD (H): ICD-10-CM

## 2019-03-07 DIAGNOSIS — F41.1 GENERALIZED ANXIETY DISORDER: ICD-10-CM

## 2019-03-07 DIAGNOSIS — F91.8 OTHER CONDUCT DISORDERS: Primary | ICD-10-CM

## 2019-03-07 NOTE — PROGRESS NOTES
[facility] [place] [facility] -  Case Progress Note    Date of Service: 3/07/19  Client Name: Shawn Camejo  YOB: 1980  MRN:  [id number]  Treating Provider: Lux Bravo, PhD  Type of Session: Individual  Present in Session: Patient only  Number of Minutes:  53    Health Maintenance Summary - Mental Health Treatment Plan       Status Date      Mental Health Tx Plan Q11 MOS Next Due 8/28/2019      Done 9/28/2018         Current Symptoms/Status:  The client meets criteria for Other Specified Disruptive, Impulsive-Control, and Conduct Disorder (hypersexuality), which includes behavioral symptoms that cause clinically significant distress and impair social functioning. There appears to be evidence that client's behaviors are sexually compulsive in nature.     The client meets criteria for Major Depressive Disorder, Recurrent Episode, Mild, which includes experiencing anhedonia, worthlessness, hypersomnia, fatigue, and a depressed mood more days than not for a period of at least two weeks.    Client endorses the following anxiety symptoms: difficulty controlling worry; restlessness or feeling keyed up or on edge; being easily fatigued; difficulty concentrating or mind going blank; irritability; muscle tension; and sleep disturbance (difficulty falling or staying asleep, or restless unsatisfying sleep).    The client meets criteria for Posttraumatic Stress Disorder, which includes dissociative reactions, persistent avoidance of distressing memories, negative alternations in cognitions and mood associated with the event, and marked alterations in arousal and reactivity associated with the event. The symptoms have been present for at least one month and cause clinically significant distress or impairment.      Progress Toward Treatment Goals:   The client continues to show interest and commitment towards receiving services at [facility].  The client reported desire to be more proactive in his recovery.  The client  reported decreased CSB over the last week.      Intervention: Modality and Response:  Client fully oriented to person, place, and time. Therapist employed client-centered and CBT interventions to explore client's compulsive sexual behaviors and ongoing mental health concerns. Client reported no relapses over the last week and has been more proactive connecting with others at 12-step meetings. He was noticeably more engaged with session than previously, and demonstrated additional growth by asserting his need to maintain current work schedule in order to avoid disputing progress he has made thus far. Therapist and client helped client identify an additional boundary of ruminating on previous relationships in order to work toward a place of acceptance, and client responded appropriately and thoughtfully to interventions.      Assignment:  -Attend at least 3 12-step meetings per week.  -Begin journaling 1-2 times per week.      Interactive Complexity:  N/A      Diagnosis:  312.89 Other Specified Disruptive, Impulsive-Control, and Conduct Disorder (hypersexuality) (F91.8)  296.31 Major Depressive Disorder, mild, recurrent (F33.0)  300.02 Generalized Anxiety Disorder (F41.1)  309.81 Posttraumatic Stress Disorder (F43.10)      Plan / Need for Future Services:  Return for therapy in one week.     Lux Bravo, PhD  Postdoctoral Fellow

## 2019-03-21 ENCOUNTER — OFFICE VISIT (OUTPATIENT)
Dept: OTHER | Facility: OUTPATIENT CENTER | Age: 39
End: 2019-03-21
Payer: COMMERCIAL

## 2019-03-21 DIAGNOSIS — F33.0 MAJOR DEPRESSIVE DISORDER, RECURRENT EPISODE, MILD (H): ICD-10-CM

## 2019-03-21 DIAGNOSIS — F91.8 OTHER CONDUCT DISORDERS: Primary | ICD-10-CM

## 2019-03-21 DIAGNOSIS — F43.10 POSTTRAUMATIC STRESS DISORDER: ICD-10-CM

## 2019-03-21 DIAGNOSIS — F41.1 GENERALIZED ANXIETY DISORDER: ICD-10-CM

## 2019-03-21 NOTE — PROGRESS NOTES
[place] [facility] -  Case Progress Note    Date of Service: 3/21/19  Client Name: Shawn Camejo  YOB: 1980  MRN:  [id number]  Treating Provider: Lux Bravo, PhD  Type of Session: Individual  Present in Session: Patient only  Number of Minutes:  53    Health Maintenance Summary - Mental Health Treatment Plan       Status Date      Mental Health Tx Plan Q11 MOS Next Due 8/28/2019      Done 9/28/2018         Current Symptoms/Status:  The client meets criteria for Other Specified Disruptive, Impulsive-Control, and Conduct Disorder (hypersexuality), which includes behavioral symptoms that cause clinically significant distress and impair social functioning. There appears to be evidence that client's behaviors are sexually compulsive in nature.     The client meets criteria for Major Depressive Disorder, Recurrent Episode, Mild, which includes experiencing anhedonia, worthlessness, hypersomnia, fatigue, and a depressed mood more days than not for a period of at least two weeks.    Client endorses the following anxiety symptoms: difficulty controlling worry; restlessness or feeling keyed up or on edge; being easily fatigued; difficulty concentrating or mind going blank; irritability; muscle tension; and sleep disturbance (difficulty falling or staying asleep, or restless unsatisfying sleep).    The client meets criteria for Posttraumatic Stress Disorder, which includes dissociative reactions, persistent avoidance of distressing memories, negative alternations in cognitions and mood associated with the event, and marked alterations in arousal and reactivity associated with the event. The symptoms have been present for at least one month and cause clinically significant distress or impairment.      Progress Toward Treatment Goals:   The client continues to show interest and commitment towards receiving services at [facility].  The client reported desire to be more proactive in his recovery.  The client  "reported decreased CSB over the two weeks.      Intervention: Modality and Response:  Client fully oriented to person, place, and time. Therapist employed client-centered and CBT interventions to explore client's compulsive sexual behaviors and ongoing mental health concerns. Client reported masturbating 3-4 times per week over the last two weeks; was unable to attend session last week due to car troubles. Client mood was notably brighter this session, and he presented to session clean shaven. He spoke about connecting with other men in recovery at a recent retreat, and finally acquired a new work schedule such that he will have Wednesdays and Thursdays off. He laughed while speaking about previous relapses going to massage parlors and visiting prostitutes, describing these behaviors as \"ridiculous\". Ramon will join the Thursday CSB group beginning April 4. We discussed transferring individual therapists to one of his group's cofacilitators, and client was open and receptive to the idea.      Assignment:  -Attend at least 3 12-step meetings per week.  -Begin journaling 1-2 times per week.      Interactive Complexity:  N/A      Diagnosis:  312.89 Other Specified Disruptive, Impulsive-Control, and Conduct Disorder (hypersexuality) (F91.8)  296.31 Major Depressive Disorder, mild, recurrent (F33.0)  300.02 Generalized Anxiety Disorder (F41.1)  309.81 Posttraumatic Stress Disorder (F43.10)      Plan / Need for Future Services:  Return for therapy in one week.     Lux Bravo, PhD  Postdoctoral Fellow  "

## 2019-03-28 ENCOUNTER — OFFICE VISIT (OUTPATIENT)
Dept: OTHER | Facility: OUTPATIENT CENTER | Age: 39
End: 2019-03-28
Payer: COMMERCIAL

## 2019-03-28 DIAGNOSIS — F43.10 POSTTRAUMATIC STRESS DISORDER: ICD-10-CM

## 2019-03-28 DIAGNOSIS — F41.1 GENERALIZED ANXIETY DISORDER: ICD-10-CM

## 2019-03-28 DIAGNOSIS — F33.0 MAJOR DEPRESSIVE DISORDER, RECURRENT EPISODE, MILD (H): ICD-10-CM

## 2019-03-28 DIAGNOSIS — F91.8 OTHER CONDUCT DISORDERS: Primary | ICD-10-CM

## 2019-03-28 NOTE — PROGRESS NOTES
[place] [facility] -  Case Progress Note    Date of Service: 3/28/19  Client Name: Shawn Camejo  YOB: 1980  MRN:  [id number]  Treating Provider: Lux Bravo, PhD  Type of Session: Individual  Present in Session: Patient only  Number of Minutes:  53    Health Maintenance Summary - Mental Health Treatment Plan       Status Date      Mental Health Tx Plan Q11 MOS Next Due 8/28/2019      Done 9/28/2018         Current Symptoms/Status:  The client meets criteria for Other Specified Disruptive, Impulsive-Control, and Conduct Disorder (hypersexuality), which includes behavioral symptoms that cause clinically significant distress and impair social functioning. There appears to be evidence that client's behaviors are sexually compulsive in nature.     The client meets criteria for Major Depressive Disorder, Recurrent Episode, Mild, which includes experiencing anhedonia, worthlessness, hypersomnia, fatigue, and a depressed mood more days than not for a period of at least two weeks.    Client endorses the following anxiety symptoms: difficulty controlling worry; restlessness or feeling keyed up or on edge; being easily fatigued; difficulty concentrating or mind going blank; irritability; muscle tension; and sleep disturbance (difficulty falling or staying asleep, or restless unsatisfying sleep).    The client meets criteria for Posttraumatic Stress Disorder, which includes dissociative reactions, persistent avoidance of distressing memories, negative alternations in cognitions and mood associated with the event, and marked alterations in arousal and reactivity associated with the event. The symptoms have been present for at least one month and cause clinically significant distress or impairment.      Progress Toward Treatment Goals:   The client continues to show interest and commitment towards receiving services at [facility].  The client will join CSB group next week.  The client discussed recent  relapse.      Intervention: Modality and Response:  Client fully oriented to person, place, and time. Therapist employed client-centered and CBT interventions to explore client's compulsive sexual behaviors and ongoing mental health concerns. Client reported relapsing on sex addiction, alcohol, and gambling last week; reported having sex with a prostitute, visiting a strip club, and going to a casino. He discussed dishonesty by not sharing this relapse with his sponsor; interventions were targeted at cl resistance to disclosing relapse to sponsor, and he reported increased commitment to be honest with his sponsor by the end of the session. Cl will join CSB group starting next week and looks forward to having others to help hold him accountable to relapses in his recovery. We discussed potential barriers or triggers in group, and he identified having a female co-facilitator (Dr. Will) as a potential barrier for remaining focused in group. Cl was invited to openly share these concerns with Dr. Espino should he transfer to her for individual therapy.      Assignment:  -Attend at least 3 12-step meetings per week.  -Begin CSB group next week.      Interactive Complexity:  N/A      Diagnosis:  312.89 Other Specified Disruptive, Impulsive-Control, and Conduct Disorder (hypersexuality) (F91.8)  296.31 Major Depressive Disorder, mild, recurrent (F33.0)  300.02 Generalized Anxiety Disorder (F41.1)  309.81 Posttraumatic Stress Disorder (F43.10)      Plan / Need for Future Services:  Return for therapy in one week.     Lux Bravo, PhD  Postdoctoral Fellow

## 2019-04-04 ENCOUNTER — OFFICE VISIT (OUTPATIENT)
Dept: OTHER | Facility: OUTPATIENT CENTER | Age: 39
End: 2019-04-04
Payer: COMMERCIAL

## 2019-04-04 VITALS
BODY MASS INDEX: 31.96 KG/M2 | HEIGHT: 69 IN | HEART RATE: 77 BPM | SYSTOLIC BLOOD PRESSURE: 125 MMHG | WEIGHT: 215.8 LBS | DIASTOLIC BLOOD PRESSURE: 82 MMHG

## 2019-04-04 DIAGNOSIS — F41.1 GENERALIZED ANXIETY DISORDER: ICD-10-CM

## 2019-04-04 DIAGNOSIS — F43.10 POSTTRAUMATIC STRESS DISORDER: ICD-10-CM

## 2019-04-04 DIAGNOSIS — F91.8 OTHER CONDUCT DISORDERS: Primary | ICD-10-CM

## 2019-04-04 DIAGNOSIS — F33.0 MAJOR DEPRESSIVE DISORDER, RECURRENT EPISODE, MILD (H): ICD-10-CM

## 2019-04-04 RX ORDER — SERTRALINE HYDROCHLORIDE 100 MG/1
100 TABLET, FILM COATED ORAL DAILY
Qty: 90 TABLET | Refills: 1 | COMMUNITY
Start: 2019-04-04 | End: 2019-08-22

## 2019-04-04 RX ORDER — SERTRALINE HYDROCHLORIDE 100 MG/1
100 TABLET, FILM COATED ORAL
COMMUNITY
End: 2019-04-04

## 2019-04-04 ASSESSMENT — MIFFLIN-ST. JEOR: SCORE: 1879.24

## 2019-04-04 NOTE — PROGRESS NOTES
[place] [facility] -  Case Progress Note    Date of Service: 4/04/19  Client Name: Shawn Camejo  YOB: 1980  MRN:  [id number]  Treating Provider: Lux Bravo, PhD  Type of Session: Individual  Present in Session: Patient only  Number of Minutes:  53    Health Maintenance Summary - Mental Health Treatment Plan       Status Date      Mental Health Tx Plan Q11 MOS Next Due 8/28/2019      Done 9/28/2018         Current Symptoms/Status:  The client meets criteria for Other Specified Disruptive, Impulsive-Control, and Conduct Disorder (hypersexuality), which includes behavioral symptoms that cause clinically significant distress and impair social functioning. There appears to be evidence that client's behaviors are sexually compulsive in nature.     The client meets criteria for Major Depressive Disorder, Recurrent Episode, Mild, which includes experiencing anhedonia, worthlessness, hypersomnia, fatigue, and a depressed mood more days than not for a period of at least two weeks.    Client endorses the following anxiety symptoms: difficulty controlling worry; restlessness or feeling keyed up or on edge; being easily fatigued; difficulty concentrating or mind going blank; irritability; muscle tension; and sleep disturbance (difficulty falling or staying asleep, or restless unsatisfying sleep).    The client meets criteria for Posttraumatic Stress Disorder, which includes dissociative reactions, persistent avoidance of distressing memories, negative alternations in cognitions and mood associated with the event, and marked alterations in arousal and reactivity associated with the event. The symptoms have been present for at least one month and cause clinically significant distress or impairment.      Progress Toward Treatment Goals:   The client continues to show interest and commitment towards receiving services at [facility].  The client will join CSB group today.      Intervention: Modality and  "Response:  Client fully oriented to person, place, and time. Therapist employed client-centered and CBT interventions to explore client's compulsive sexual behaviors and ongoing mental health concerns. Client presented tired to session, and reported ongoing adjustment to new work schedule. He was engaged with therapist's interventions and participated in a discussion of antecedent events, thoughts, and feelings leading to behaviors that move him toward or away from recovery. Client will begin CSB group tonight, and reported anxiety of \"failing\" his recovery should he relapse after joining group. Therapist normalized this concern and encouraged him to share this information with group members.      Assignment:  -Stay connected with sponsor.  -Begin CSB group today.      Interactive Complexity:  N/A      Diagnosis:  312.89 Other Specified Disruptive, Impulsive-Control, and Conduct Disorder (hypersexuality) (F91.8)  296.31 Major Depressive Disorder, mild, recurrent (F33.0)  300.02 Generalized Anxiety Disorder (F41.1)  309.81 Posttraumatic Stress Disorder (F43.10)      Plan / Need for Future Services:  Return for therapy in one week.     Lux Bravo, PhD  Postdoctoral Fellow  "

## 2019-04-04 NOTE — PROGRESS NOTES
"[facility] - [facility]    Name:  Shawn Camejo   Aliases:    :  1980   MRN:  [id number]  Treating Provider: Bhargav Nolasco PA-C EdD     Medications at start of visit:  Outpatient Medications Prior to Visit   Medication Sig Dispense Refill     sertraline (ZOLOFT) 100 MG tablet Take 100 mg by mouth       naltrexone (DEPADE/REVIA) 50 MG tablet Take 1/2 tablet daily for 2 weeks, then take 1 tablet daily 30 tablet 3     No facility-administered medications prior to visit.        Allergies:  Allergies   Allergen Reactions     No Known Allergies        Today's Visit    Current Complaint: Ramon presents for a recheck.  He states that he has been struggling with relapses.  He states that he has had issues with compulsive spending, gambling and compulsive sexual behavior.  He describes boundary violations with pornography, dating sites.  He states that he is becoming more honest with himself about his boundary violations and their frequency.  He states that his main issues are related to strip clubs and that there may be some \"leveling out\" of his phone use.   He states that he is procrastinating on self care issues such as not doing laundry, not shaving.  These behaviors lead to more isolation and subsequent issues with work attendance.  He states that he has been off of his meds for the last 2 weeks    Ramon states that he has had around $1500 in gambling losses over the last 5 months in addition to $1500 for prostitutes during the same period.      Review of Systems: A review of the following systems was negative: Opthalmic, ENT, Cardiovascular, Gastrointestinal, Genitourinary, Musculoskeletal, Integumentary, Neurological, Endocrine, Respiratory, Hematologic, Immunologic      Chemical History: Denies usage of and cravings for alcohol, cannabis, heroin, methamphetamine, cocaine, prescription opioids/benzodiazepines.      Social History: As above.  Ramon states that he has to file taxes for 8014-0118 this year. "     Mental Status Exam: The patient's orientation, memory,  Attention, language and fund of knowledge are at their usual best baseline.  Grooming/Hygiene: adequate  Eye Contact: normal  Psychomotor: normal  Observed mood and affect: appropriate  Judgment: intact, with thoughtful decision making and insight  Speech: normal rate, rhythm, tone and volume  Thought processes: normal, with normal rate of thought  Associations:  No deficiency  Abnormal Thoughts: none        Assessment: This is a 37 yo male who carries the dx of conduct disorder, gambling disorder.  The patient's questions were answered to their satisfaction regarding the plan as outlined below. Side effects, risks/benefits/alternatives regarding all psychiatric medications were discussed with the patient in detail.               Plan:  1)Naltrexone 50 mg: Take 1 tablet daily.  We will want to check your liver function blood tests in 1 month.   If the liver is normal, we will likely increase to 100 mg per day.     2)Sertraline 100 mg: Take 1 tablet daily. We will likely increase to 150 mg after you have been on a steady dose of 100 mg.      3)Continue your individual and group therapy.     4)I recommend an additional 12 step group for CSB.       Total face-to-face time: 30 min    Counseling/Coordination of care greater than 50%.    Counseling/Coordination of care included: Educational counseling regarding psychiatric medications, side effects, interactions.

## 2019-04-04 NOTE — NURSING NOTE
"Chief Complaint   Patient presents with     RECHECK     med management       Vitals:    04/04/19 1831   BP: 125/82   Pulse: 77   Weight: 97.9 kg (215 lb 12.8 oz)   Height: 1.753 m (5' 9\")       Body mass index is 31.87 kg/m .      Roopa Meléndez CMA    "

## 2019-04-04 NOTE — PROGRESS NOTES
[place] [facility]   Group Progress Note    Date of Service: 4/04/19  Client Name: Shawn Camejo  YOB: 1980  MRN:  [id number]  Treating Provider: Sydney Will, Ph.D. Postdoctoral Fellow    Type of Session: Group  Number of Minutes:  120     Health Maintenance Summary - Mental Health Treatment Plan       Status Date      Mental Health Tx Plan Q11 MOS Next Due 8/28/2019      Done 9/28/2018         Current Symptoms/Status:  The client meets criteria for Other Specified Disruptive, Impulsive-Control, and Conduct Disorder (hypersexuality), which includes behavioral symptoms that cause clinically significant distress and impair social functioning. There appears to be evidence that client's behaviors are sexually compulsive in nature.     The client meets criteria for Major Depressive Disorder, Recurrent Episode, Mild, which includes experiencing anhedonia, worthlessness, hypersomnia, fatigue, and a depressed mood more days than not for a period of at least two weeks.    Client endorses the following anxiety symptoms: difficulty controlling worry; restlessness or feeling keyed up or on edge; being easily fatigued; difficulty concentrating or mind going blank; irritability; muscle tension; and sleep disturbance (difficulty falling or staying asleep, or restless unsatisfying sleep).    The client meets criteria for Posttraumatic Stress Disorder, which includes dissociative reactions, persistent avoidance of distressing memories, negative alternations in cognitions and mood associated with the event, and marked alterations in arousal and reactivity associated with the event. The symptoms have been present for at least one month and cause clinically significant distress or impairment.      Progress Toward Treatment Goals:   The client continues to show interest and commitment towards receiving services at [facility].  The client reported desire to be more proactive in his recovery.  The client plans to  meet with Dr. Will later today to discuss joining a CSB group.      Intervention: Modality and Response:  Client fully oriented to person, place, and time. Therapist employed client-centered and CBT interventions to explore client's compulsive sexual behaviors and ongoing mental health concerns.           Assignment:      Interactive Complexity:  N/A      Diagnosis:  312.89 Other Specified Disruptive, Impulsive-Control, and Conduct Disorder (hypersexuality) (F91.8)  296.31 Major Depressive Disorder, mild, recurrent (F33.0)  300.02 Generalized Anxiety Disorder (F41.1)  309.81 Posttraumatic Stress Disorder (F43.10)      Plan / Need for Future Services:  Return for therapy, as needed.        Sydney Will, PhD  Postdoctoral Fellow

## 2019-04-05 ASSESSMENT — ANXIETY QUESTIONNAIRES
1. FEELING NERVOUS, ANXIOUS, OR ON EDGE: MORE THAN HALF THE DAYS
5. BEING SO RESTLESS THAT IT IS HARD TO SIT STILL: NOT AT ALL
7. FEELING AFRAID AS IF SOMETHING AWFUL MIGHT HAPPEN: MORE THAN HALF THE DAYS
6. BECOMING EASILY ANNOYED OR IRRITABLE: MORE THAN HALF THE DAYS
3. WORRYING TOO MUCH ABOUT DIFFERENT THINGS: SEVERAL DAYS
GAD7 TOTAL SCORE: 9
2. NOT BEING ABLE TO STOP OR CONTROL WORRYING: SEVERAL DAYS

## 2019-04-05 ASSESSMENT — PATIENT HEALTH QUESTIONNAIRE - PHQ9
SUM OF ALL RESPONSES TO PHQ QUESTIONS 1-9: 10
5. POOR APPETITE OR OVEREATING: SEVERAL DAYS

## 2019-04-05 NOTE — PATIENT INSTRUCTIONS
1)Naltrexone 50 mg: Take 1 tablet daily.  We will want to check your liver function blood tests in 1 month.   If the liver is normal, we will likely increase to 100 mg per day.     2)Sertraline 100 mg: Take 1 tablet daily. We will likely increase to 150 mg after you have been on a steady dose of 100 mg.      3)Continue your individual and group therapy.     4)I recommend an additional 12 step group for CSB.

## 2019-04-06 ASSESSMENT — ANXIETY QUESTIONNAIRES: GAD7 TOTAL SCORE: 9

## 2019-04-11 ENCOUNTER — OFFICE VISIT (OUTPATIENT)
Dept: OTHER | Facility: OUTPATIENT CENTER | Age: 39
End: 2019-04-11
Payer: COMMERCIAL

## 2019-04-11 DIAGNOSIS — F33.0 MAJOR DEPRESSIVE DISORDER, RECURRENT EPISODE, MILD (H): ICD-10-CM

## 2019-04-11 DIAGNOSIS — F91.8 OTHER CONDUCT DISORDERS: Primary | ICD-10-CM

## 2019-04-11 DIAGNOSIS — F43.10 POSTTRAUMATIC STRESS DISORDER: ICD-10-CM

## 2019-04-11 DIAGNOSIS — F41.1 GENERALIZED ANXIETY DISORDER: ICD-10-CM

## 2019-04-11 NOTE — PROGRESS NOTES
[place] [facility]   Group Progress Note    Date of Service: 4/11/19  Client Name: Shawn Camejo  YOB: 1980  MRN:  [id number]  Treating Provider: Sydney Will, Ph.D. Postdoctoral Fellow    Type of Session: Group  Number of Minutes: 90    Health Maintenance Summary - Mental Health Treatment Plan       Status Date      Mental Health Tx Plan Q11 MOS Next Due 8/28/2019      Done 9/28/2018         Current Symptoms/Status:  The client meets criteria for Other Specified Disruptive, Impulsive-Control, and Conduct Disorder (hypersexuality), which includes behavioral symptoms that cause clinically significant distress and impair social functioning.      The client meets criteria for Major Depressive Disorder, Recurrent Episode, Mild, which includes experiencing anhedonia, worthlessness, hypersomnia, fatigue, and a depressed mood more days than not for a period of at least two weeks.     Client endorses the following anxiety symptoms: difficulty controlling worry; restlessness or feeling keyed up or on edge; being easily fatigued; difficulty concentrating or mind going blank; irritability; muscle tension; and sleep disturbance (difficulty falling or staying asleep, or restless unsatisfying sleep).     The client meets criteria for Posttraumatic Stress Disorder, which includes dissociative reactions, persistent avoidance of distressing memories, negative alternations in cognitions and mood associated with the event, and marked alterations in arousal and reactivity associated with the event. The symptoms have been present for at least one month and cause clinically significant distress or impairment.    Progress Toward Treatment Goals:   The client reported some difficulties with mood and struggling with avoidance.       Intervention: Modality and Response:  Utilized supportive and CBT techniques were used to address CSB and related mental health issues. Client shared that he has major depressive  disorder. He also shared what his CSB consisted of. He noted struggling to not engage in CSB. Discussed consequences of CSB. Discussed his boundaries, which are in excess of realistic goals.       Assignment:  Come up with one boundary.     Interactive Complexity:  N/A      Diagnosis:  312.89 Other Specified Disruptive, Impulsive-Control, and Conduct Disorder (hypersexuality) (F91.8)  296.31 Major Depressive Disorder, mild, recurrent (F33.0)  300.02 Generalized Anxiety Disorder (F41.1)  309.81 Posttraumatic Stress Disorder (F43.10)      Plan / Need for Future Services:  Return for therapy, as needed.        ySdney Will, PhD  Postdoctoral Fellow

## 2019-04-11 NOTE — PROGRESS NOTES
[place] [facility] -  Case Progress Note    Date of Service: 4/11/19  Client Name: Shawn Camejo  YOB: 1980  MRN:  [id number]  Treating Provider: Lux Bravo, PhD  Type of Session: Individual  Present in Session: Patient only  Number of Minutes:  53    Health Maintenance Summary - Mental Health Treatment Plan       Status Date      Mental Health Tx Plan Q11 MOS Next Due 8/28/2019      Done 9/28/2018         Current Symptoms/Status:  The client meets criteria for Other Specified Disruptive, Impulsive-Control, and Conduct Disorder (hypersexuality), which includes behavioral symptoms that cause clinically significant distress and impair social functioning. There appears to be evidence that client's behaviors are sexually compulsive in nature.     The client meets criteria for Major Depressive Disorder, Recurrent Episode, Mild, which includes experiencing anhedonia, worthlessness, hypersomnia, fatigue, and a depressed mood more days than not for a period of at least two weeks.    Client endorses the following anxiety symptoms: difficulty controlling worry; restlessness or feeling keyed up or on edge; being easily fatigued; difficulty concentrating or mind going blank; irritability; muscle tension; and sleep disturbance (difficulty falling or staying asleep, or restless unsatisfying sleep).    The client meets criteria for Posttraumatic Stress Disorder, which includes dissociative reactions, persistent avoidance of distressing memories, negative alternations in cognitions and mood associated with the event, and marked alterations in arousal and reactivity associated with the event. The symptoms have been present for at least one month and cause clinically significant distress or impairment.      Progress Toward Treatment Goals:   The client continues to show interest and commitment towards receiving services at [facility].  The client joined CSB group 4/4/19.      Intervention: Modality and  Response:  Client fully oriented to person, place, and time. Therapist employed client-centered and CBT interventions to explore client's compulsive sexual behaviors and ongoing mental health concerns. Client spoke about increased accountability as a result of joining group, and plans to check in to group today about previous dishonest behaviors. He reported difficulty remaining sober from pornography use, and continues to find ways around porn blockers on his phone. Client identified CSB group members and 12-step members as support for these behaviors. Discussed dishonest behaviors in the context of previous trauma. Client was open and receptive to feedback.      Assignment:  -Stay connected with sponsor.  -Continue attending CSB group.      Interactive Complexity:  N/A      Diagnosis:  312.89 Other Specified Disruptive, Impulsive-Control, and Conduct Disorder (hypersexuality) (F91.8)  296.31 Major Depressive Disorder, mild, recurrent (F33.0)  300.02 Generalized Anxiety Disorder (F41.1)  309.81 Posttraumatic Stress Disorder (F43.10)      Plan / Need for Future Services:  Return for therapy in one week.     Lux Bravo, PhD  Postdoctoral Fellow

## 2019-04-14 NOTE — PROGRESS NOTES
I did not personally see the patient but I have reviewed and agree with the assessment and plan as documented in this note.    Venita Lares, Phd, LP

## 2019-04-14 NOTE — PROGRESS NOTES
I did not personally attend the group therapy session.  I reviewed and agree with the assessment and plan as documented in this note.    Venita Lares, Ph.D, LP

## 2019-04-18 ENCOUNTER — OFFICE VISIT (OUTPATIENT)
Dept: OTHER | Facility: OUTPATIENT CENTER | Age: 39
End: 2019-04-18
Payer: COMMERCIAL

## 2019-04-18 DIAGNOSIS — F91.8 OTHER CONDUCT DISORDERS: Primary | ICD-10-CM

## 2019-04-18 DIAGNOSIS — F41.1 GENERALIZED ANXIETY DISORDER: ICD-10-CM

## 2019-04-18 DIAGNOSIS — F43.10 POSTTRAUMATIC STRESS DISORDER: ICD-10-CM

## 2019-04-18 DIAGNOSIS — F33.0 MAJOR DEPRESSIVE DISORDER, RECURRENT EPISODE, MILD (H): ICD-10-CM

## 2019-04-18 NOTE — PROGRESS NOTES
[place] [facility] -  Case Progress Note    Date of Service: 4/18/19  Client Name: Shawn Camejo  YOB: 1980  MRN:  [id number]  Treating Provider: Lux Bravo, PhD  Type of Session: Individual  Present in Session: Patient only  Number of Minutes:  53    Health Maintenance Summary - Mental Health Treatment Plan       Status Date      Mental Health Tx Plan Q11 MOS Next Due 8/28/2019      Done 9/28/2018         Current Symptoms/Status:  The client meets criteria for Other Specified Disruptive, Impulsive-Control, and Conduct Disorder (hypersexuality), which includes behavioral symptoms that cause clinically significant distress and impair social functioning. There appears to be evidence that client's behaviors are sexually compulsive in nature.     The client meets criteria for Major Depressive Disorder, Recurrent Episode, Mild, which includes experiencing anhedonia, worthlessness, hypersomnia, fatigue, and a depressed mood more days than not for a period of at least two weeks.    Client endorses the following anxiety symptoms: difficulty controlling worry; restlessness or feeling keyed up or on edge; being easily fatigued; difficulty concentrating or mind going blank; irritability; muscle tension; and sleep disturbance (difficulty falling or staying asleep, or restless unsatisfying sleep).    The client meets criteria for Posttraumatic Stress Disorder, which includes dissociative reactions, persistent avoidance of distressing memories, negative alternations in cognitions and mood associated with the event, and marked alterations in arousal and reactivity associated with the event. The symptoms have been present for at least one month and cause clinically significant distress or impairment.      Progress Toward Treatment Goals:   The client continues to show interest and commitment towards receiving services at [facility].  The client joined CSB group 4/4/19.      Intervention: Modality and  Response:  Client fully oriented to person, place, and time. Therapist employed client-centered and CBT interventions to explore client's compulsive sexual behaviors and ongoing mental health concerns. Client reported recent relapse of going on a date with a woman he met at a strip club. Discussed shame of engaging in this behavior and not telling a close friend about it. He identified the need for additional support via 12-step meetings, and therapist provided client with list of local [facility] groups that align with his schedule. Discussed the importance of discussing this relapse in group, and he seemed open to the idea. Therapist encouraged client to connect with friend about recent relapse in order to foster authentic relationships.      Assignment:  -Stay connected with sponsor.  -Continue attending CSB group.      Interactive Complexity:  N/A      Diagnosis:  312.89 Other Specified Disruptive, Impulsive-Control, and Conduct Disorder (hypersexuality) (F91.8)  296.31 Major Depressive Disorder, mild, recurrent (F33.0)  300.02 Generalized Anxiety Disorder (F41.1)  309.81 Posttraumatic Stress Disorder (F43.10)      Plan / Need for Future Services:  Return for therapy in one week.     Lux Bravo, PhD  Postdoctoral Fellow

## 2019-04-25 ENCOUNTER — OFFICE VISIT (OUTPATIENT)
Dept: OTHER | Facility: OUTPATIENT CENTER | Age: 39
End: 2019-04-25
Payer: COMMERCIAL

## 2019-04-25 DIAGNOSIS — F43.10 POSTTRAUMATIC STRESS DISORDER: ICD-10-CM

## 2019-04-25 DIAGNOSIS — F91.8 OTHER CONDUCT DISORDERS: Primary | ICD-10-CM

## 2019-04-25 DIAGNOSIS — F41.1 GENERALIZED ANXIETY DISORDER: ICD-10-CM

## 2019-04-25 DIAGNOSIS — F10.21 ALCOHOL USE DISORDER, MODERATE, IN EARLY REMISSION, DEPENDENCE (H): Primary | ICD-10-CM

## 2019-04-25 DIAGNOSIS — F33.0 MAJOR DEPRESSIVE DISORDER, RECURRENT EPISODE, MILD (H): ICD-10-CM

## 2019-04-25 DIAGNOSIS — F91.8 OTHER CONDUCT DISORDERS: ICD-10-CM

## 2019-04-25 NOTE — PROGRESS NOTES
[place] [facility]   Group Progress Note    Date of Service: 4/25/19  Client Name: Shawn Camejo  YOB: 1980  MRN:  [id number]  Treating Provider: Venita Lares, PhD  Type of Session: Group  Number of Minutes: 120    Health Maintenance Summary - Mental Health Treatment Plan       Status Date      Mental Health Tx Plan Q11 MOS Next Due 8/28/2019      Done 9/28/2018         Current Symptoms/Status:  The client meets criteria for Other Specified Disruptive, Impulsive-Control, and Conduct Disorder (hypersexuality), which includes behavioral symptoms that cause clinically significant distress and impair social functioning. He has continued to struggle staying within boundaries but he also is unsure of what boundaries to set for himself.     The client meets criteria for Major Depressive Disorder, Recurrent Episode, Mild, which includes experiencing anhedonia, worthlessness, hypersomnia, fatigue, and a depressed mood more days than not for a period of at least two weeks.  Symptoms persist.     Client endorses the following anxiety symptoms: difficulty controlling worry; restlessness or feeling keyed up or on edge; being easily fatigued; difficulty concentrating or mind going blank; irritability; muscle tension; and sleep disturbance (difficulty falling or staying asleep, or restless unsatisfying sleep).  Symptoms persist.     The client meets criteria for Posttraumatic Stress Disorder, which includes dissociative reactions, persistent avoidance of distressing memories, negative alternations in cognitions and mood associated with the event, and marked alterations in arousal and reactivity associated with the event. The symptoms have been present for at least one month and cause clinically significant distress or impairment.  Symptoms persist.    Alcohol Use Disorder:  Recently had a slip.  Has been trying to stay sober since.    Progress Toward Treatment Goals:   The client reported difficulties with  mood and struggling with avoidance.         Intervention: Modality and Response:  Utilized supportive and CBT techniques were used to address CSB and related mental health issues.  Discussed his boundaries, and he offered some ideas for group feedback.  Feels that he has a better sense of how to set boundaries for himself.      Assignment:  Finalize boundaries for next session    Interactive Complexity:  N/A      Diagnosis:  312.89 Other Specified Disruptive, Impulsive-Control, and Conduct Disorder (hypersexuality) (F91.8)  296.31 Major Depressive Disorder, mild, recurrent (F33.0)  300.02 Generalized Anxiety Disorder (F41.1)  309.81 Posttraumatic Stress Disorder (F43.10)  Alcohol use Disorder.      Plan / Need for Future Services:  Return for therapy, as needed.      chris Lares, PhD, LP

## 2019-04-25 NOTE — PROGRESS NOTES
[place] [facility] -  Case Progress Note    Date of Service: 4/25/19  Client Name: Shawn Camejo  YOB: 1980  MRN:  [id number]  Treating Provider: Lux Bravo, PhD  Type of Session: Individual  Present in Session: Patient only  Number of Minutes:  53    Health Maintenance Summary - Mental Health Treatment Plan       Status Date      Mental Health Tx Plan Q11 MOS Next Due 8/28/2019      Done 9/28/2018         Current Symptoms/Status:  The client meets criteria for Other Specified Disruptive, Impulsive-Control, and Conduct Disorder (hypersexuality), which includes behavioral symptoms that cause clinically significant distress and impair social functioning. There appears to be evidence that client's behaviors are sexually compulsive in nature.     The client meets criteria for Major Depressive Disorder, Recurrent Episode, Mild, which includes experiencing anhedonia, worthlessness, hypersomnia, fatigue, and a depressed mood more days than not for a period of at least two weeks.    Client endorses the following anxiety symptoms: difficulty controlling worry; restlessness or feeling keyed up or on edge; being easily fatigued; difficulty concentrating or mind going blank; irritability; muscle tension; and sleep disturbance (difficulty falling or staying asleep, or restless unsatisfying sleep).    The client meets criteria for Posttraumatic Stress Disorder, which includes dissociative reactions, persistent avoidance of distressing memories, negative alternations in cognitions and mood associated with the event, and marked alterations in arousal and reactivity associated with the event. The symptoms have been present for at least one month and cause clinically significant distress or impairment.      Progress Toward Treatment Goals:   The client continues to show interest and commitment towards receiving services at [facility].  The client joined CSB group 4/4/19.      Intervention: Modality and  Response:  Client fully oriented to person, place, and time. Therapist employed client-centered and CBT interventions to explore client's compulsive sexual behaviors and ongoing mental health concerns. Client reported increased contact with sponsor and CSB group members over the last week. We spent the session processing last week's boundary violations and strengthened his boundaries on when it is acceptable to spend money. Therapist utilized white board for boundary and cycle activities, and client was receptive to this intervention; reported gaining insight. He took a picture of the written interventions and therapist encouraged him to reflect on this conversation during CSB group.      Assignment:  -Stay connected with sponsor.  -Continue attending CSB group.      Interactive Complexity:  N/A      Diagnosis:  312.89 Other Specified Disruptive, Impulsive-Control, and Conduct Disorder (hypersexuality) (F91.8)  296.31 Major Depressive Disorder, mild, recurrent (F33.0)  300.02 Generalized Anxiety Disorder (F41.1)  309.81 Posttraumatic Stress Disorder (F43.10)      Plan / Need for Future Services:  Return for therapy in one week.     Lux Bravo, PhD  Postdoctoral Fellow

## 2019-04-29 PROBLEM — F10.21 ALCOHOL USE DISORDER, MODERATE, IN EARLY REMISSION, DEPENDENCE (H): Status: ACTIVE | Noted: 2019-04-29

## 2019-04-30 ENCOUNTER — TELEPHONE (OUTPATIENT)
Dept: OTHER | Facility: OUTPATIENT CENTER | Age: 39
End: 2019-04-30

## 2019-04-30 ENCOUNTER — OFFICE VISIT (OUTPATIENT)
Dept: OTHER | Facility: OUTPATIENT CENTER | Age: 39
End: 2019-04-30
Payer: COMMERCIAL

## 2019-04-30 DIAGNOSIS — F91.8 OTHER CONDUCT DISORDERS: Primary | ICD-10-CM

## 2019-04-30 DIAGNOSIS — F43.10 POSTTRAUMATIC STRESS DISORDER: ICD-10-CM

## 2019-04-30 DIAGNOSIS — F41.1 GENERALIZED ANXIETY DISORDER: ICD-10-CM

## 2019-04-30 DIAGNOSIS — F33.0 MAJOR DEPRESSIVE DISORDER, RECURRENT EPISODE, MILD (H): ICD-10-CM

## 2019-04-30 DIAGNOSIS — F10.21 ALCOHOL USE DISORDER, MODERATE, IN EARLY REMISSION, DEPENDENCE (H): ICD-10-CM

## 2019-04-30 NOTE — PROGRESS NOTES
I did not personally see the patient.  I reviewed and agree with the assessment and plan as documented in this note.     Ginger Webb PsyD, LP

## 2019-04-30 NOTE — PROGRESS NOTES
[place] [facility]   Group Progress Note    Date of Service: 4/18/19  Client Name: Shawn Camejo  YOB: 1980  MRN:  [id number]  Treating Provider: Venita Lares, PhD and Sydney Will, Ph.D. Postdoctoral Fellow    Type of Session: Group  Number of Minutes: 90    Health Maintenance Summary - Mental Health Treatment Plan       Status Date      Mental Health Tx Plan Q11 MOS Next Due 8/28/2019      Done 9/28/2018         Current Symptoms/Status:  The client meets criteria for Other Specified Disruptive, Impulsive-Control, and Conduct Disorder (hypersexuality), which includes behavioral symptoms that cause clinically significant distress and impair social functioning.      The client meets criteria for Major Depressive Disorder, Recurrent Episode, Mild, which includes experiencing anhedonia, worthlessness, hypersomnia, fatigue, and a depressed mood more days than not for a period of at least two weeks.     Client endorses the following anxiety symptoms: difficulty controlling worry; restlessness or feeling keyed up or on edge; being easily fatigued; difficulty concentrating or mind going blank; irritability; muscle tension; and sleep disturbance (difficulty falling or staying asleep, or restless unsatisfying sleep).     The client meets criteria for Posttraumatic Stress Disorder, which includes dissociative reactions, persistent avoidance of distressing memories, negative alternations in cognitions and mood associated with the event, and marked alterations in arousal and reactivity associated with the event. The symptoms have been present for at least one month and cause clinically significant distress or impairment.    Progress Toward Treatment Goals:   The client reported some difficulties with mood and struggling with avoidance. Had relapse on alcohol.      Intervention: Modality and Response:  Utilized supportive and CBT techniques were used to address CSB and related mental health issues.Had  relapse on alcohol.  Processed factors related to this.  Very distraught.  Felt more hopeful after today's session.    Assignment:  Maintain sobriety  Come up with one sexual boundary.     Interactive Complexity:  N/A      Diagnosis:  312.89 Other Specified Disruptive, Impulsive-Control, and Conduct Disorder (hypersexuality) (F91.8)  296.31 Major Depressive Disorder, mild, recurrent (F33.0)  300.02 Generalized Anxiety Disorder (F41.1)  Alcohol Use Disorder  309.81 Posttraumatic Stress Disorder (F43.10)      Plan / Need for Future Services:  Return for therapy, as needed.        Venita Lares, PhD, LP

## 2019-04-30 NOTE — PROGRESS NOTES
[facility]  [facility] Progress Note    Date of Service: 4/30/19  Client Name: Shawn Camejo  YOB: 1980  MRN:  [id number]  Treating Provider: Lux Bravo, PhD Postdoctoral Fellow  Type of Session: Individual  Present in Session: Patient only  Number of Minutes:  53    Health Maintenance Summary - Mental Health Treatment Plan       Status Date      Mental Health Tx Plan Q11 MOS Next Due 8/28/2019      Done 9/28/2018         Current Symptoms/Status:  The client meets criteria for Other Specified Disruptive, Impulsive-Control, and Conduct Disorder (hypersexuality), which includes behavioral symptoms that cause clinically significant distress and impair social functioning. There appears to be evidence that client's behaviors are sexually compulsive in nature.     The client meets criteria for Major Depressive Disorder, Recurrent Episode, Mild, which includes experiencing anhedonia, worthlessness, hypersomnia, fatigue, and a depressed mood more days than not for a period of at least two weeks.    Client endorses the following anxiety symptoms: difficulty controlling worry; restlessness or feeling keyed up or on edge; being easily fatigued; difficulty concentrating or mind going blank; irritability; muscle tension; and sleep disturbance (difficulty falling or staying asleep, or restless unsatisfying sleep).    The client meets criteria for Posttraumatic Stress Disorder, which includes dissociative reactions, persistent avoidance of distressing memories, negative alternations in cognitions and mood associated with the event, and marked alterations in arousal and reactivity associated with the event. The symptoms have been present for at least one month and cause clinically significant distress or impairment.    The client meets criteria for Alcohol Use Disorder (Moderate), which includes consuming alcohol in larger amounts than intended, giving up social/recreational activities due to use,  continued use despite negative consequences (eg, CSB), and unsuccessful efforts to cut down alcohol use.      Progress Toward Treatment Goals:   The client continues to show interest and commitment towards receiving services at [facility].  The client joined CSB group 4/4/19.      Intervention: Modality and Response:  Client fully oriented to person, place, and time. Therapist employed client-centered and CBT interventions to explore client's compulsive sexual behaviors and ongoing mental health concerns. Client reported decreased connection with sponsor and CSB group because he has been trying to pursue a relationship with a stripper. He identified reasons why pursuing this relationship jeopardizes his recovery. Therapist reflected his lack of connection, honesty, and authenticity to others when he engages with this relationship. He discussed plan to end this relationship in order to focus on his recovery, and he reported gaining validation and insight.      Assignment:  -Stay connected with sponsor.  -Continue attending CSB group.      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     Lux Bravo, PhD  Postdoctoral Fellow

## 2019-05-02 ENCOUNTER — OFFICE VISIT (OUTPATIENT)
Dept: OTHER | Facility: OUTPATIENT CENTER | Age: 39
End: 2019-05-02
Payer: COMMERCIAL

## 2019-05-02 DIAGNOSIS — F43.10 POSTTRAUMATIC STRESS DISORDER: ICD-10-CM

## 2019-05-02 DIAGNOSIS — F41.1 GENERALIZED ANXIETY DISORDER: ICD-10-CM

## 2019-05-02 DIAGNOSIS — F91.8 OTHER CONDUCT DISORDERS: Primary | ICD-10-CM

## 2019-05-02 DIAGNOSIS — F10.21 ALCOHOL USE DISORDER, MODERATE, IN EARLY REMISSION, DEPENDENCE (H): ICD-10-CM

## 2019-05-02 DIAGNOSIS — F33.0 MAJOR DEPRESSIVE DISORDER, RECURRENT EPISODE, MILD (H): ICD-10-CM

## 2019-05-02 NOTE — PROGRESS NOTES
[facility]   Group Progress Note    Date of Service: 5/02/19  Client Name: Shawn Camejo  YOB: 1980  MRN:  [id number]  Treating Provider: Venita Lares, PhD  Type of Session: Group  Number of Minutes: 120    Health Maintenance Summary - Mental Health Treatment Plan       Status Date      Mental Health Tx Plan Q11 MOS Next Due 8/28/2019      Done 9/28/2018         Current Symptoms/Status:  The client meets criteria for Other Specified Disruptive, Impulsive-Control, and Conduct Disorder (hypersexuality), which includes behavioral symptoms that cause clinically significant distress and impair social functioning. He has continued to struggle staying within boundaries but he also is unsure of what boundaries to set for himself.     The client meets criteria for Major Depressive Disorder, Recurrent Episode, Mild, which includes experiencing anhedonia, worthlessness, hypersomnia, fatigue, and a depressed mood more days than not for a period of at least two weeks.  Symptoms persist.     Client endorses the following anxiety symptoms: difficulty controlling worry; restlessness or feeling keyed up or on edge; being easily fatigued; difficulty concentrating or mind going blank; irritability; muscle tension; and sleep disturbance (difficulty falling or staying asleep, or restless unsatisfying sleep).  Symptoms persist.     The client meets criteria for Posttraumatic Stress Disorder, which includes dissociative reactions, persistent avoidance of distressing memories, negative alternations in cognitions and mood associated with the event, and marked alterations in arousal and reactivity associated with the event. The symptoms have been present for at least one month and cause clinically significant distress or impairment.      CSB:  Symptoms persist.    Alcohol Use Disorder:  Continues to break boundaries. Is triggering to his CSB.  Needs to work on sobriety.    Progress Toward Treatment  Goals:   The client reported difficulties with mood and struggling with avoidance.       Intervention: Modality and Response:  Utilized supportive and CBT techniques were used to address CSB and related mental health issues.      Is very dysregulated and not able to say within any of his boundaries.  Still abusing alcohol.  Triggering for his CSB.  Has not been taking his medications regularly.  Encouraged him to work on boundary of taking his medications regularly.  Work on staying within his alcohol boundaries.   Needs to connect with his sponsor.        Assignment:  Stay within defined boundaries.    Interactive Complexity:  N/A      Diagnosis:  312.89 Other Specified Disruptive, Impulsive-Control, and Conduct Disorder (hypersexuality) (F91.8)  296.31 Major Depressive Disorder, mild, recurrent (F33.0)  300.02 Generalized Anxiety Disorder (F41.1)  309.81 Posttraumatic Stress Disorder (F43.10)  Alcohol use Disorder.      Plan / Need for Future Services:  Return for therapy, as needed.      chris Lares, PhD, LP

## 2019-05-09 ENCOUNTER — OFFICE VISIT (OUTPATIENT)
Dept: OTHER | Facility: OUTPATIENT CENTER | Age: 39
End: 2019-05-09
Payer: COMMERCIAL

## 2019-05-09 DIAGNOSIS — F41.1 GENERALIZED ANXIETY DISORDER: ICD-10-CM

## 2019-05-09 DIAGNOSIS — F33.0 MAJOR DEPRESSIVE DISORDER, RECURRENT EPISODE, MILD (H): ICD-10-CM

## 2019-05-09 DIAGNOSIS — F10.21 ALCOHOL USE DISORDER, MODERATE, IN EARLY REMISSION, DEPENDENCE (H): ICD-10-CM

## 2019-05-09 DIAGNOSIS — F43.10 POSTTRAUMATIC STRESS DISORDER: ICD-10-CM

## 2019-05-09 DIAGNOSIS — F91.8 OTHER CONDUCT DISORDERS: Primary | ICD-10-CM

## 2019-05-09 NOTE — PROGRESS NOTES
[facility]   Group Progress Note    Date of Service: 5/09/19  Client Name: Shawn Camejo  YOB: 1980  MRN:  [id number]  Treating Provider: Venita Lares, PhD and Sydney Will, PhD  Type of Session: Group  Number of Minutes: 120    Health Maintenance Summary - Mental Health Treatment Plan       Status Date      Mental Health Tx Plan Q11 MOS Next Due 8/28/2019      Done 9/28/2018         Current Symptoms/Status:  The client meets criteria for Other Specified Disruptive, Impulsive-Control, and Conduct Disorder (hypersexuality), which includes behavioral symptoms that cause clinically significant distress and impair social functioning. He has continued to struggle staying within boundaries but he also is unsure of what boundaries to set for himself.     The client meets criteria for Major Depressive Disorder, Recurrent Episode, Mild, which includes experiencing anhedonia, worthlessness, hypersomnia, fatigue, and a depressed mood more days than not for a period of at least two weeks.  Symptoms persist.     Client endorses the following anxiety symptoms: difficulty controlling worry; restlessness or feeling keyed up or on edge; being easily fatigued; difficulty concentrating or mind going blank; irritability; muscle tension; and sleep disturbance (difficulty falling or staying asleep, or restless unsatisfying sleep).  Symptoms persist.     The client meets criteria for Posttraumatic Stress Disorder, which includes dissociative reactions, persistent avoidance of distressing memories, negative alternations in cognitions and mood associated with the event, and marked alterations in arousal and reactivity associated with the event. The symptoms have been present for at least one month and cause clinically significant distress or impairment.      CSB:  Symptoms persist.    Alcohol Use Disorder:  Continues to break boundaries. Is triggering to his CSB.  Needs to work on sobriety.    Progress  Toward Treatment Goals:   The client reported difficulties with mood and struggling with avoidance.       Intervention: Modality and Response:  Utilized supportive and CBT techniques were used to address CSB and related mental health issues.      Is very dysregulated and not able to stay within any of his boundaries.  Still abusing alcohol.  Triggering for his CSB.  Taking his medications a bit more regularly - but probably still too inconsistency.    Work on staying within his alcohol boundaries.   Needs to connect with his sponsor.  Get back to meetings.        Assignment:  Stay within defined boundaries.    Interactive Complexity:  N/A      Diagnosis:  312.89 Other Specified Disruptive, Impulsive-Control, and Conduct Disorder (hypersexuality) (F91.8)  296.31 Major Depressive Disorder, mild, recurrent (F33.0)  300.02 Generalized Anxiety Disorder (F41.1)  309.81 Posttraumatic Stress Disorder (F43.10)  Alcohol use Disorder.      Plan / Need for Future Services:  Return for group therapy on weekly basis.  Continue seeing Dr.. Bravo 2X a month jeremy Lares, PhD, LP

## 2019-05-16 ENCOUNTER — OFFICE VISIT (OUTPATIENT)
Dept: OTHER | Facility: OUTPATIENT CENTER | Age: 39
End: 2019-05-16
Payer: COMMERCIAL

## 2019-05-16 ENCOUNTER — TELEPHONE (OUTPATIENT)
Dept: OTHER | Facility: OUTPATIENT CENTER | Age: 39
End: 2019-05-16

## 2019-05-16 DIAGNOSIS — F43.10 POSTTRAUMATIC STRESS DISORDER: ICD-10-CM

## 2019-05-16 DIAGNOSIS — F10.21 ALCOHOL USE DISORDER, MODERATE, IN EARLY REMISSION, DEPENDENCE (H): ICD-10-CM

## 2019-05-16 DIAGNOSIS — F91.8 OTHER CONDUCT DISORDERS: Primary | ICD-10-CM

## 2019-05-16 DIAGNOSIS — F33.0 MAJOR DEPRESSIVE DISORDER, RECURRENT EPISODE, MILD (H): ICD-10-CM

## 2019-05-16 DIAGNOSIS — F41.1 GENERALIZED ANXIETY DISORDER: ICD-10-CM

## 2019-05-16 NOTE — PROGRESS NOTES
[facility]   Group Progress Note    Date of Service: 5/16/19  Client Name: Shawn Camejo  YOB: 1980  MRN:  [id number]  Treating Provider: Venita Lares, PhD and Sydney Will, PhD  Type of Session: Group  Number of Minutes: 120    Health Maintenance Summary - Mental Health Treatment Plan       Status Date      Mental Health Tx Plan Q11 MOS Next Due 8/28/2019      Done 9/28/2018         Current Symptoms/Status:  The client meets criteria for Other Specified Disruptive, Impulsive-Control, and Conduct Disorder (hypersexuality), which includes behavioral symptoms that cause clinically significant distress and impair social functioning. He has continued to struggle staying within boundaries but he also is unsure of what boundaries to set for himself.     The client meets criteria for Major Depressive Disorder, Recurrent Episode, Mild, which includes experiencing anhedonia, worthlessness, hypersomnia, fatigue, and a depressed mood more days than not for a period of at least two weeks.  Symptoms persist.     Client endorses the following anxiety symptoms: difficulty controlling worry; restlessness or feeling keyed up or on edge; being easily fatigued; difficulty concentrating or mind going blank; irritability; muscle tension; and sleep disturbance (difficulty falling or staying asleep, or restless unsatisfying sleep).  Symptoms persist.     The client meets criteria for Posttraumatic Stress Disorder, which includes dissociative reactions, persistent avoidance of distressing memories, negative alternations in cognitions and mood associated with the event, and marked alterations in arousal and reactivity associated with the event. The symptoms have been present for at least one month and cause clinically significant distress or impairment.      CSB:  Symptoms persist.    Alcohol Use Disorder:  Continues to break boundaries. Is triggering to his CSB.  Needs to work on sobriety.    Progress  Toward Treatment Goals:   The client reported difficulties with mood and struggling with avoidance.       Intervention: Modality and Response:  Utilized supportive and CBT techniques were used to address CSB and related mental health issues.      Is very dysregulated and not able to stay within any of his boundaries.  Had 3 drinks. Spending  spending money. Triggering for his CSB.  Taking his medications consistently - but probably still too inconsistency.    Work on staying within his alcohol boundaries.   Needs to connect with his sponsor.  Get back to meetings.        Assignment:  Stay within defined boundaries.    Interactive Complexity:  N/A      Diagnosis:  312.89 Other Specified Disruptive, Impulsive-Control, and Conduct Disorder (hypersexuality) (F91.8)  296.31 Major Depressive Disorder, mild, recurrent (F33.0)  300.02 Generalized Anxiety Disorder (F41.1)  309.81 Posttraumatic Stress Disorder (F43.10)  Alcohol use Disorder.      Plan / Need for Future Services:  Return for group therapy on weekly basis.  Continue seeing Dr.. Bravo 2X a month minimally     Sydney Will, PhD  Postdoctoral Fellow

## 2019-05-20 NOTE — PROGRESS NOTES
I was present for the entire group therapy session and actively participated in the session.  I agree with the assessment and plan as documented in this note.    Venita Lares, PhD, LP

## 2019-05-21 ENCOUNTER — OFFICE VISIT (OUTPATIENT)
Dept: OTHER | Facility: OUTPATIENT CENTER | Age: 39
End: 2019-05-21
Payer: COMMERCIAL

## 2019-05-21 DIAGNOSIS — F43.10 POSTTRAUMATIC STRESS DISORDER: ICD-10-CM

## 2019-05-21 DIAGNOSIS — F10.21 ALCOHOL USE DISORDER, MODERATE, IN EARLY REMISSION, DEPENDENCE (H): ICD-10-CM

## 2019-05-21 DIAGNOSIS — F41.1 GENERALIZED ANXIETY DISORDER: ICD-10-CM

## 2019-05-21 DIAGNOSIS — F91.8 OTHER CONDUCT DISORDERS: Primary | ICD-10-CM

## 2019-05-21 DIAGNOSIS — F33.0 MAJOR DEPRESSIVE DISORDER, RECURRENT EPISODE, MILD (H): ICD-10-CM

## 2019-05-21 NOTE — PROGRESS NOTES
[facility]  Case Progress Note    Date of Service: 5/21/19  Client Name: Shawn Camejo  YOB: 1980  MRN:  [id number]  Treating Provider: Lux Bravo, PhD Postdoctoral Fellow  Type of Session: Individual  Present in Session: Patient only  Number of Minutes:  53    Health Maintenance Summary - Mental Health Treatment Plan       Status Date      MENTAL HEALTH TX PLAN Next Due 8/28/2019      Done 9/28/2018         Current Symptoms/Status:  The client meets criteria for Other Specified Disruptive, Impulsive-Control, and Conduct Disorder (hypersexuality), which includes behavioral symptoms that cause clinically significant distress and impair social functioning. There appears to be evidence that client's behaviors are sexually compulsive in nature.     The client meets criteria for Major Depressive Disorder, Recurrent Episode, Mild, which includes experiencing anhedonia, worthlessness, hypersomnia, fatigue, and a depressed mood more days than not for a period of at least two weeks.    Client endorses the following anxiety symptoms: difficulty controlling worry; restlessness or feeling keyed up or on edge; being easily fatigued; difficulty concentrating or mind going blank; irritability; muscle tension; and sleep disturbance (difficulty falling or staying asleep, or restless unsatisfying sleep).    The client meets criteria for Posttraumatic Stress Disorder, which includes dissociative reactions, persistent avoidance of distressing memories, negative alternations in cognitions and mood associated with the event, and marked alterations in arousal and reactivity associated with the event. The symptoms have been present for at least one month and cause clinically significant distress or impairment.    The client meets criteria for Alcohol Use Disorder (Moderate), which includes consuming alcohol in larger amounts than intended, giving up social/recreational activities due to use,  "continued use despite negative consequences (eg, CSB), and unsuccessful efforts to cut down alcohol use.      Progress Toward Treatment Goals:   The client continues to show interest and commitment towards receiving services at [facility].  The client joined CSB group 4/4/19.      Intervention: Modality and Response:  Client fully oriented to person, place, and time. Therapist employed client-centered and CBT interventions to explore client's compulsive sexual behaviors and ongoing mental health concerns. Client reported difficulty ending his relationship with a stripper; continues to consume alcohol. Therapist employed direct interventions assessing readiness to attend AA and [facility] meetings more consistently. Therapist discussed the importance of being authentic and reaching out to support when he feels \"triggered\", as client reported not reaching out to others prior to a relapse. Client was open and receptive to feedback. He was encouraged to share topics discussed in today's session during his upcoming group session on Thursday.      Assignment:  -Stay connected with sponsor.  -Continue attending CSB group.      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     Lux Bravo, PhD  Postdoctoral Fellow  "

## 2019-05-23 ENCOUNTER — OFFICE VISIT (OUTPATIENT)
Dept: OTHER | Facility: OUTPATIENT CENTER | Age: 39
End: 2019-05-23
Payer: COMMERCIAL

## 2019-05-23 DIAGNOSIS — F91.8 OTHER CONDUCT DISORDERS: Primary | ICD-10-CM

## 2019-05-23 DIAGNOSIS — F10.21 ALCOHOL USE DISORDER, MODERATE, IN EARLY REMISSION, DEPENDENCE (H): ICD-10-CM

## 2019-05-23 NOTE — PROGRESS NOTES
[facility]   Group Progress Note    Date of Service: 5/23/19  Client Name: Shawn Camejo  YOB: 1980  MRN:  [id number]  Treating Provider: Venita Lares, PhD and Sydney Will, PhD  Type of Session: Group  Number of Minutes: 120    Health Maintenance Summary - Mental Health Treatment Plan       Status Date      MENTAL HEALTH TX PLAN Next Due 8/28/2019      Done 9/28/2018         Current Symptoms/Status:  The client meets criteria for Other Specified Disruptive, Impulsive-Control, and Conduct Disorder (hypersexuality), which includes behavioral symptoms that cause clinically significant distress and impair social functioning. He has continued to struggle staying within boundaries but he also is unsure of what boundaries to set for himself.     The client meets criteria for Major Depressive Disorder, Recurrent Episode, Mild, which includes experiencing anhedonia, worthlessness, hypersomnia, fatigue, and a depressed mood more days than not for a period of at least two weeks.  Symptoms persist.     Client endorses the following anxiety symptoms: difficulty controlling worry; restlessness or feeling keyed up or on edge; being easily fatigued; difficulty concentrating or mind going blank; irritability; muscle tension; and sleep disturbance (difficulty falling or staying asleep, or restless unsatisfying sleep).  Symptoms persist.     The client meets criteria for Posttraumatic Stress Disorder, which includes dissociative reactions, persistent avoidance of distressing memories, negative alternations in cognitions and mood associated with the event, and marked alterations in arousal and reactivity associated with the event. The symptoms have been present for at least one month and cause clinically significant distress or impairment.      CSB:  Symptoms persist.    Alcohol Use Disorder:  Stayed within boundaries this week.    Progress Toward Treatment Goals:   The client reported difficulties  with mood and struggling with avoidance.       Intervention: Modality and Response:  Utilized supportive and CBT techniques were used to address CSB and related mental health issues.      No drinks this week.  Continues to engage in compulsive sexual behavior.  However, he was feeling somewhat better as a result of staying within alcohol boundaries.      Encouraged him to stay within his alcohol boundaries.   Going to meetings.        Assignment:  Stay within defined boundaries.    Interactive Complexity:  N/A      Diagnosis:  312.89 Other Specified Disruptive, Impulsive-Control, and Conduct Disorder (hypersexuality) (F91.8)  296.31 Major Depressive Disorder, mild, recurrent (F33.0)  300.02 Generalized Anxiety Disorder (F41.1)  309.81 Posttraumatic Stress Disorder (F43.10)  Alcohol use Disorder.      Plan / Need for Future Services:  Return for group therapy on weekly basis.  Continue seeing Dr.. Bravo 2X a month minimally     Sydney Will, PhD  Postdoctoral Fellow

## 2019-05-29 ENCOUNTER — OFFICE VISIT (OUTPATIENT)
Dept: OTHER | Facility: OUTPATIENT CENTER | Age: 39
End: 2019-05-29
Payer: COMMERCIAL

## 2019-05-29 DIAGNOSIS — F43.10 POSTTRAUMATIC STRESS DISORDER: ICD-10-CM

## 2019-05-29 DIAGNOSIS — F41.1 GENERALIZED ANXIETY DISORDER: ICD-10-CM

## 2019-05-29 DIAGNOSIS — F33.0 MAJOR DEPRESSIVE DISORDER, RECURRENT EPISODE, MILD (H): ICD-10-CM

## 2019-05-29 DIAGNOSIS — F91.8 OTHER CONDUCT DISORDERS: Primary | ICD-10-CM

## 2019-05-29 DIAGNOSIS — F10.21 ALCOHOL USE DISORDER, MODERATE, IN EARLY REMISSION, DEPENDENCE (H): ICD-10-CM

## 2019-05-29 NOTE — PROGRESS NOTES
[facility] -  Case Progress Note    Date of Service: 5/29/19  Client Name: Shawn Camejo  YOB: 1980  MRN:  [id number]  Treating Provider: Lux Bravo, PhD Postdoctoral Fellow  Type of Session: Individual  Present in Session: Patient only  Number of Minutes:  53    Health Maintenance Summary - Mental Health Treatment Plan       Status Date      MENTAL HEALTH TX PLAN Next Due 8/28/2019      Done 9/28/2018         Current Symptoms/Status:  The client meets criteria for Other Specified Disruptive, Impulsive-Control, and Conduct Disorder (hypersexuality), which includes behavioral symptoms that cause clinically significant distress and impair social functioning. There appears to be evidence that client's behaviors are sexually compulsive in nature.     The client meets criteria for Major Depressive Disorder, Recurrent Episode, Mild, which includes experiencing anhedonia, worthlessness, hypersomnia, fatigue, and a depressed mood more days than not for a period of at least two weeks.    Client endorses the following anxiety symptoms: difficulty controlling worry; restlessness or feeling keyed up or on edge; being easily fatigued; difficulty concentrating or mind going blank; irritability; muscle tension; and sleep disturbance (difficulty falling or staying asleep, or restless unsatisfying sleep).    The client meets criteria for Posttraumatic Stress Disorder, which includes dissociative reactions, persistent avoidance of distressing memories, negative alternations in cognitions and mood associated with the event, and marked alterations in arousal and reactivity associated with the event. The symptoms have been present for at least one month and cause clinically significant distress or impairment.    The client meets criteria for Alcohol Use Disorder (Moderate), which includes consuming alcohol in larger amounts than intended, giving up social/recreational activities due to use,  continued use despite negative consequences (eg, CSB), and unsuccessful efforts to cut down alcohol use.      Progress Toward Treatment Goals:   The client continues to show interest and commitment towards receiving services at [facility].  The client joined CSB group 4/4/19.      Intervention: Modality and Response:  Client fully oriented to person, place, and time. Therapist employed client-centered and CBT interventions to explore client's compulsive sexual behaviors and ongoing mental health concerns. Client reported ongoing difficulty ending his relationship with a stripper; continues to consume alcohol. Therapist employed direct interventions assessing whether client feels inpatient or IOP programs would be better; cl denied these services and identified people pleasing tendencies as a barrier to recovery. He took notes throughout session and identified manageable steps to take after leaving session, such as attending an AA meeting, replacing pornography blocker on phone, and reaching out to sponsor. Client was open and receptive to feedback. He was encouraged to share topics discussed in today's session during his upcoming group session on Thursday.      Assignment:  -Stay connected with sponsor.  -Continue attending CSB group.      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     Lux Bravo, PhD  Postdoctoral Fellow

## 2019-05-30 ENCOUNTER — OFFICE VISIT (OUTPATIENT)
Dept: OTHER | Facility: OUTPATIENT CENTER | Age: 39
End: 2019-05-30
Payer: COMMERCIAL

## 2019-05-30 DIAGNOSIS — F10.21 ALCOHOL USE DISORDER, MODERATE, IN EARLY REMISSION, DEPENDENCE (H): ICD-10-CM

## 2019-05-30 DIAGNOSIS — F91.8 OTHER CONDUCT DISORDERS: Primary | ICD-10-CM

## 2019-05-30 NOTE — PROGRESS NOTES
[facility]   Group Progress Note    Date of Service: 5/30/19  Client Name: Shawn Camejo  YOB: 1980  MRN:  [id number]  Treating Provider: Venita Lares, PhD and Sydney Will, PhD  Type of Session: Group  Number of Minutes: 120    Health Maintenance Summary - Mental Health Treatment Plan       Status Date      MENTAL HEALTH TX PLAN Next Due 8/28/2019      Done 9/28/2018         Current Symptoms/Status:  The client meets criteria for Other Specified Disruptive, Impulsive-Control, and Conduct Disorder (hypersexuality), which includes behavioral symptoms that cause clinically significant distress and impair social functioning. He has continued to struggle staying within boundaries but he also is unsure of what boundaries to set for himself.     The client meets criteria for Major Depressive Disorder, Recurrent Episode, Mild, which includes experiencing anhedonia, worthlessness, hypersomnia, fatigue, and a depressed mood more days than not for a period of at least two weeks.  Symptoms persist.     Client endorses the following anxiety symptoms: difficulty controlling worry; restlessness or feeling keyed up or on edge; being easily fatigued; difficulty concentrating or mind going blank; irritability; muscle tension; and sleep disturbance (difficulty falling or staying asleep, or restless unsatisfying sleep).  Symptoms persist.     The client meets criteria for Posttraumatic Stress Disorder, which includes dissociative reactions, persistent avoidance of distressing memories, negative alternations in cognitions and mood associated with the event, and marked alterations in arousal and reactivity associated with the event. The symptoms have been present for at least one month and cause clinically significant distress or impairment.      CSB:  Symptoms persist.    Alcohol Use Disorder:  Drank several times this week.    Progress Toward Treatment Goals:   The client reported difficulties with  mood and struggling with avoidance.       Intervention: Modality and Response:  Utilized supportive and CBT techniques were used to address CSB and related mental health issues.      Drinking this week.  Feels really out of control.   Continues to engage in compulsive sexual behavior.  Encouraged him to set some more realistic boundaries for himself.     Encouraged him to stay within his alcohol boundaries.   Going to meetings.  Consider higher level of treatment.      Assignment:  Stay within defined boundaries.    Interactive Complexity:  N/A      Diagnosis:  312.89 Other Specified Disruptive, Impulsive-Control, and Conduct Disorder (hypersexuality) (F91.8)  296.31 Major Depressive Disorder, mild, recurrent (F33.0)  300.02 Generalized Anxiety Disorder (F41.1)  309.81 Posttraumatic Stress Disorder (F43.10)  Alcohol use Disorder.      Plan / Need for Future Services:  Return for group therapy on weekly basis.  Continue seeing Dr.. Bravo 2X a month jeremy Lares, PhD

## 2019-06-06 ENCOUNTER — OFFICE VISIT (OUTPATIENT)
Dept: OTHER | Facility: OUTPATIENT CENTER | Age: 39
End: 2019-06-06
Payer: COMMERCIAL

## 2019-06-06 DIAGNOSIS — F10.21 ALCOHOL USE DISORDER, MODERATE, IN EARLY REMISSION, DEPENDENCE (H): ICD-10-CM

## 2019-06-06 DIAGNOSIS — F33.0 MAJOR DEPRESSIVE DISORDER, RECURRENT EPISODE, MILD (H): ICD-10-CM

## 2019-06-06 DIAGNOSIS — F91.8 OTHER CONDUCT DISORDERS: Primary | ICD-10-CM

## 2019-06-06 DIAGNOSIS — F41.1 GENERALIZED ANXIETY DISORDER: ICD-10-CM

## 2019-06-06 DIAGNOSIS — F43.10 POSTTRAUMATIC STRESS DISORDER: ICD-10-CM

## 2019-06-06 NOTE — PROGRESS NOTES
[facility]   Group Progress Note    Date of Service: 6/06/19  Client Name: Shawn Camejo  YOB: 1980  MRN:  [id number]  Treating Provider: Venita Lares, PhD    Type of Session: Group  Number of Minutes: 120    Health Maintenance Summary - Mental Health Treatment Plan       Status Date      MENTAL HEALTH [place] PLAN Next Due 8/28/2019      Done 9/28/2018         Current Symptoms/Status:  The client meets criteria for Other Specified Disruptive, Impulsive-Control, and Conduct Disorder (hypersexuality), which includes behavioral symptoms that cause clinically significant distress and impair social functioning. He has continued to struggle staying within boundaries but he also is unsure of what boundaries to set for himself.     The client meets criteria for Major Depressive Disorder, Recurrent Episode, Mild, which includes experiencing anhedonia, worthlessness, hypersomnia, fatigue, and a depressed mood more days than not for a period of at least two weeks.  Symptoms persist.     Client endorses the following anxiety symptoms: difficulty controlling worry; restlessness or feeling keyed up or on edge; being easily fatigued; difficulty concentrating or mind going blank; irritability; muscle tension; and sleep disturbance (difficulty falling or staying asleep, or restless unsatisfying sleep).  Symptoms persist.     The client meets criteria for Posttraumatic Stress Disorder, which includes dissociative reactions, persistent avoidance of distressing memories, negative alternations in cognitions and mood associated with the event, and marked alterations in arousal and reactivity associated with the event. The symptoms have been present for at least one month and cause clinically significant distress or impairment.      CSB:  Symptoms persist.    Alcohol Use Disorder:  Drank several times this week.    Progress Toward Treatment Goals:   The client reported difficulties with mood and struggling with  avoidance.       Intervention: Modality and Response:  Utilized supportive and CBT techniques were used to address CSB and related mental health issues.      Drinking this week.  Still feeling very out of control.  He made plan to address his out of control issues.  Will call sponsor and get back into groups - and work on sobreity.    Challenged him about his need to think about a higher level of care to get him under control.         Assignment:  Stay within defined boundaries.    Interactive Complexity:  Communication difficulties present during current the procedure included the need to manage maladaptive communication related to high anxiety, high reactivity, repeated questions, and resistance that complicated delivery of care.      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      Plan / Need for Future Services:  Return for group therapy on weekly basis.  Continue seeing Dr.. Bravo 2X a month jeremy Lares, PhD

## 2019-06-11 ENCOUNTER — OFFICE VISIT (OUTPATIENT)
Dept: OTHER | Facility: OUTPATIENT CENTER | Age: 39
End: 2019-06-11
Payer: COMMERCIAL

## 2019-06-11 DIAGNOSIS — F33.0 MAJOR DEPRESSIVE DISORDER, RECURRENT EPISODE, MILD (H): ICD-10-CM

## 2019-06-11 DIAGNOSIS — F10.21 ALCOHOL USE DISORDER, MODERATE, IN EARLY REMISSION, DEPENDENCE (H): ICD-10-CM

## 2019-06-11 DIAGNOSIS — F41.1 GENERALIZED ANXIETY DISORDER: ICD-10-CM

## 2019-06-11 DIAGNOSIS — F43.10 POSTTRAUMATIC STRESS DISORDER: ICD-10-CM

## 2019-06-11 DIAGNOSIS — F91.8 OTHER CONDUCT DISORDERS: Primary | ICD-10-CM

## 2019-06-11 NOTE — PROGRESS NOTES
[facility]  [facility] Progress Note    Date of Service: 6/11/19  Client Name: Shawn Camejo  YOB: 1980  MRN:  [id number]  Treating Provider: Lux Bravo, PhD Postdoctoral Fellow  Type of Session: Individual  Present in Session: Patient only  Number of Minutes:  53    Health Maintenance Summary - Mental Health Treatment Plan       Status Date      MENTAL HEALTH TX PLAN Next Due 8/28/2019      Done 9/28/2018         Current Symptoms/Status:  The client meets criteria for Other Specified Disruptive, Impulsive-Control, and Conduct Disorder (hypersexuality), which includes behavioral symptoms that cause clinically significant distress and impair social functioning. There appears to be evidence that client's behaviors are sexually compulsive in nature.     The client meets criteria for Major Depressive Disorder, Recurrent Episode, Mild, which includes experiencing anhedonia, worthlessness, hypersomnia, fatigue, and a depressed mood more days than not for a period of at least two weeks.    Client endorses the following anxiety symptoms: difficulty controlling worry; restlessness or feeling keyed up or on edge; being easily fatigued; difficulty concentrating or mind going blank; irritability; muscle tension; and sleep disturbance (difficulty falling or staying asleep, or restless unsatisfying sleep).    The client meets criteria for Posttraumatic Stress Disorder, which includes dissociative reactions, persistent avoidance of distressing memories, negative alternations in cognitions and mood associated with the event, and marked alterations in arousal and reactivity associated with the event. The symptoms have been present for at least one month and cause clinically significant distress or impairment.    The client meets criteria for Alcohol Use Disorder (Moderate), which includes consuming alcohol in larger amounts than intended, giving up social/recreational activities due to use,  continued use despite negative consequences (eg, CSB), and unsuccessful efforts to cut down alcohol use.      Progress Toward Treatment Goals:   The client continues to show interest and commitment towards receiving services at [facility].  The client joined CSB group 4/4/19.      Intervention: Modality and Response:  Client fully oriented to person, place, and time. Therapist employed client-centered and CBT interventions to explore client's compulsive sexual behaviors and ongoing mental health concerns. Client reported ongoing difficulty ending his relationship with a stripper; continues to consume alcohol and visit casinos. Therapist followed up on group interventions assessing readiness to make changes in surrounding environment. Therapist employed CBT techniques on the whiteboard to illustrate how cl's current living situation affects relapses. Cl was engaged with interventions and receptive to feedback. Therapist provided cl list of [facility] IOP groups and encouraged cl to schedule an intake for additional support.      Assignment:  -Stay connected with sponsor.  -Continue attending CSB group.  -Followup with [facility] referral.      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     Lux Bravo, PhD  Postdoctoral Fellow

## 2019-06-13 ENCOUNTER — OFFICE VISIT (OUTPATIENT)
Dept: OTHER | Facility: OUTPATIENT CENTER | Age: 39
End: 2019-06-13
Payer: COMMERCIAL

## 2019-06-13 DIAGNOSIS — F10.21 ALCOHOL USE DISORDER, MODERATE, IN EARLY REMISSION, DEPENDENCE (H): ICD-10-CM

## 2019-06-13 DIAGNOSIS — F91.8 OTHER CONDUCT DISORDERS: Primary | ICD-10-CM

## 2019-06-13 DIAGNOSIS — F43.10 POSTTRAUMATIC STRESS DISORDER: ICD-10-CM

## 2019-06-13 DIAGNOSIS — F41.1 GENERALIZED ANXIETY DISORDER: ICD-10-CM

## 2019-06-13 NOTE — PROGRESS NOTES
[facility]   Group Progress Note    Date of Service: 6/13/19  Client Name: Shawn Camejo  YOB: 1980  MRN:  [id number]  Treating Provider: Venita Lares, PhD    Type of Session: Group  Number of Minutes: 120    Health Maintenance Summary - Mental Health Treatment Plan       Status Date      MENTAL HEALTH TX PLAN Next Due 8/28/2019      Done 9/28/2018         Current Symptoms/Status:  The client meets criteria for Other Specified Disruptive, Impulsive-Control, and Conduct Disorder (hypersexuality), which includes behavioral symptoms that cause clinically significant distress and impair social functioning. He has continued to struggle staying within boundaries but he also is unsure of what boundaries to set for himself.     The client meets criteria for Major Depressive Disorder, Recurrent Episode, Mild, which includes experiencing anhedonia, worthlessness, hypersomnia, fatigue, and a depressed mood more days than not for a period of at least two weeks.  Symptoms persist.     Client endorses the following anxiety symptoms: difficulty controlling worry; restlessness or feeling keyed up or on edge; being easily fatigued; difficulty concentrating or mind going blank; irritability; muscle tension; and sleep disturbance (difficulty falling or staying asleep, or restless unsatisfying sleep).  Symptoms persist.     The client meets criteria for Posttraumatic Stress Disorder, which includes dissociative reactions, persistent avoidance of distressing memories, negative alternations in cognitions and mood associated with the event, and marked alterations in arousal and reactivity associated with the event. The symptoms have been present for at least one month and cause clinically significant distress or impairment.      CSB:  Symptoms persist.    Alcohol Use Disorder:  Did not drink this week.    Progress Toward Treatment Goals:   The client reported difficulties with mood and struggling with  avoidance.       Intervention: Modality and Response:  Utilized supportive and CBT techniques were used to address CSB and related mental health issues.      Did not drink.  Still seeing stripper.  Did not follow through on assignemnt to go to 12 step groups /contact sponsor.  Challenged him about his need to think about a higher level of care to get him under control.         Assignment:  Stay within defined boundaries.    Interactive Complexity:  Communication difficulties present during current the procedure included the need to manage maladaptive communication related to high anxiety, high reactivity, repeated questions, and resistance that complicated delivery of care.      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      Plan / Need for Future Services:  Return for group therapy on weekly basis.  Continue seeing Dr.. Bravo 2X a month jeremy Lares, PhD

## 2019-06-18 ENCOUNTER — OFFICE VISIT (OUTPATIENT)
Dept: OTHER | Facility: OUTPATIENT CENTER | Age: 39
End: 2019-06-18
Payer: COMMERCIAL

## 2019-06-18 DIAGNOSIS — F10.21 ALCOHOL USE DISORDER, MODERATE, IN EARLY REMISSION, DEPENDENCE (H): ICD-10-CM

## 2019-06-18 DIAGNOSIS — F43.10 POSTTRAUMATIC STRESS DISORDER: ICD-10-CM

## 2019-06-18 DIAGNOSIS — F41.1 GENERALIZED ANXIETY DISORDER: ICD-10-CM

## 2019-06-18 DIAGNOSIS — F33.0 MAJOR DEPRESSIVE DISORDER, RECURRENT EPISODE, MILD (H): ICD-10-CM

## 2019-06-18 DIAGNOSIS — F91.8 OTHER CONDUCT DISORDERS: Primary | ICD-10-CM

## 2019-06-18 NOTE — PROGRESS NOTES
[facility] -  Case Progress Note    Date of Service: 6/18/19  Client Name: Shawn Camejo  YOB: 1980  MRN:  [id number]  Treating Provider: Lux Bravo, PhD Postdoctoral Fellow  Type of Session: Individual  Present in Session: Patient only  Number of Minutes:  53    Health Maintenance Summary - Mental Health Treatment Plan       Status Date      MENTAL HEALTH TX PLAN Next Due 8/28/2019      Done 9/28/2018         Current Symptoms/Status:  The client meets criteria for Other Specified Disruptive, Impulsive-Control, and Conduct Disorder (hypersexuality), which includes behavioral symptoms that cause clinically significant distress and impair social functioning. There appears to be evidence that client's behaviors are sexually compulsive in nature.     The client meets criteria for Major Depressive Disorder, Recurrent Episode, Mild, which includes experiencing anhedonia, worthlessness, hypersomnia, fatigue, and a depressed mood more days than not for a period of at least two weeks.    Client endorses the following anxiety symptoms: difficulty controlling worry; restlessness or feeling keyed up or on edge; being easily fatigued; difficulty concentrating or mind going blank; irritability; muscle tension; and sleep disturbance (difficulty falling or staying asleep, or restless unsatisfying sleep).    The client meets criteria for Posttraumatic Stress Disorder, which includes dissociative reactions, persistent avoidance of distressing memories, negative alternations in cognitions and mood associated with the event, and marked alterations in arousal and reactivity associated with the event. The symptoms have been present for at least one month and cause clinically significant distress or impairment.    The client meets criteria for Alcohol Use Disorder (Moderate), which includes consuming alcohol in larger amounts than intended, giving up social/recreational activities due to use,  "continued use despite negative consequences (eg, CSB), and unsuccessful efforts to cut down alcohol use.      Progress Toward Treatment Goals:   The client continues to show interest and commitment towards receiving services at [facility].  The client joined CSB group 4/4/19.      Intervention: Modality and Response:  Client fully oriented to person, place, and time. Therapist employed client-centered and CBT interventions to explore client's compulsive sexual behaviors and ongoing mental health concerns. Client reported ongoing difficulty ending his relationship with a stripper; continues to visit casinos. Reported visiting a prostitute that was using drugs two days ago. Shared feeling \"overwhelmed\" when we identified thought-feeling-behavior cycle; he identified avoidance as his coping mechanism for feeling overwhelmed. Therapist continued to encourage cl to utilize sponsor, 12-step meetings, CSB group, and IOP referral. Cl reported intention to follow up with IOP referral later today, and agreed to check in with CSB group about progress on Thursday.      Assignment:  -Stay connected with sponsor.  -Continue attending CSB group.  -[facility]      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     Lux Bravo, PhD  Postdoctoral Fellow  "

## 2019-06-25 ENCOUNTER — OFFICE VISIT (OUTPATIENT)
Dept: OTHER | Facility: OUTPATIENT CENTER | Age: 39
End: 2019-06-25
Payer: COMMERCIAL

## 2019-06-25 DIAGNOSIS — F91.8 OTHER CONDUCT DISORDERS: Primary | ICD-10-CM

## 2019-06-25 DIAGNOSIS — F10.21 ALCOHOL USE DISORDER, MODERATE, IN EARLY REMISSION, DEPENDENCE (H): ICD-10-CM

## 2019-06-25 DIAGNOSIS — F43.10 POSTTRAUMATIC STRESS DISORDER: ICD-10-CM

## 2019-06-25 DIAGNOSIS — F41.1 GENERALIZED ANXIETY DISORDER: ICD-10-CM

## 2019-06-25 DIAGNOSIS — F33.0 MAJOR DEPRESSIVE DISORDER, RECURRENT EPISODE, MILD (H): ICD-10-CM

## 2019-06-25 NOTE — PROGRESS NOTES
[facility]  Case Progress Note    Date of Service: 6/25/19  Client Name: Shawn Camejo  YOB: 1980  MRN:  [id number]  Treating Provider: Lux Bravo, PhD Postdoctoral Fellow  Type of Session: Individual  Present in Session: Patient only  Number of Minutes:  53    Health Maintenance Summary - Mental Health Treatment Plan       Status Date      MENTAL HEALTH TX PLAN Next Due 8/28/2019      Done 9/28/2018         Current Symptoms/Status:  The client meets criteria for Other Specified Disruptive, Impulsive-Control, and Conduct Disorder (hypersexuality), which includes behavioral symptoms that cause clinically significant distress and impair social functioning. There appears to be evidence that client's behaviors are sexually compulsive in nature.     The client meets criteria for Major Depressive Disorder, Recurrent Episode, Mild, which includes experiencing anhedonia, worthlessness, hypersomnia, fatigue, and a depressed mood more days than not for a period of at least two weeks.    Client endorses the following anxiety symptoms: difficulty controlling worry; restlessness or feeling keyed up or on edge; being easily fatigued; difficulty concentrating or mind going blank; irritability; muscle tension; and sleep disturbance (difficulty falling or staying asleep, or restless unsatisfying sleep).    The client meets criteria for Posttraumatic Stress Disorder, which includes dissociative reactions, persistent avoidance of distressing memories, negative alternations in cognitions and mood associated with the event, and marked alterations in arousal and reactivity associated with the event. The symptoms have been present for at least one month and cause clinically significant distress or impairment.    The client meets criteria for Alcohol Use Disorder (Moderate), which includes consuming alcohol in larger amounts than intended, giving up social/recreational activities due to use,  continued use despite negative consequences (eg, CSB), and unsuccessful efforts to cut down alcohol use.      Progress Toward Treatment Goals:   The client continues to show interest and commitment towards receiving services at [facility].  The client joined CSB group 4/4/19.      Intervention: Modality and Response:  Client fully oriented to person, place, and time. Therapist employed client-centered and CBT interventions to explore client's compulsive sexual behaviors and ongoing mental health concerns. Client reported setting a boundary with unhealthy relationship by turning town sex. Client was more insightful this session about his difficulty coping with negative emotions, and related this pattern of behavior to his divorce about 10 years ago. Shared feeling disconnected from family and not having emergency contacts. Therapist helped cl identify five short-term goals over the next week to work toward longer-term goal of building authentic relationships with others: 1) Attend [facility], 2) Attend CSB group Thurs, 3) feel emotions about ending relationship, and end the relationship, 4) get social support, and 5) engage in self-care (exercise, groceries). Cl was engaged with interventions and reported gaining insight.      Assignment:  -Stay connected with sponsor.  -Continue attending CSB group.  -Followup with [facility] referral.      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     Lux Bravo, PhD  Postdoctoral Fellow

## 2019-06-27 ENCOUNTER — OFFICE VISIT (OUTPATIENT)
Dept: OTHER | Facility: OUTPATIENT CENTER | Age: 39
End: 2019-06-27
Payer: COMMERCIAL

## 2019-06-27 DIAGNOSIS — F43.10 POSTTRAUMATIC STRESS DISORDER: ICD-10-CM

## 2019-06-27 DIAGNOSIS — F91.8 OTHER CONDUCT DISORDERS: Primary | ICD-10-CM

## 2019-06-27 DIAGNOSIS — F33.0 MAJOR DEPRESSIVE DISORDER, RECURRENT EPISODE, MILD (H): ICD-10-CM

## 2019-06-27 DIAGNOSIS — F41.1 GENERALIZED ANXIETY DISORDER: ICD-10-CM

## 2019-06-30 NOTE — PROGRESS NOTES
[facility]   Group Progress Note    Date of Service: 6/27/19  Client Name: Shawn Camejo  YOB: 1980  MRN:  [id number]  Treating Provider: Venita Lares, PhD    Type of Session: Group  Number of Minutes: 120    Health Maintenance Summary - Mental Health Treatment Plan       Status Date      MENTAL HEALTH [place] PLAN Next Due 8/28/2019      Done 9/28/2018         Current Symptoms/Status:  The client meets criteria for Other Specified Disruptive, Impulsive-Control, and Conduct Disorder (hypersexuality), which includes behavioral symptoms that cause clinically significant distress and impair social functioning. He has continued to struggle staying within boundaries but he also is unsure of what boundaries to set for himself.     The client meets criteria for Major Depressive Disorder, Recurrent Episode, Mild, which includes experiencing anhedonia, worthlessness, hypersomnia, fatigue, and a depressed mood more days than not for a period of at least two weeks.  Symptoms persist.     Client endorses the following anxiety symptoms: difficulty controlling worry; restlessness or feeling keyed up or on edge; being easily fatigued; difficulty concentrating or mind going blank; irritability; muscle tension; and sleep disturbance (difficulty falling or staying asleep, or restless unsatisfying sleep).  Symptoms persist.     The client meets criteria for Posttraumatic Stress Disorder, which includes dissociative reactions, persistent avoidance of distressing memories, negative alternations in cognitions and mood associated with the event, and marked alterations in arousal and reactivity associated with the event. The symptoms have been present for at least one month and cause clinically significant distress or impairment.  Symptoms persist.    CSB:  Symptoms persist.    Alcohol Use Disorder:  Did not drink this week.  Making some progress.    Progress Toward Treatment Goals:   The client reported  difficulties with mood and struggling with avoidance.       Intervention: Modality and Response:  Utilized supportive and CBT techniques were used to address CSB and related mental health issues.      This was a group process session with two patients graduating.  He gave and received very good feedback and is inspired by making more progress for himself.      Assignment:  Stay within defined boundaries.    Interactive Complexity:  Communication difficulties present during current the procedure included the need to manage maladaptive communication related to high anxiety, high reactivity, repeated questions, and resistance that complicated delivery of care.      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      Plan / Need for Future Services:  Return for group therapy on weekly basis.  Continue seeing Dr.. Bravo 2X a month jeremy Lares, PhD

## 2019-07-09 ENCOUNTER — OFFICE VISIT (OUTPATIENT)
Dept: OTHER | Facility: OUTPATIENT CENTER | Age: 39
End: 2019-07-09
Payer: COMMERCIAL

## 2019-07-09 DIAGNOSIS — F10.21 ALCOHOL USE DISORDER, MODERATE, IN EARLY REMISSION, DEPENDENCE (H): ICD-10-CM

## 2019-07-09 DIAGNOSIS — F41.1 GENERALIZED ANXIETY DISORDER: ICD-10-CM

## 2019-07-09 DIAGNOSIS — F91.8 OTHER CONDUCT DISORDERS: Primary | ICD-10-CM

## 2019-07-09 DIAGNOSIS — F33.0 MAJOR DEPRESSIVE DISORDER, RECURRENT EPISODE, MILD (H): ICD-10-CM

## 2019-07-09 DIAGNOSIS — F43.10 POSTTRAUMATIC STRESS DISORDER: ICD-10-CM

## 2019-07-09 NOTE — PROGRESS NOTES
[facility] -  Case Progress Note    Date of Service: 7/09/19  Client Name: Shawn Camejo  YOB: 1980  MRN:  [id number]  Treating Provider: Lux Bravo, PhD Postdoctoral Fellow  Type of Session: Individual  Present in Session: Patient only  Number of Minutes:  53    Health Maintenance Summary - Mental Health Treatment Plan       Status Date      MENTAL HEALTH TX PLAN Next Due 8/28/2019      Done 9/28/2018         Current Symptoms/Status:  The client meets criteria for Other Specified Disruptive, Impulsive-Control, and Conduct Disorder (hypersexuality), which includes behavioral symptoms that cause clinically significant distress and impair social functioning. There appears to be evidence that client's behaviors are sexually compulsive in nature.     The client meets criteria for Major Depressive Disorder, Recurrent Episode, Mild, which includes experiencing anhedonia, worthlessness, hypersomnia, fatigue, and a depressed mood more days than not for a period of at least two weeks.    Client endorses the following anxiety symptoms: difficulty controlling worry; restlessness or feeling keyed up or on edge; being easily fatigued; difficulty concentrating or mind going blank; irritability; muscle tension; and sleep disturbance (difficulty falling or staying asleep, or restless unsatisfying sleep).    The client meets criteria for Posttraumatic Stress Disorder, which includes dissociative reactions, persistent avoidance of distressing memories, negative alternations in cognitions and mood associated with the event, and marked alterations in arousal and reactivity associated with the event. The symptoms have been present for at least one month and cause clinically significant distress or impairment.    The client meets criteria for Alcohol Use Disorder (Moderate), which includes consuming alcohol in larger amounts than intended, giving up social/recreational activities due to use,  continued use despite negative consequences (eg, CSB), and unsuccessful efforts to cut down alcohol use.      Progress Toward Treatment Goals:   The client continues to show interest and commitment towards receiving services at [facility] ([place]).  The client joined [facility] group 4/4/19.      Intervention: Modality and Response:  Client fully oriented to person, place, and time. Therapist employed client-centered and CBT interventions to explore client's compulsive sexual behaviors and ongoing mental health concerns. Client reported not following through with previous session's goals, and reported violating boundaries by continuing a relationship with a stripper, consuming alcohol, gambling, and smoking marijuana. He reported hopelessness for his recovery, and interventions were aimed at targeting this emotion. Therapist re-printed off referral to [facility], which client reported he lost. Client agreed to call them immediately after this session, and agreed to restart [facility] meetings. He plans to followup with [facility] group on Thursday.      Assignment:  -Stay connected with sponsor.  -Continue attending CSB group.  -Followup with [facility] referral.      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     Lux Bravo, PhD  Postdoctoral Fellow

## 2019-07-16 ENCOUNTER — HOSPITAL ENCOUNTER (OUTPATIENT)
Dept: BEHAVIORAL HEALTH | Facility: CLINIC | Age: 39
End: 2019-07-16
Attending: SOCIAL WORKER
Payer: COMMERCIAL

## 2019-07-16 ENCOUNTER — OFFICE VISIT (OUTPATIENT)
Dept: OTHER | Facility: OUTPATIENT CENTER | Age: 39
End: 2019-07-16
Payer: COMMERCIAL

## 2019-07-16 VITALS
HEART RATE: 66 BPM | DIASTOLIC BLOOD PRESSURE: 77 MMHG | SYSTOLIC BLOOD PRESSURE: 122 MMHG | WEIGHT: 212 LBS | BODY MASS INDEX: 30.35 KG/M2 | HEIGHT: 70 IN

## 2019-07-16 DIAGNOSIS — F41.1 GENERALIZED ANXIETY DISORDER: ICD-10-CM

## 2019-07-16 DIAGNOSIS — F91.8 OTHER CONDUCT DISORDERS: Primary | ICD-10-CM

## 2019-07-16 DIAGNOSIS — F43.10 POSTTRAUMATIC STRESS DISORDER: ICD-10-CM

## 2019-07-16 DIAGNOSIS — F33.0 MAJOR DEPRESSIVE DISORDER, RECURRENT EPISODE, MILD (H): ICD-10-CM

## 2019-07-16 DIAGNOSIS — F10.21 ALCOHOL USE DISORDER, MODERATE, IN EARLY REMISSION, DEPENDENCE (H): ICD-10-CM

## 2019-07-16 PROCEDURE — H0001 ALCOHOL AND/OR DRUG ASSESS: HCPCS

## 2019-07-16 ASSESSMENT — PATIENT HEALTH QUESTIONNAIRE - PHQ9: SUM OF ALL RESPONSES TO PHQ QUESTIONS 1-9: 17

## 2019-07-16 ASSESSMENT — PAIN SCALES - GENERAL: PAINLEVEL: MILD PAIN (2)

## 2019-07-16 ASSESSMENT — MIFFLIN-ST. JEOR: SCORE: 1877.88

## 2019-07-16 ASSESSMENT — ANXIETY QUESTIONNAIRES
3. WORRYING TOO MUCH ABOUT DIFFERENT THINGS: SEVERAL DAYS
6. BECOMING EASILY ANNOYED OR IRRITABLE: SEVERAL DAYS
1. FEELING NERVOUS, ANXIOUS, OR ON EDGE: MORE THAN HALF THE DAYS
GAD7 TOTAL SCORE: 9
2. NOT BEING ABLE TO STOP OR CONTROL WORRYING: MORE THAN HALF THE DAYS
4. TROUBLE RELAXING: SEVERAL DAYS
7. FEELING AFRAID AS IF SOMETHING AWFUL MIGHT HAPPEN: MORE THAN HALF THE DAYS
5. BEING SO RESTLESS THAT IT IS HARD TO SIT STILL: NOT AT ALL

## 2019-07-16 NOTE — PROGRESS NOTES
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 alcohol and gambling. Like my therapist said you need to be more structure. You need to be doing more recovery stuff. I get overwhelmed to.\"  Sponsor: \"yeah, but I haven't talked with him in 3 months. Ever since I started dating, I haven't... That was something my therapist brought up. If you are in a relationship and it cuts you off from other relationships. It is not a healthy relationship. If it was a healthy relationship, you would want to introduce her to your friends. You'd be excited.\"    5. What would assist you in staying sober/straight?     \"just consistency.\"  How are you going to get that: \"I don't know.\"    Dimension V Ratings   Relapse/Continued Use/Continued problem potential - The placing authority must use the criteria in Dimension V to determine a client s relapse, continued use, and continued problem potential.   RISK DESCRIPTIONS - Severity rating: 3 Client has poor recognition and understanding of relapse and recidivism issues and displays moderately high vulnerability for further substance use or mental health problems. Client has few coping skills and rarely applies coping skills.    REASONS SEVERITY WAS ASSIGNED - (What information did the person provide that indicates his or her understanding of " "relapse issues? What about the person s experience indicates how prone he or she is to relapse? What coping skills does the person have that decrease relapse potential?)      Patient has attended 4 CD treatments in his life time.  Pt has actively attended AA meetings, but stopped attending once he started going to the [facility]. He was working with a sponsor, but stopped meeting with his sponsor once he started dating his girlfriend. Pt reported having 3 monts of being sober in his life time.  He reports he relapses after 3 months because \"I guess the peer pressure and the wanting to fit in with other people.\" In the past 30 days, pt rates his cravings for alcohol as a 5 on the 0-10 Craving Scale. Pt is at a moderately high risk for continued use.       DIMENSION VI - Recovery Environment   1. Are you employed/attending school? Tell me about that.     He works second shift (3:30pm-midnight) Friday through Tuesday as a . He has Wednesday and Thursday off.    2A. Describe a typical day; evening for you. Work, school, social, leisure, volunteer, spiritual practices. Include time spent obtaining, using, recovering from drugs or alcohol. (DSM)     \"I go to work, and then I compulsively do stuff on the internet. And then, I sleep. That is about it. I will occassionally go see, and then I will go to my appointments.\"    Please describe what leisure activities have been associated with your substance abuse:     Going to the bar or strip club    2B. How often do you spend more time than you planned using or use more than you planned? (DSM)     In the past year: \"once a month.\"    3. How important is using to your social connections? Do many of your family or friends use?     \"not really. I don't really have friends.\"    4A. Are you currently in a significant relationship?     Yes.  4B. How long? 3 months            Please describe your significant other's use of mood altering chemicals? She drinks heavily.    4C. Sexual " "Orientation:     Heterosexual    5A. Who do you live with?      He lives in a sober house with one other person.    5B. Tell me about their alcohol/drug use and mental health issues.     He's using drugs. They stay out of each other's way. \"I don't know how to deal with that dynamic. I want to get out of there.\"    5C. Are you concerned for your safety there? No    5D. Are you concerned about the safety of anyone else who lives with you? No    6A. Do you have children who live with you?     No    6B. Do you have children who do not live with you?     The patient denied having any children.    7A. Who supports you in making changes in your alcohol or drug use? What are they willing to do to support you? Who is upset or angry about you making changes in your alcohol or drug use? How big a problem is this for you?      \"I am pretty independent. I am very good at trying to control. I am good at image control.\"    7B. This table is provided to record information about the person s relationships and available support It is not necessary to ask each item; only to get a comprehensive picture of their support system.  How often can you count on the following people when you need someone?   Partner / Spouse Usually supportive   Parent(s)/Aunt(s)/Uncle(s)/Grandparents The patient doesn't have any current contact with parents or other family members.   Sibling(s)/Cousin(s) The patient doesn't have any current contact with siblings.   Child(andrew) The patient doesn't have any children.   Other relative(s) The patient doesn't have any other relatives.   Friend(s)/neighbor(s) Usually supportive   Child(andrew) s father(s)/mother(s) The patient doesn't have any children.   Support group member(s) Always supportive   Community of reese members The patient denied having any current involvement with community reese members.   /counselor/therapist/healer Always supportive   Other (specify) No     8A. What is your current living " "situation?     The patient currently lives in a sober house with one other person. He stated, \"I don't think anyone in our house is sober. We don't have any management. It is really unhealthy.\"     8B. What is your long term plan for where you will be living?     \"I would like to move within the next two months. I have been debating getting my own apartment vs a sober house. Trying to figure which out be the healthier choice.\"    8C. Tell me about your living environment/neighborhood? Ask enough follow up questions to determine safety, criminal activity, availability of alcohol and drugs, supportive or antagonistic to the person making changes.      See above    9. Criminal justice history: Gather current/recent history and any significant history related to substance use--Arrests? Convictions? Circumstances? Alcohol or drug involvement? Sentences? Still on probation or parole? Expectations of the court? Current court order? Any sex offenses - lifetime? What level? (DSM)    \"I had a restraining order when I went through my divorce.\"    10. What obstacles exist to participating in treatment? (Time off work, childcare, funding, transportation, pending halfway time, living situation)     Work schedule    Dimension VI Ratings   Recovery environment - The placing authority must use the criteria in Dimension VI to determine a client s recovery environment.   RISK DESCRIPTIONS - Severity ratin Client is engaged in structured, meaningful activity, but peers, family, significant other, and living environment are unsupportive, or there is criminal justice involvement by the client or among the client's peers, significant others, or in the client's living environment.    REASONS SEVERITY WAS ASSIGNED - (What support does the person have for making changes? What structure/stability does the person have in his or her daily life that will increase the likelihood that changes can be sustained? What problems exist in the person s " "environment that will jeopardize getting/staying clean and sober?)     The patient currently lives in a sober house with one other person. He stated, \"I don't think anyone in our house is sober. We don't have any management. It is really unhealthy.\"  He reports he has kept his drinking a secret and no one really knows about it.  The patient identified as being heterosexual and he reported being with his girlfriend for the past 3 months. She drinks pretty heavily. The patient reported having a history of legal issues, including \"I had a restraining order when I went through my divorce.\" The patient is employed full time. The patient reported having some increased financial stress due to his gambling. The patient lacks a current sober peer support network.         Client Choice/Exceptions   Would you like services specific to language, age, gender, culture, Spiritism preference, race, ethnicity, sexual orientation or disability?  No    What particular treatment choices and options would you like to have? IOP    Do you have a preference for a particular treatment program? None    Criteria for Diagnosis     Criteria for Diagnosis  DSM-5 Criteria for Substance Use Disorder  Instructions: Determine whether the client currently meets the criteria for Substance Use Disorder using the diagnostic criteria in the DSM-V pp.481-589. Current means during the most recent 12 months outside a facility that controls access to substances    Category of Substance Severity (ICD-10 Code / DSM 5 Code)     Alcohol Use Disorder Moderate  (F10.20) (303.90)   Cannabis Use Disorder The patient does not meet the criteria for a Cannabis use disorder.   Hallucinogen Use Disorder The patient does not meet the criteria for a Hallucinogen use disorder.   Inhalant Use Disorder The patient does not meet the criteria for an Inhalant use disorder.   Opioid Use Disorder The patient does not meet the criteria for an Opioid use disorder.   Sedative, " Hypnotic, or Anxiolytic Use Disorder The patient does not meet the criteria for a Sedative/Hypnotic use disorder.   Stimulant Related Disorder The patient does not meet the criteria for a Stimulant use disorder.   Tobacco Use Disorder The patient does not meet the criteria for a Tobacco use disorder.   Other (or unknown) Substance Use Disorder The patient does not meet the criteria for a Other (or unknown) Substance use disorder.       Collateral Contact Summary   Number of contacts made: 1    Contact with referring person:  No    If court related records were reviewed, summarize here: No court records had been reviewed at the time of this documentation.    Information from collateral contacts supported/largely agreed with information from the client and associated risk ratings.      Rule 25 Assessment Summary and Plan   's Recommendation    1)  Complete an Intensive Outpatient CD Treatment Program, such as [facility] with Holger Braga.   2)  Abstain from all mood-altering chemicals unless prescribed by a licensed provider.   3)  Attend, at minimum, 3 weekly AA/SOCORRO meetings.  4)  Actively work with a male sponsor on a weekly basis.   5)  Follow all the recommendations of your treatment/medical providers.  6)  Continue to work with your psychotherapist.      Collateral Contacts     Name:    [facility]   Relationship:    [facility]   Phone Number:    NA Releases:    NA     The patient's medical record at [facility] was reviewed and the information contained in the medical record supported the patient's account of his chemical use history and chemical use consequences.  ollateral Contacts      A problematic pattern of alcohol/drug use leading to clinically significant impairment or distress, as manifested by at least two of the following, occurring within a 12-month period: 5/11    2.) There is a persistent desire or unsuccessful efforts to cut down or control alcohol/drug  use  3.) A great deal of time is spent in activities necessary to obtain alcohol, use alcohol, or recover from its effects.  6.) Continued alcohol use despite having persistent or recurrent social or interpersonal problems caused or exacerbated by the effects of alcohol/drug.  9.) Alcohol/drug use is continued despite knowledge of having a persistent or recurrent physical or psychological problem that is likely to have been caused or exacerbated by alcohol.  11.) Withdrawal, as manifested by either of the following: The characteristic withdrawal syndrome for alcohol/drug (refer to Criteria A and B of the criteria set for alcohol/drug withdrawal).      Specify if: In early remission:  After full criteria for alcohol/drug use disorder were previously met, none of the criteria for alcohol/drug use disorder have been met for at least 3 months but for less than 12 months (with the exception that Criterion A4,  Craving or a strong desire or urge to use alcohol/drug  may be met).     In sustained remission:   After full criteria for alcohol use disorder were previously met, none of the criteria for alcohol/drug use disorder have been met at any time during a period of 12 months or longer (with the exception that Criterion A4,  Craving or strong desire or urge to use alcohol/drug  may be met).   Specify if:   This additional specifier is used if the individual is in an environment where access to alcohol is restricted.    Mild: Presence of 2-3 symptoms  Moderate: Presence of 4-5 symptoms  Severe: Presence of 6 or more symptoms
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
## 2019-07-16 NOTE — PROGRESS NOTES
[facility] -  Case Progress Note    Date of Service: 7/16/19  Client Name: Shawn Camejo  YOB: 1980  MRN:  [id number]  Treating Provider: Lux Bravo, PhD Postdoctoral Fellow  Type of Session: Individual  Present in Session: Patient only  Number of Minutes:  53    Health Maintenance Summary - Mental Health Treatment Plan       Status Date      MENTAL HEALTH TX PLAN Next Due 8/28/2019      Done 9/28/2018         Current Symptoms/Status:  The client meets criteria for Other Specified Disruptive, Impulsive-Control, and Conduct Disorder (hypersexuality), which includes behavioral symptoms that cause clinically significant distress and impair social functioning. There appears to be evidence that client's behaviors are sexually compulsive in nature.     The client meets criteria for Major Depressive Disorder, Recurrent Episode, Mild, which includes experiencing anhedonia, worthlessness, hypersomnia, fatigue, and a depressed mood more days than not for a period of at least two weeks.    Client endorses the following anxiety symptoms: difficulty controlling worry; restlessness or feeling keyed up or on edge; being easily fatigued; difficulty concentrating or mind going blank; irritability; muscle tension; and sleep disturbance (difficulty falling or staying asleep, or restless unsatisfying sleep).    The client meets criteria for Posttraumatic Stress Disorder, which includes dissociative reactions, persistent avoidance of distressing memories, negative alternations in cognitions and mood associated with the event, and marked alterations in arousal and reactivity associated with the event. The symptoms have been present for at least one month and cause clinically significant distress or impairment.    The client meets criteria for Alcohol Use Disorder (Moderate), which includes consuming alcohol in larger amounts than intended, giving up social/recreational activities due to use,  continued use despite negative consequences (eg, CSB), and unsuccessful efforts to cut down alcohol use.      Progress Toward Treatment Goals:   The client continues to show interest and commitment towards receiving services at [facility].  The client joined CSB group 4/4/19.      Intervention: Modality and Response:  Client fully oriented to person, place, and time. Therapist employed client-centered and CBT interventions to explore client's compulsive sexual behaviors and ongoing mental health concerns. Client reported following up with MICD referral; attended intake appointment earlier this morning. He reported recognizing need to let go of unhealthy relationship in order to free up time for recovery. Identified need to find a new [facility] sponsor. Therapist encouraged cl to share these insights in group and ask for feedback. Cl was engaged with interventions and receptive to feedback.      Assignment:  -Find a new sponsor.  -Continue attending CSB group.      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     Lux Bravo, PhD  Postdoctoral Fellow

## 2019-07-17 ASSESSMENT — ANXIETY QUESTIONNAIRES: GAD7 TOTAL SCORE: 9

## 2019-07-18 ENCOUNTER — OFFICE VISIT (OUTPATIENT)
Dept: OTHER | Facility: OUTPATIENT CENTER | Age: 39
End: 2019-07-18
Payer: COMMERCIAL

## 2019-07-18 DIAGNOSIS — F91.8 OTHER CONDUCT DISORDERS: Primary | ICD-10-CM

## 2019-07-18 DIAGNOSIS — F41.1 GENERALIZED ANXIETY DISORDER: ICD-10-CM

## 2019-07-18 DIAGNOSIS — F33.0 MAJOR DEPRESSIVE DISORDER, RECURRENT EPISODE, MILD (H): ICD-10-CM

## 2019-07-18 DIAGNOSIS — F43.10 POSTTRAUMATIC STRESS DISORDER: ICD-10-CM

## 2019-07-18 NOTE — PROGRESS NOTES
[facility]   Group Progress Note    Date of Service: 7/18/19  Client Name: Shawn Camejo  YOB: 1980  MRN:  [id number]  Treating Provider: Venita Lares, PhD    Type of Session: Group  Number of Minutes: 120    Health Maintenance Summary - Mental Health Treatment Plan       Status Date      MENTAL HEALTH TX PLAN Next Due 8/28/2019      Done 9/28/2018         Current Symptoms/Status:  The client meets criteria for Other Specified Disruptive, Impulsive-Control, and Conduct Disorder (hypersexuality), which includes behavioral symptoms that cause clinically significant distress and impair social functioning. He has continued to struggle staying within boundaries but he also is unsure of what boundaries to set for himself.     The client meets criteria for Major Depressive Disorder, Recurrent Episode, Mild, which includes experiencing anhedonia, worthlessness, hypersomnia, fatigue, and a depressed mood more days than not for a period of at least two weeks.  Symptoms persist.     Client endorses the following anxiety symptoms: difficulty controlling worry; restlessness or feeling keyed up or on edge; being easily fatigued; difficulty concentrating or mind going blank; irritability; muscle tension; and sleep disturbance (difficulty falling or staying asleep, or restless unsatisfying sleep).  Symptoms persist.     The client meets criteria for Posttraumatic Stress Disorder, which includes dissociative reactions, persistent avoidance of distressing memories, negative alternations in cognitions and mood associated with the event, and marked alterations in arousal and reactivity associated with the event. The symptoms have been present for at least one month and cause clinically significant distress or impairment.  Symptoms persist.    CSB:  Symptoms persist.  Been able to stay away from sex worker.    Alcohol Use Disorder:  Did not drink this week.  Making some progress.    Progress Toward  Treatment Goals:   The client reported difficulties with mood and struggling with avoidance.       Intervention: Modality and Response:  Utilized supportive and CBT techniques were used to address CSB and related mental health issues.      Went for MICD eval.  Will get recommendation.  Back to AA.  Feeling better.  Choosing sobriety!      Assignment:  Stay within defined boundaries.    Interactive Complexity:  Communication difficulties present during current the procedure included the need to manage maladaptive communication related to high anxiety, high reactivity, repeated questions, and resistance that complicated delivery of care.      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      Plan / Need for Future Services:  Return for group therapy on weekly basis.  Continue seeing Dr.. Bravo 2X a month jeremy Lares, PhD

## 2019-07-23 NOTE — PROGRESS NOTES
[facility]  [address] [place], [place] [address]., [place] [address]        7/23/2019      Shawn JENNIFER Camejo  [address]  [place] [address]      Dear Mr. Camejo,    It was a pleasure meeting with you on 7/16/2019 for your Chemical Dependency Evaluation. Based on your evaluation, the recommendation is:  1)  Complete an Intensive Outpatient CD Treatment Program, such as [facility] treatment program with Holger Braga.   2)  Abstain from all mood-altering chemicals unless prescribed by a licensed provider.   3)  Attend, at minimum, 3 weekly AA/SOCORRO meetings.  4)  Actively work with a male sponsor on a weekly basis.   5)  Follow all the recommendations of your treatment/medical providers.  6)  Continue to work with your psychotherapist.    Please contact Holger Braga at [phone] to set up a start date and time.    If I can be of further service, please don't hesitate to call.    Sincerely,        Cheryl Grady MA [facility]  CD Evaluation Counselor  [phone]    (I spoke with pt about being referred to [facility] group and this letter was securely emailed to pt on 7/23/2019)

## 2019-07-25 ENCOUNTER — OFFICE VISIT (OUTPATIENT)
Dept: OTHER | Facility: OUTPATIENT CENTER | Age: 39
End: 2019-07-25
Payer: COMMERCIAL

## 2019-07-25 DIAGNOSIS — F43.10 POSTTRAUMATIC STRESS DISORDER: ICD-10-CM

## 2019-07-25 DIAGNOSIS — F41.1 GENERALIZED ANXIETY DISORDER: ICD-10-CM

## 2019-07-25 DIAGNOSIS — F91.8 OTHER CONDUCT DISORDERS: Primary | ICD-10-CM

## 2019-07-25 DIAGNOSIS — F10.21 ALCOHOL USE DISORDER, MODERATE, IN EARLY REMISSION, DEPENDENCE (H): ICD-10-CM

## 2019-07-25 DIAGNOSIS — F33.0 MAJOR DEPRESSIVE DISORDER, RECURRENT EPISODE, MILD (H): ICD-10-CM

## 2019-07-25 NOTE — PROGRESS NOTES
[facility]   Group Progress Note    Date of Service: 7/25/19  Client Name: Shawn Camejo  YOB: 1980  MRN:  [id number]  Treating Provider: Venita Lares, PhD    Type of Session: Group  Number of Minutes: 120    Health Maintenance Summary - Mental Health Treatment Plan       Status Date      MENTAL HEALTH [place] PLAN Next Due 8/28/2019      Done 9/28/2018         Current Symptoms/Status:  The client meets criteria for Other Specified Disruptive, Impulsive-Control, and Conduct Disorder (hypersexuality), which includes behavioral symptoms that cause clinically significant distress and impair social functioning. He has continued to struggle staying within boundaries but he also is unsure of what boundaries to set for himself.     The client meets criteria for Major Depressive Disorder, Recurrent Episode, Mild, which includes experiencing anhedonia, worthlessness, hypersomnia, fatigue, and a depressed mood more days than not for a period of at least two weeks.  Symptoms persist.     Client endorses the following anxiety symptoms: difficulty controlling worry; restlessness or feeling keyed up or on edge; being easily fatigued; difficulty concentrating or mind going blank; irritability; muscle tension; and sleep disturbance (difficulty falling or staying asleep, or restless unsatisfying sleep).  Symptoms persist.     The client meets criteria for Posttraumatic Stress Disorder, which includes dissociative reactions, persistent avoidance of distressing memories, negative alternations in cognitions and mood associated with the event, and marked alterations in arousal and reactivity associated with the event. The symptoms have been present for at least one month and cause clinically significant distress or impairment.  Symptoms persist.    CSB:  Symptoms persist.  Saw sex worker.    Alcohol Use Disorder:  Drank again this week.  Regressed.    Progress Toward Treatment Goals:   The client reported  difficulties with mood and struggling with avoidance. Has scheduled day treatment.      Intervention: Modality and Response:  Utilized supportive and CBT techniques were used to address CSB and related mental health issues.      Went for [facility].  Will start next week hopefully.  Back to AA.    Wasn't able to stay within boundaries.  But a bit hopeful about getting some more treatment.      Assignment:  Stay within defined boundaries.    Interactive Complexity:  Communication difficulties present during current the procedure included the need to manage maladaptive communication related to high anxiety, high reactivity, repeated questions, and resistance that complicated delivery of care.      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      Plan / Need for Future Services:  Return for group therapy on weekly basis.  Continue seeing Dr.. Bravo 2X a month jeremy Lares, PhD

## 2019-07-31 ENCOUNTER — OFFICE VISIT (OUTPATIENT)
Dept: OTHER | Facility: OUTPATIENT CENTER | Age: 39
End: 2019-07-31
Payer: COMMERCIAL

## 2019-07-31 DIAGNOSIS — F43.10 POSTTRAUMATIC STRESS DISORDER: ICD-10-CM

## 2019-07-31 DIAGNOSIS — F10.21 ALCOHOL USE DISORDER, MODERATE, IN EARLY REMISSION, DEPENDENCE (H): ICD-10-CM

## 2019-07-31 DIAGNOSIS — F91.8 OTHER CONDUCT DISORDERS: Primary | ICD-10-CM

## 2019-07-31 DIAGNOSIS — F41.1 GENERALIZED ANXIETY DISORDER: ICD-10-CM

## 2019-07-31 DIAGNOSIS — F33.0 MAJOR DEPRESSIVE DISORDER, RECURRENT EPISODE, MILD (H): ICD-10-CM

## 2019-07-31 NOTE — PROGRESS NOTES
[facility] -  [facility] Progress Note    Date of Service: 7/31/19  Client Name: Shawn Camejo  YOB: 1980  MRN:  [id number]  Treating Provider: Lux Bravo, PhD Postdoctoral Fellow  Type of Session: Individual  Present in Session: Patient only  Number of Minutes:  53    Health Maintenance Summary - Mental Health Treatment Plan       Status Date      MENTAL HEALTH TX PLAN Next Due 8/28/2019      Done 9/28/2018         Current Symptoms/Status:  The client meets criteria for Other Specified Disruptive, Impulsive-Control, and Conduct Disorder (hypersexuality), which includes behavioral symptoms that cause clinically significant distress and impair social functioning. There appears to be evidence that client's behaviors are sexually compulsive in nature.     The client meets criteria for Major Depressive Disorder, Recurrent Episode, Mild, which includes experiencing anhedonia, worthlessness, hypersomnia, fatigue, and a depressed mood more days than not for a period of at least two weeks.    Client endorses the following anxiety symptoms: difficulty controlling worry; restlessness or feeling keyed up or on edge; being easily fatigued; difficulty concentrating or mind going blank; irritability; muscle tension; and sleep disturbance (difficulty falling or staying asleep, or restless unsatisfying sleep).    The client meets criteria for Posttraumatic Stress Disorder, which includes dissociative reactions, persistent avoidance of distressing memories, negative alternations in cognitions and mood associated with the event, and marked alterations in arousal and reactivity associated with the event. The symptoms have been present for at least one month and cause clinically significant distress or impairment.    The client meets criteria for Alcohol Use Disorder (Moderate), which includes consuming alcohol in larger amounts than intended, giving up social/recreational activities due to use,  continued use despite negative consequences (eg, CSB), and unsuccessful efforts to cut down alcohol use.      Progress Toward Treatment Goals:   The client continues to show interest and commitment towards receiving services at [facility].  The client joined CSB group 4/4/19.      Intervention: Modality and Response:  Client fully oriented to person, place, and time. Therapist employed client-centered and CBT interventions to explore client's compulsive sexual behaviors and ongoing mental health concerns. Cl will begin dual diagnosis program at [facility] on 8/13/19. Reported difficulty maintaining sobriety after receiving his paycheck; [place] $200-600 each paycheck [place] and hiring prostitutes. Interventions helped cl identify antecedent emotions, and therapist encouraged cl to journal on these emotions throughout the week to identify coping strategies. Reported relationship with stripper has tapered off. Identified wanting to feel greater connection with others and moving sober houses as short-term goals. He was engaged and receptive to interventions.      Assignment:  -Find a new sponsor.  -Continue attending CSB group.      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     Lux Bravo, PhD  Postdoctoral Fellow

## 2019-08-01 ENCOUNTER — OFFICE VISIT (OUTPATIENT)
Dept: OTHER | Facility: OUTPATIENT CENTER | Age: 39
End: 2019-08-01
Payer: COMMERCIAL

## 2019-08-01 DIAGNOSIS — F91.8 OTHER CONDUCT DISORDERS: Primary | ICD-10-CM

## 2019-08-01 DIAGNOSIS — F10.21 ALCOHOL USE DISORDER, MODERATE, IN EARLY REMISSION, DEPENDENCE (H): ICD-10-CM

## 2019-08-01 DIAGNOSIS — F33.0 MAJOR DEPRESSIVE DISORDER, RECURRENT EPISODE, MILD (H): ICD-10-CM

## 2019-08-01 NOTE — PROGRESS NOTES
[facility]   Group Progress Note    Date of Service: 8/01/19  Client Name: Shawn Camejo  YOB: 1980  MRN:  [id number]  Treating Provider: Venita Lares, PhD    Type of Session: Group  Number of Minutes: 120    Health Maintenance Summary - Mental Health Treatment Plan       Status Date      MENTAL HEALTH TX PLAN Next Due 8/28/2019      Done 9/28/2018         Current Symptoms/Status:  The client meets criteria for Other Specified Disruptive, Impulsive-Control, and Conduct Disorder (hypersexuality), which includes behavioral symptoms that cause clinically significant distress and impair social functioning. He has continued to struggle staying within boundaries but he also is unsure of what boundaries to set for himself.     The client meets criteria for Major Depressive Disorder, Recurrent Episode, Mild, which includes experiencing anhedonia, worthlessness, hypersomnia, fatigue, and a depressed mood more days than not for a period of at least two weeks.  Symptoms persist.     Client endorses the following anxiety symptoms: difficulty controlling worry; restlessness or feeling keyed up or on edge; being easily fatigued; difficulty concentrating or mind going blank; irritability; muscle tension; and sleep disturbance (difficulty falling or staying asleep, or restless unsatisfying sleep).  Symptoms persist.     The client meets criteria for Posttraumatic Stress Disorder, which includes dissociative reactions, persistent avoidance of distressing memories, negative alternations in cognitions and mood associated with the event, and marked alterations in arousal and reactivity associated with the event. The symptoms have been present for at least one month and cause clinically significant distress or impairment.  Symptoms persist.    CSB:  Symptoms persist.  No sex worker.    Alcohol Use Disorder:  Didn't drink.    Progress Toward Treatment Goals:   The client reported difficulties with mood and  struggling with avoidance. Has scheduled day treatment.      Intervention: Modality and Response:  Utilized supportive and CBT techniques were used to address CSB and related mental health issues.      Starts MICD on the 13th.  Back to AA.    Going to go to [facility]  Was able to stay within boundaries.  Struggling to stay away from sex worker.  Processed how to stay within the boundaries.      Assignment:  Stay within defined boundaries.    Interactive Complexity:  Communication difficulties present during current the procedure included the need to manage maladaptive communication related to high anxiety, high reactivity, repeated questions, and resistance that complicated delivery of care.      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      Plan / Need for Future Services:  Return for group therapy on weekly basis.  Continue seeing Dr.. Bravo 2X a month jeremy Lares, PhD

## 2019-08-06 ENCOUNTER — OFFICE VISIT (OUTPATIENT)
Dept: OTHER | Facility: OUTPATIENT CENTER | Age: 39
End: 2019-08-06
Payer: COMMERCIAL

## 2019-08-06 DIAGNOSIS — F33.0 MAJOR DEPRESSIVE DISORDER, RECURRENT EPISODE, MILD (H): ICD-10-CM

## 2019-08-06 DIAGNOSIS — F10.21 ALCOHOL USE DISORDER, MODERATE, IN EARLY REMISSION, DEPENDENCE (H): ICD-10-CM

## 2019-08-06 DIAGNOSIS — F91.8 OTHER CONDUCT DISORDERS: Primary | ICD-10-CM

## 2019-08-06 DIAGNOSIS — F43.10 POSTTRAUMATIC STRESS DISORDER: ICD-10-CM

## 2019-08-06 DIAGNOSIS — F41.1 GENERALIZED ANXIETY DISORDER: ICD-10-CM

## 2019-08-06 NOTE — PROGRESS NOTES
[facility] -  Case Progress Note    Date of Service: 8/06/19  Client Name: Shawn Camejo  YOB: 1980  MRN:  [id number]  Treating Provider: Lux Bravo, PhD Postdoctoral Fellow  Type of Session: Individual  Present in Session: Patient only  Number of Minutes:  53    Health Maintenance Summary - Mental Health Treatment Plan       Status Date      MENTAL HEALTH TX PLAN Next Due 8/28/2019      Done 9/28/2018         Current Symptoms/Status:  The client meets criteria for Other Specified Disruptive, Impulsive-Control, and Conduct Disorder (hypersexuality), which includes behavioral symptoms that cause clinically significant distress and impair social functioning. There appears to be evidence that client's behaviors are sexually compulsive in nature.     The client meets criteria for Major Depressive Disorder, Recurrent Episode, Mild, which includes experiencing anhedonia, worthlessness, hypersomnia, fatigue, and a depressed mood more days than not for a period of at least two weeks.    Client endorses the following anxiety symptoms: difficulty controlling worry; restlessness or feeling keyed up or on edge; being easily fatigued; difficulty concentrating or mind going blank; irritability; muscle tension; and sleep disturbance (difficulty falling or staying asleep, or restless unsatisfying sleep).    The client meets criteria for Posttraumatic Stress Disorder, which includes dissociative reactions, persistent avoidance of distressing memories, negative alternations in cognitions and mood associated with the event, and marked alterations in arousal and reactivity associated with the event. The symptoms have been present for at least one month and cause clinically significant distress or impairment.    The client meets criteria for Alcohol Use Disorder (Moderate), which includes consuming alcohol in larger amounts than intended, giving up social/recreational activities due to use,  continued use despite negative consequences (eg, CSB), and unsuccessful efforts to cut down alcohol use.      Progress Toward Treatment Goals:   The client continues to show interest and commitment towards receiving services at [facility].  The client joined CSB group 4/4/19.      Intervention: Modality and Response:  Client fully oriented to person, place, and time. Therapist employed client-centered and CBT interventions to explore client's compulsive sexual behaviors and ongoing mental health concerns. 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 Chain analysis revealed negative messages about finances from father, and cl broke down and cried when asked to consider writing a letter to his father. Through tears, he spoke about not seeing his family in 12 years. Interventions validated the pain cl felt, and cl was encouraged to seek support from his 12-step groups, CSB group, and an adult childrens' group. This session is particularly noteworthy as it is the first time in 11 months of therapy that he broke down and cried; this behavior suggests significant unresolved grief related to family, particularly father.      Assignment:  -Find a new sponsor.  -Continue attending CSB group.      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     Lux Bravo, PhD  Postdoctoral Fellow

## 2019-08-08 ENCOUNTER — OFFICE VISIT (OUTPATIENT)
Dept: OTHER | Facility: OUTPATIENT CENTER | Age: 39
End: 2019-08-08
Payer: COMMERCIAL

## 2019-08-08 DIAGNOSIS — F41.1 GENERALIZED ANXIETY DISORDER: ICD-10-CM

## 2019-08-08 DIAGNOSIS — F91.8 OTHER CONDUCT DISORDERS: Primary | ICD-10-CM

## 2019-08-08 NOTE — PROGRESS NOTES
[facility]   Group Progress Note    Date of Service: 8/08/19  Client Name: Shawn Camejo  YOB: 1980  MRN:  [id number]  Treating Provider: Venita Lares, PhD    Type of Session: Group  Number of Minutes: 120    Health Maintenance Summary - Mental Health Treatment Plan       Status Date      MENTAL HEALTH TX PLAN Next Due 8/28/2019      Done 9/28/2018         Current Symptoms/Status:  The client meets criteria for Other Specified Disruptive, Impulsive-Control, and Conduct Disorder (hypersexuality), which includes behavioral symptoms that cause clinically significant distress and impair social functioning. He has continued to struggle staying within boundaries but he also is unsure of what boundaries to set for himself.     The client meets criteria for Major Depressive Disorder, Recurrent Episode, Mild, which includes experiencing anhedonia, worthlessness, hypersomnia, fatigue, and a depressed mood more days than not for a period of at least two weeks.  Symptoms persist.     Client endorses the following anxiety symptoms: difficulty controlling worry; restlessness or feeling keyed up or on edge; being easily fatigued; difficulty concentrating or mind going blank; irritability; muscle tension; and sleep disturbance (difficulty falling or staying asleep, or restless unsatisfying sleep).  Symptoms persist.     The client meets criteria for Posttraumatic Stress Disorder, which includes dissociative reactions, persistent avoidance of distressing memories, negative alternations in cognitions and mood associated with the event, and marked alterations in arousal and reactivity associated with the event. The symptoms have been present for at least one month and cause clinically significant distress or impairment.  Symptoms persist.    CSB:  Symptoms persist.  Saw sex worker.    Alcohol Use Disorder:  Didn't drink.  But went to two AA groups and his [facility] group.    Progress Toward Treatment  Goals:   The client reported difficulties with mood and struggling with avoidance. Has scheduled day treatment.      Intervention: Modality and Response:  Utilized supportive and CBT techniques were used to address CSB and related mental health issues.      Starts MICD on the 13th.  Back to AA.    Going to go to [facility]  Was not able stay within boundaries.  Was able to stay away from sex worker that he was in a relationship with.  Processed how to stay within the boundaries.      Assignment:  Stay within defined boundaries.    Interactive Complexity:  Communication difficulties present during current the procedure included the need to manage maladaptive communication related to high anxiety, high reactivity, repeated questions, and resistance that complicated delivery of care.      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      Plan / Need for Future Services:  Return for group therapy on weekly basis.  Continue seeing Dr. Bravo 2X a month jeremy Lares, PhD

## 2019-08-12 ENCOUNTER — OFFICE VISIT (OUTPATIENT)
Dept: OTHER | Facility: OUTPATIENT CENTER | Age: 39
End: 2019-08-12
Payer: COMMERCIAL

## 2019-08-12 DIAGNOSIS — F33.0 MAJOR DEPRESSIVE DISORDER, RECURRENT EPISODE, MILD (H): ICD-10-CM

## 2019-08-12 DIAGNOSIS — F91.8 OTHER CONDUCT DISORDERS: Primary | ICD-10-CM

## 2019-08-12 DIAGNOSIS — F10.21 ALCOHOL USE DISORDER, MODERATE, IN EARLY REMISSION, DEPENDENCE (H): ICD-10-CM

## 2019-08-12 DIAGNOSIS — F43.10 POSTTRAUMATIC STRESS DISORDER: ICD-10-CM

## 2019-08-12 DIAGNOSIS — F41.1 GENERALIZED ANXIETY DISORDER: ICD-10-CM

## 2019-08-12 NOTE — PROGRESS NOTES
[facility] -  Case Progress Note    Date of Service: 8/12/19  Client Name: Shawn Camejo  YOB: 1980  MRN:  [id number]  Treating Provider: Lux Bravo, PhD Postdoctoral Fellow  Type of Session: Individual  Present in Session: Patient only  Number of Minutes:  53    Health Maintenance Summary - Mental Health Treatment Plan       Status Date      MENTAL HEALTH TX PLAN Next Due 8/28/2019      Done 9/28/2018         Current Symptoms/Status:  The client meets criteria for Other Specified Disruptive, Impulsive-Control, and Conduct Disorder (hypersexuality), which includes behavioral symptoms that cause clinically significant distress and impair social functioning. There appears to be evidence that client's behaviors are sexually compulsive in nature.     The client meets criteria for Major Depressive Disorder, Recurrent Episode, Mild, which includes experiencing anhedonia, worthlessness, hypersomnia, fatigue, and a depressed mood more days than not for a period of at least two weeks.    Client endorses the following anxiety symptoms: difficulty controlling worry; restlessness or feeling keyed up or on edge; being easily fatigued; difficulty concentrating or mind going blank; irritability; muscle tension; and sleep disturbance (difficulty falling or staying asleep, or restless unsatisfying sleep).    The client meets criteria for Posttraumatic Stress Disorder, which includes dissociative reactions, persistent avoidance of distressing memories, negative alternations in cognitions and mood associated with the event, and marked alterations in arousal and reactivity associated with the event. The symptoms have been present for at least one month and cause clinically significant distress or impairment.    The client meets criteria for Alcohol Use Disorder (Moderate), which includes consuming alcohol in larger amounts than intended, giving up social/recreational activities due to use,  continued use despite negative consequences (eg, CSB), and unsuccessful efforts to cut down alcohol use.      Progress Toward Treatment Goals:   The client continues to show interest and commitment towards receiving services at [facility].  The client joined CSB group 4/4/19.      Intervention: Modality and Response:  Client fully oriented to person, place, and time. Therapist employed client-centered and CBT interventions to explore client's compulsive sexual behaviors and ongoing mental health concerns. Cl reported ongoing financial difficulties and stress related to making copays. He reported upcoming paycheck will help alleviate this stress, and we identified coping strategies to engage in the rest of the week. He identified numerous forms of social support and will begin MICD group at [facility] tomorrow. In assessing what he looks for in social relationships, he reported authenticity and pleasantness. Therapist encouraged cl to intentionally make connections in accordance with his values. Cl was open and responsive to feedback.      Assignment:  -Continue attending CSB group.  -Continue reflecting on family relationships.      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     Lux Bravo, PhD  Postdoctoral Fellow

## 2019-08-15 ENCOUNTER — OFFICE VISIT (OUTPATIENT)
Dept: OTHER | Facility: OUTPATIENT CENTER | Age: 39
End: 2019-08-15
Payer: COMMERCIAL

## 2019-08-15 DIAGNOSIS — F41.1 GENERALIZED ANXIETY DISORDER: ICD-10-CM

## 2019-08-15 DIAGNOSIS — F91.8 OTHER CONDUCT DISORDERS: Primary | ICD-10-CM

## 2019-08-15 DIAGNOSIS — F10.21 ALCOHOL USE DISORDER, MODERATE, IN EARLY REMISSION, DEPENDENCE (H): ICD-10-CM

## 2019-08-15 DIAGNOSIS — F33.0 MAJOR DEPRESSIVE DISORDER, RECURRENT EPISODE, MILD (H): ICD-10-CM

## 2019-08-15 NOTE — PROGRESS NOTES
[facility]   Group Progress Note    Date of Service: 8/15/19  Client Name: Shawn Camejo  YOB: 1980  MRN:  [id number]  Treating Provider: Venita Lares, PhD    Type of Session: Group  Number of Minutes: 120    Health Maintenance Summary - Mental Health Treatment Plan       Status Date      MENTAL HEALTH TX PLAN Next Due 8/28/2019      Done 9/28/2018         Current Symptoms/Status:  The client meets criteria for Other Specified Disruptive, Impulsive-Control, and Conduct Disorder (hypersexuality), which includes behavioral symptoms that cause clinically significant distress and impair social functioning. He has continued to struggle staying within boundaries but he also is unsure of what boundaries to set for himself.     The client meets criteria for Major Depressive Disorder, Recurrent Episode, Mild, which includes experiencing anhedonia, worthlessness, hypersomnia, fatigue, and a depressed mood more days than not for a period of at least two weeks.  Symptoms persist.     Client endorses the following anxiety symptoms: difficulty controlling worry; restlessness or feeling keyed up or on edge; being easily fatigued; difficulty concentrating or mind going blank; irritability; muscle tension; and sleep disturbance (difficulty falling or staying asleep, or restless unsatisfying sleep).  Symptoms persist.     The client meets criteria for Posttraumatic Stress Disorder, which includes dissociative reactions, persistent avoidance of distressing memories, negative alternations in cognitions and mood associated with the event, and marked alterations in arousal and reactivity associated with the event. The symptoms have been present for at least one month and cause clinically significant distress or impairment.  Symptoms persist.    CSB: Stayed within boundaries.    Alcohol Use Disorder:  Didn't drink.  Went  to two AA groups and his [facility] group.    Progress Toward Treatment Goals:   The client  reported difficulties with mood and struggling with avoidance. Has started day treatment.      Intervention: Modality and Response:  Utilized supportive and CBT techniques were used to address CSB and related mental health issues.      Started MICD on the 13th.  Back to AA.    Going to go to [facility]  Staying  within boundaries.  Processed how to address risk situation tomorrow.  Getting paid.  Getting good feedback.     Assignment:  Stay within defined boundaries.    Interactive Complexity:  Communication difficulties present during current the procedure included the need to manage maladaptive communication related to high anxiety, high reactivity, repeated questions, and resistance that complicated delivery of care.      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      Plan / Need for Future Services:  Return for group therapy on weekly basis.  Continue seeing Dr. Bravo 2X a month jeremy Lares, PhD

## 2019-08-20 ENCOUNTER — OFFICE VISIT (OUTPATIENT)
Dept: OTHER | Facility: OUTPATIENT CENTER | Age: 39
End: 2019-08-20
Payer: COMMERCIAL

## 2019-08-20 DIAGNOSIS — F91.8 OTHER CONDUCT DISORDERS: Primary | ICD-10-CM

## 2019-08-20 DIAGNOSIS — F41.1 GENERALIZED ANXIETY DISORDER: ICD-10-CM

## 2019-08-20 DIAGNOSIS — F43.10 POSTTRAUMATIC STRESS DISORDER: ICD-10-CM

## 2019-08-20 DIAGNOSIS — F33.0 MAJOR DEPRESSIVE DISORDER, RECURRENT EPISODE, MILD (H): ICD-10-CM
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
## 2019-08-22 ENCOUNTER — OFFICE VISIT (OUTPATIENT)
Dept: OTHER | Facility: OUTPATIENT CENTER | Age: 39
End: 2019-08-22
Payer: COMMERCIAL

## 2019-08-22 DIAGNOSIS — F33.0 MAJOR DEPRESSIVE DISORDER, RECURRENT EPISODE, MILD (H): ICD-10-CM

## 2019-08-22 DIAGNOSIS — F41.1 GENERALIZED ANXIETY DISORDER: ICD-10-CM

## 2019-08-22 DIAGNOSIS — F91.8 OTHER CONDUCT DISORDERS: ICD-10-CM

## 2019-08-22 DIAGNOSIS — F91.8 OTHER CONDUCT DISORDERS: Primary | ICD-10-CM

## 2019-08-22 DIAGNOSIS — F10.21 ALCOHOL USE DISORDER, MODERATE, IN EARLY REMISSION, DEPENDENCE (H): ICD-10-CM

## 2019-08-22 RX ORDER — SERTRALINE HYDROCHLORIDE 100 MG/1
100 TABLET, FILM COATED ORAL DAILY
Qty: 90 TABLET | Refills: 0 | Status: SHIPPED | OUTPATIENT
Start: 2019-08-22 | End: 2019-10-10

## 2019-08-22 RX ORDER — NALTREXONE HYDROCHLORIDE 50 MG/1
TABLET, FILM COATED ORAL
Qty: 30 TABLET | Refills: 3 | Status: SHIPPED | OUTPATIENT
Start: 2019-08-22 | End: 2019-10-10

## 2019-08-22 NOTE — PROGRESS NOTES
[place] [facility]   Group Progress Note    Date of Service: 8/22/19  Client Name: Shawn Camejo  YOB: 1980  MRN:  [id number]  Treating Provider: Venita Lares, PhD    Type of Session: Group  Number of Minutes: 120    Health Maintenance Summary - Mental Health Treatment Plan       Status Date      MENTAL HEALTH TX PLAN Next Due 8/28/2019      Done 9/28/2018         Current Symptoms/Status:  The client meets criteria for Other Specified Disruptive, Impulsive-Control, and Conduct Disorder (hypersexuality), which includes behavioral symptoms that cause clinically significant distress and impair social functioning. He has continued to struggle staying within boundaries but he also is unsure of what boundaries to set for himself.     The client meets criteria for Major Depressive Disorder, Recurrent Episode, Mild, which includes experiencing anhedonia, worthlessness, hypersomnia, fatigue, and a depressed mood more days than not for a period of at least two weeks.  Symptoms persist.     Client endorses the following anxiety symptoms: difficulty controlling worry; restlessness or feeling keyed up or on edge; being easily fatigued; difficulty concentrating or mind going blank; irritability; muscle tension; and sleep disturbance (difficulty falling or staying asleep, or restless unsatisfying sleep).  Symptoms persist.     The client meets criteria for Posttraumatic Stress Disorder, which includes dissociative reactions, persistent avoidance of distressing memories, negative alternations in cognitions and mood associated with the event, and marked alterations in arousal and reactivity associated with the event. The symptoms have been present for at least one month and cause clinically significant distress or impairment.  Symptoms persist.    CSB: Not staying within boundaries.    Alcohol Use Disorder:  Didn't drink.  Went  to two AA groups and his [facility] group.    Progress Toward Treatment Goals:   The  client reported difficulties with mood and struggling with avoidance. Has started day treatment.      Intervention: Modality and Response:  Utilized supportive and CBT techniques were used to address CSB and related mental health issues.      STill struggling with boundaries.  Was not able to employ safety plans.  Feeling very depressed as a result.  Recognizing that he is Started MICD on the 13th.  Getting good feedback.     Assignment:  Stay within defined boundaries.    Interactive Complexity:  Communication difficulties present during current the procedure included the need to manage maladaptive communication related to high anxiety, high reactivity, repeated questions, and resistance that complicated delivery of care.      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      Plan / Need for Future Services:  Return for group therapy on weekly basis.  Continue seeing Dr. Bravo 2X a month jeremy Lares, PhD

## 2019-08-22 NOTE — TELEPHONE ENCOUNTER
Requested Medication: Zoloft   Dose: 100mg  Quantity: 90  Refills: 0    Take 1 tablet daily    ____________    Requested Medication: Naltrxone  Dose: 50 mg  Quantity: 30  Refills: 0    Take 1 tablet daily       Last seen at [facility]: 4/4 -   Next Appointment with Provider: 9/19    Roopa Meléndez CMA

## 2019-08-26 ENCOUNTER — OFFICE VISIT (OUTPATIENT)
Dept: OTHER | Facility: OUTPATIENT CENTER | Age: 39
End: 2019-08-26
Payer: COMMERCIAL

## 2019-08-26 DIAGNOSIS — F43.10 POSTTRAUMATIC STRESS DISORDER: ICD-10-CM

## 2019-08-26 DIAGNOSIS — F91.8 OTHER CONDUCT DISORDERS: Primary | ICD-10-CM

## 2019-08-26 DIAGNOSIS — F10.21 ALCOHOL USE DISORDER, MODERATE, IN EARLY REMISSION, DEPENDENCE (H): ICD-10-CM

## 2019-08-26 DIAGNOSIS — F33.0 MAJOR DEPRESSIVE DISORDER, RECURRENT EPISODE, MILD (H): ICD-10-CM

## 2019-08-26 DIAGNOSIS — F41.1 GENERALIZED ANXIETY DISORDER: ICD-10-CM

## 2019-08-26 NOTE — PROGRESS NOTES
[facility] -  Case Progress Note    Date of Service: 8/26/19  Client Name: Shawn Camejo  YOB: 1980  MRN:  [id number]  Treating Provider: Lux Bravo, PhD Postdoctoral Fellow  Type of Session: Individual  Present in Session: Patient only  Number of Minutes:  53    Health Maintenance Summary - Mental Health Treatment Plan       Status Date      MENTAL HEALTH TX PLAN Next Due 8/28/2019      Done 9/28/2018         Current Symptoms/Status:  The client meets criteria for Other Specified Disruptive, Impulsive-Control, and Conduct Disorder (hypersexuality), which includes behavioral symptoms that cause clinically significant distress and impair social functioning. There appears to be evidence that client's behaviors are sexually compulsive in nature.     The client meets criteria for Major Depressive Disorder, Recurrent Episode, Mild, which includes experiencing anhedonia, worthlessness, hypersomnia, fatigue, and a depressed mood more days than not for a period of at least two weeks.    Client endorses the following anxiety symptoms: difficulty controlling worry; restlessness or feeling keyed up or on edge; being easily fatigued; difficulty concentrating or mind going blank; irritability; muscle tension; and sleep disturbance (difficulty falling or staying asleep, or restless unsatisfying sleep).    The client meets criteria for Posttraumatic Stress Disorder, which includes dissociative reactions, persistent avoidance of distressing memories, negative alternations in cognitions and mood associated with the event, and marked alterations in arousal and reactivity associated with the event. The symptoms have been present for at least one month and cause clinically significant distress or impairment.    The client meets criteria for Alcohol Use Disorder (Moderate), which includes consuming alcohol in larger amounts than intended, giving up social/recreational activities due to use,  "continued use despite negative consequences (eg, CSB), and unsuccessful efforts to cut down alcohol use.      Progress Toward Treatment Goals:   The client continues to show interest and commitment towards receiving services at [facility].  The client joined CSB group 4/4/19.      Intervention: Modality and Response:  Client fully oriented to person, place, and time. Therapist employed client-centered and CBT interventions to explore client's compulsive sexual behaviors and ongoing mental health concerns. Cl reported ongoing guilt and shame related to previous acting out behaviors, and interventions focused on family antecedents of these emotions. Cl discussed dysfunctional family dynamics and reported feeling \"frustrated\" toward parents. Therapist printed off list of Adult Childrens Anonymous groups and assigned cl task of visiting at least one group before our next session. Cl believes intentionally visiting family dynamics will be helpful in his recovery as he reported \"avoiding\" this topic previously. Open and receptive to feedback.      Assignment:  -Continue attending CSB group.  -Continue reflecting on family relationships.      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     Lux Bravo, PhD  Postdoctoral Fellow  "

## 2019-08-27 ENCOUNTER — HOSPITAL ENCOUNTER (OUTPATIENT)
Dept: BEHAVIORAL HEALTH | Facility: CLINIC | Age: 39
End: 2019-08-27
Attending: SOCIAL WORKER
Payer: COMMERCIAL

## 2019-08-27 PROCEDURE — H2035 A/D TX PROGRAM, PER HOUR: HCPCS

## 2019-08-27 PROCEDURE — H2035 A/D TX PROGRAM, PER HOUR: HCPCS | Mod: HQ

## 2019-08-27 NOTE — PROGRESS NOTES
Initial Services Plan        Service Initiation Date: 8/27/2019    Immediate health and/or safety concerns: No    Identify health and safety concern(s) below and include plan to address:    None Identified    Treatment suggestions for client during the time between intake (admit date) and completion of the individual treatment plan:     Look for a sober support network, i.e. 12 step, [facility] Recovery, [facility], etc  Tour the treatment center or outpatient clinic  Introduce yourself to your treatment group. Spend time getting to know your peers  Review your patient or client handbook  Begin working on your treatment goal list    Completed by: HARPREET Carmichael, [facility]  Date completed: 8/27/2019 at 8:42 AM
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
## 2019-08-27 NOTE — PROGRESS NOTES
Client:  Shawn Camejo  MRN: [id number]  Date: 8/13/2019    Comprehensive Assessment UPDATE        Comprehensive Summary Update and Review  Counselor/Therapist met with Client on 8/27/2019 and reviewed the Comprehensive Assessment.    The following updates have been made: Last use date changed from 07/09/19 to 07/27/19 for alcohol and marijuana. Client denied any other changes from Comprehensive Assessment.      [place] Re-Assessment:     Have you ever wished you were dead or that you could go to sleep and not wake up? Lifetime: YES- Reports approximately a year ago after his divorce and had some thoughts when up in [place].  Past Month? NO    Have you actually had any thoughts of killing yourself? Lifetime: Yes- Reports in 2010 he had wanted to jump off a bridge but didn't act on it. Past Month? NO    Have you been thinking about how you might do this? Lifetime: Yes.  Describe- Jumping off a bridge.  Past Month?  No     Have you had these thoughts and had some intention of acting on them?  Lifetime: No  Past Month?  No     Have you started to work out the details of how to kill yourself? Lifetime: Yes.  Describe- Thoughts of jumping off bridge.  Past Month?  No     Do you intend to carry out this plan?  No     When you have the thoughts how long do they last?   Fleeting - few seconds or minutes    Are there things - anyone or anything (ie Family, Rastafarian, pain of death) that stopped you from wanting to die or acting on thoughts of suicide?   Protective factors definately stopped you from attempting suicide        2008  [employer]  Used with permission by Nikki Garcia, PhD.        Holger Braga MA, LMFT, [facility] [place]  Licensed Psychotherapist

## 2019-08-28 ENCOUNTER — HOSPITAL ENCOUNTER (OUTPATIENT)
Dept: BEHAVIORAL HEALTH | Facility: CLINIC | Age: 39
End: 2019-08-28
Attending: SOCIAL WORKER
Payer: COMMERCIAL

## 2019-08-28 PROCEDURE — H2035 A/D TX PROGRAM, PER HOUR: HCPCS | Mod: HQ
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
## 2019-08-29 ENCOUNTER — OFFICE VISIT (OUTPATIENT)
Dept: OTHER | Facility: OUTPATIENT CENTER | Age: 39
End: 2019-08-29
Payer: COMMERCIAL

## 2019-08-29 DIAGNOSIS — F91.8 OTHER CONDUCT DISORDERS: Primary | ICD-10-CM

## 2019-08-29 DIAGNOSIS — F41.1 GENERALIZED ANXIETY DISORDER: ICD-10-CM

## 2019-08-29 DIAGNOSIS — F10.21 ALCOHOL USE DISORDER, MODERATE, IN EARLY REMISSION, DEPENDENCE (H): ICD-10-CM

## 2019-08-29 DIAGNOSIS — F33.0 MAJOR DEPRESSIVE DISORDER, RECURRENT EPISODE, MILD (H): ICD-10-CM

## 2019-08-29 ASSESSMENT — ANXIETY QUESTIONNAIRES
7. FEELING AFRAID AS IF SOMETHING AWFUL MIGHT HAPPEN: MORE THAN HALF THE DAYS
GAD7 TOTAL SCORE: 11
4. TROUBLE RELAXING: MORE THAN HALF THE DAYS
IF YOU CHECKED OFF ANY PROBLEMS ON THIS QUESTIONNAIRE, HOW DIFFICULT HAVE THESE PROBLEMS MADE IT FOR YOU TO DO YOUR WORK, TAKE CARE OF THINGS AT HOME, OR GET ALONG WITH OTHER PEOPLE: SOMEWHAT DIFFICULT
3. WORRYING TOO MUCH ABOUT DIFFERENT THINGS: MORE THAN HALF THE DAYS
2. NOT BEING ABLE TO STOP OR CONTROL WORRYING: SEVERAL DAYS
5. BEING SO RESTLESS THAT IT IS HARD TO SIT STILL: SEVERAL DAYS
1. FEELING NERVOUS, ANXIOUS, OR ON EDGE: SEVERAL DAYS
6. BECOMING EASILY ANNOYED OR IRRITABLE: MORE THAN HALF THE DAYS

## 2019-08-29 ASSESSMENT — PATIENT HEALTH QUESTIONNAIRE - PHQ9: SUM OF ALL RESPONSES TO PHQ QUESTIONS 1-9: 10

## 2019-08-29 NOTE — PROGRESS NOTES
[facility]   Group Progress Note    Date of Service: 8/29/19  Client Name: Shawn Camejo  YOB: 1980  MRN:  [id number]  Treating Provider: Venita Lares, PhD    Type of Session: Group  Number of Minutes: 120    Health Maintenance Summary - Mental Health Treatment Plan       Status Date      MENTAL HEALTH TX PLAN Overdue 8/28/2019      Done 9/28/2018         Current Symptoms/Status:  The client meets criteria for Other Specified Disruptive, Impulsive-Control, and Conduct Disorder (hypersexuality), which includes behavioral symptoms that cause clinically significant distress and impair social functioning. He has continued to struggle staying within boundaries but he also is unsure of what boundaries to set for himself.     The client meets criteria for Major Depressive Disorder, Recurrent Episode, Mild, which includes experiencing anhedonia, worthlessness, hypersomnia, fatigue, and a depressed mood more days than not for a period of at least two weeks.  Symptoms persist.     Client endorses the following anxiety symptoms: difficulty controlling worry; restlessness or feeling keyed up or on edge; being easily fatigued; difficulty concentrating or mind going blank; irritability; muscle tension; and sleep disturbance (difficulty falling or staying asleep, or restless unsatisfying sleep).  Symptoms persist.     The client meets criteria for Posttraumatic Stress Disorder, which includes dissociative reactions, persistent avoidance of distressing memories, negative alternations in cognitions and mood associated with the event, and marked alterations in arousal and reactivity associated with the event. The symptoms have been present for at least one month and cause clinically significant distress or impairment.  Symptoms persist.    CSB: Not staying within boundaries.    Alcohol Use Disorder:  Didn't drink.  Went  to two AA groups and his [facility] group.    Progress Toward Treatment Goals:   The  client reported difficulties with mood and struggling with avoidance. Has started day treatment.      Intervention: Modality and Response:  Utilized supportive and CBT techniques were used to address CSB and related mental health issues.      STill struggling with boundaries.  But staying in more boundaries and feeling good as a result.  Got back on his medications.  Was not able to employ safety plans.  Started MICD and feel that is helping.  Getting good feedback.     Assignment:  Stay within defined boundaries.    Interactive Complexity:  Communication difficulties present during current the procedure included the need to manage maladaptive communication related to high anxiety, high reactivity, repeated questions, and resistance that complicated delivery of care.      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      Plan / Need for Future Services:  Return for group therapy on weekly basis.  Continue seeing Dr. Bravo 2X a month jeremy Lares, PhD

## 2019-08-30 ASSESSMENT — ANXIETY QUESTIONNAIRES: GAD7 TOTAL SCORE: 11

## 2019-09-03 ENCOUNTER — OFFICE VISIT (OUTPATIENT)
Dept: OTHER | Facility: OUTPATIENT CENTER | Age: 39
End: 2019-09-03
Payer: COMMERCIAL

## 2019-09-03 ENCOUNTER — HOSPITAL ENCOUNTER (OUTPATIENT)
Dept: BEHAVIORAL HEALTH | Facility: CLINIC | Age: 39
End: 2019-09-03
Attending: SOCIAL WORKER
Payer: COMMERCIAL

## 2019-09-03 ENCOUNTER — BEH TREATMENT PLAN (OUTPATIENT)
Dept: BEHAVIORAL HEALTH | Facility: CLINIC | Age: 39
End: 2019-09-03
Attending: FAMILY MEDICINE

## 2019-09-03 DIAGNOSIS — F91.8 OTHER CONDUCT DISORDERS: Primary | ICD-10-CM

## 2019-09-03 DIAGNOSIS — F33.0 MAJOR DEPRESSIVE DISORDER, RECURRENT EPISODE, MILD (H): ICD-10-CM

## 2019-09-03 DIAGNOSIS — F10.21 ALCOHOL USE DISORDER, MODERATE, IN EARLY REMISSION, DEPENDENCE (H): ICD-10-CM

## 2019-09-03 DIAGNOSIS — F19.20 CHEMICAL DEPENDENCY (H): ICD-10-CM

## 2019-09-03 DIAGNOSIS — F41.1 GENERALIZED ANXIETY DISORDER: ICD-10-CM

## 2019-09-03 DIAGNOSIS — F43.10 POSTTRAUMATIC STRESS DISORDER: ICD-10-CM

## 2019-09-03 PROCEDURE — H2035 A/D TX PROGRAM, PER HOUR: HCPCS | Mod: HQ

## 2019-09-03 NOTE — PROGRESS NOTES
[facility]  Adult Chemical Dependency Program  Treatment Plan Requirements    These services are provided by the facility for each patient/client according to the individual's treatment plan:    Individual and group counseling    Education    Transition services    Services to address any co-occurring mental illness    Service coordination    Initial Treatment Plan Goals:  1. Complete all the requirements of Program Orientation.  2. Maintain medication compliance throughout the program.  3. Complete requirements for workshop/skills groups based on identified issues on your problem list.  4. Complete the support group attendance feedback sheet weekly.  5. Gain family involvement in treatment process to address family issues from the problem list.  6. Attend and participate in all required groups per individual treatment plan.  7. Focus attention to individualized issues from the treatment plan.  8. Complete all requirements for UA's, alcohol screening tests and other testing.  9. Schedule a physical examination if recommended.    In addition to the above, complete all individual goals as specifically outlines on your treatment plan.    Criteria for discharge:  Patients/clients are discharged from the program following completion of the entire program including Phase I and II or acceptance of other post-treatment referrals such as [facility] house, or aftercare at other facilities.  Patients/clients may also be discharged for inappropriate behavior or chemical use.      Favorable Discharge - Patients/clients have completed agreed upon treatment goals, understand their diagnosis and appear motivated about the follow-up care.    Guarded Discharge - Patients/clients have demonstrated some understanding of their diagnosis and recovery process, and have completed some of their treatment goals.  This prognosis also includes patients/clients who have completed some treatment goals but have not made  commitment to community support or follow through with referrals.    Unfavorable Discharge - Patients/clients have not completed agreed upon treatment goals due to their own choice, have limited understanding of their diagnosis, and have shown minimal or inconsistent behavior conducive to recovery.  Those patients/clients discharged due to behavioral problems will also be unfavorable discharges.    Adult CD Treatment Plan                                Shawn Camejo  [id number]  1980  39 year old   Male  Acute Intoxication/Withdrawal Potential     DIMENSION 1  RISK FACTOR: 0     SUBSTANCE USE DISORDERS:    303.90 (F10.20) Alcohol Use Disorder Moderate           Date Assigned Source Area of Treatment Focus / Goal / Treatment Strategies    Target  Date Initials Outcome Date Completed   9/3/2019 Self -  Current and Assessment -  Current  Area of Treatment Focus:  Client reports last use episode 1/08/2020; client reports using every 1-2 weeks..       Goal:  Develop effective strategies to maintain sobriety.    Treatment Strategies:   (Note: all treatment strategies are a one time in frequency unless specifically noted)  1. Report to the counselor and to the group any alcohol and/or drug use throughout treatment.  2. Client agrees to advise his counselor of any changes in his medications, or dosages of medications while in treatment.    UPDATE 1/14/2020:   3. Client will document pattern of use of alcohol for previous  episodes of use.                  Weekly, until approx. D/C date of   12/30/19        DUE: 1/23/20 RUTH   Update:KD Not Complete Incomplete due to client discharging himself from program against staff advice       Biomedical Conditions and Complaints     DIMENSION 2  RISK FACTOR: 0             Date Assigned Source Area of Treatment Focus / Goal / Treatment Strategies Target  Date Initials Outcome Date Completed   9/3/2019 Self -  Deferred and Assessment -  Deferred  Area of Treatment Focus:  Client  has not had a medical evaluation in the past year.    Goal:  Obtain a physical evaluation.    Treatment Strategies:  (Note: all treatment strategies are a one time in frequency unless specifically noted)  1. Obtain a PCP (Primary Care Provider).  2. Disclose CD status to medical providers and follow up with medical interventions while in DOP.  3. Client will effectively manage his physical health and will follow recommendations of your medical provider.     Update             1: 09/25/19    Weekly, until approx. D/C date of   12/30/19 RUTH   Update KD: 1/14/2020 Not Complete Incomplete due to client discharging himself from program against staff advice       Emotional/Behavioral/Cognitive Conditions and Complications     DIMENSION 3  RISK FACTOR: 2             Date Assigned Source Area of Treatment Focus / Goal / Treatment Strategies Target  Date Initials Outcome Date Completed     9/3/2019 Self -  Referred and Assessment -  Referred/current  Area of Treatment Focus:   Client has the following mental health diagnoses that was diagnosed by [facility] Psychologist Lux Bravo, PhD on 08/20/19;  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      Treatment Strategies:  (Note: all treatment strategies are a one time in frequency unless specifically noted)  1. Client will identify 3 stressors that challenge daily focus on recovery. .   2. Client will identify a plan for each of the 3 stressors that will help reduce symptoms of anxiety and distress.  3. Client will set timeline for implementing each plan.  "  4. Client will report stressors, plan, timeline, and progress to counselor.   5. Client will complete the following assignment packets:  A. Locus of Control  B. Distress Tolerance  C. Cognitive Distortions  D. Stinking Thinking  E. Step One: Honesty  F. REBT focus on Shame  G. REBT focus on Perfectionism                                       2020 KD Not Complete Incomplete due to client discharging himself from program against staff advice        Readiness to Change     DIMENSION 4  RISK FACTOR: 2             Date Assigned Source Area of Treatment Focus / Goal / Treatment Strategies Target  Date Initials Outcome Date Completed   9/3/2019 Self -  Current and Assessment -  Current  Area of Treatment Focus:  Client has a history of relapse and a lack of supportive friendships.     Goal:  Increase awareness with how substance use is conflicted with personal values and address any ambivalence to change.       Treatment Strategies:  (Note: all treatment strategies are a one time in frequency unless specifically noted)  1. Client will complete (1x) \"What would my ideal life look like?\" assignment and share with the group.   Revised: Client will complete assignment \"What my life will look like after one year of abstinence\" - completing either as one-page narrative or as visual collage. Client will share completed assignment with counselor on 2020.    Area of Treatment Focus:  Client demonstrates continued ambivalence for change.     Goal:  Client will increase motivation for sobriety and recovery lifestyle.    Treatment Strategies:  Client will complete assignment packets on the followin. Responsibility for My  Recovery  2. Self-Sabotaging Behaviors                                 2020 RUTH   Update: KD  2020                   Not Complete Incomplete due to client discharging himself from program against staff advice      Relapse/Continues Use/Continues Problem Potential     DIMENSION 5  RISK " FACTOR: 3                 Date Assigned Source Area of Treatment Focus / Goal / Treatment Strategies Target  Date Initials Outcome Date Completed   9/3/2019 Self -  Current and Assessment -  Current  Area of Treatment Focus:  Client lacks insight into his relapse process along with early warning signs and triggers.      Goal:  Increase awareness of the disease concept of addiction and insight into personal relapse process, triggers and strategies to address.      Treatment Strategies:  (Note: all treatment strategies are a one time in frequency unless specifically noted)  1. Complete the following assignment packets:. Client will share assignments and how relates to his experience with counselor.  A. Intent vs. Reality  B. Post-Acute Withdrawal Symptoms  C. 37 Phases of Relapse    2. Client will attend 2 sober support meetings per week and report attendance to counselor. In Phase 3 of program, client will attend 3 meetings per week, and report attendance to counselor.                                      RUTH   Updated kd: 1/14/2020 Not Complete Incomplete due to client discharging himself from program against staff advice        Recovery Environment     DIMENSION 6  RISK FACTOR: 2                 Date Assigned Source Area of Treatment Focus / Goal / Treatment Strategies Target  Date Initials Outcome Date Completed   9/3/2019 Self -  Current and Assessment -  Current  Area of Treatment Focus:  Client lacks stable sober and supportive housing.      Goal:  Client identified a goal Client reported current sober housing is not supportive to his recovery; reported goal to locate and move to effective sober housing..    Treatment Strategies:  1. Client will locate and move to more effective and supportive sober housing.             2/14/2020 RUTH   Updated KD: 1/14/2020 Not Complete Incomplete due to client discharging himself from program against staff advice     Individual abuse prevention plan (required for lodging plus) :  "specific actions, referral:   No additional protection measures required other than the Program Abuse Prevention Plan - No    All interventions that are designated as \"current\" will need to be completed in order to transition out of treatment with a favorable prognosis. The treatment plan is a flexible document and a work in progress. Interventions and goals may be added at any time to customize the treatment plan to each individuals needs. Client may work with therapist/counselor to change interventions as long as they pertain to the goals stipulated in the plan and/or are clinically driven.  Lucille Castellanos MFA, MA, [facility]   January 14, 2020 Update of Previous Treatment Plan.    Acknowledgement of Current Treatment Plan - Initial Treatment Plan     INITIAL TREATMENT PLAN:     1. I have participated in creating my treatment plan with my therapist / counselor on 1/14/2020.     I agree with the plan as it is written in the electronic health record.    Name Signature/Date   Client: Shawn Camejo     Name of Therapist / Counselor Signature/Date   Counselor/Therapist:    Lucille Castellanos MFA, MA, [facility]      2. I have completed and reviewed my Safety Plan with my counselor and signed this on __08/27/19__. I have been given the hard copy of this plan.    3. Treatment Plan updated and revised with client collaboration on January 14, 2020.     Client signature/date:      ___________________________________________________________________1/14/2020    3. Last Use Date:   Revised Last Date of Use: 1/08/2020    Client signature/date:     ___________________________________________________________________1/14/2020    "

## 2019-09-03 NOTE — PROGRESS NOTES
[facility] -  Case Progress Note    Date of Service: 9/03/19  Client Name: Shawn Camejo  YOB: 1980  MRN:  [id number]  Treating Provider: Lux Bravo, PhD Postdoctoral Fellow  Type of Session: Individual  Present in Session: Patient only  Number of Minutes:  53    Health Maintenance Summary - Mental Health Treatment Plan       Status Date      MENTAL HEALTH TX PLAN Overdue 8/28/2019      Done 9/28/2018         Current Symptoms/Status:  The client meets criteria for Other Specified Disruptive, Impulsive-Control, and Conduct Disorder (hypersexuality), which includes behavioral symptoms that cause clinically significant distress and impair social functioning. There appears to be evidence that client's behaviors are sexually compulsive in nature.     The client meets criteria for Major Depressive Disorder, Recurrent Episode, Mild, which includes experiencing anhedonia, worthlessness, hypersomnia, fatigue, and a depressed mood more days than not for a period of at least two weeks.    Client endorses the following anxiety symptoms: difficulty controlling worry; restlessness or feeling keyed up or on edge; being easily fatigued; difficulty concentrating or mind going blank; irritability; muscle tension; and sleep disturbance (difficulty falling or staying asleep, or restless unsatisfying sleep).    The client meets criteria for Posttraumatic Stress Disorder, which includes dissociative reactions, persistent avoidance of distressing memories, negative alternations in cognitions and mood associated with the event, and marked alterations in arousal and reactivity associated with the event. The symptoms have been present for at least one month and cause clinically significant distress or impairment.    The client meets criteria for Alcohol Use Disorder (Moderate), which includes consuming alcohol in larger amounts than intended, giving up social/recreational activities due to use, continued  "use despite negative consequences (eg, CSB), and unsuccessful efforts to cut down alcohol use.      Progress Toward Treatment Goals:   The client continues to show interest and commitment towards receiving services at [facility].  The client joined CSB group 4/4/19.      Intervention: Modality and Response:  Client fully oriented to person, place, and time. Therapist employed client-centered and CBT interventions to explore client's compulsive sexual behaviors and ongoing mental health concerns. Cl reported avoiding relapses over the last week, and reported medication compliance. He believes Naltrexone is helping curb his urges to view pornography, and reported feeling \"disgusted\" when he thought about acting out since maintaining Naltrexone adherence. We discussed antecedents and core beliefs related to CSB, and he identified a core belief related to not feeling like he \"deserves\" to be happy, which is activated when he receives his biweekly paycheck. Therapist encouraged cl to objectively view these beliefs, and he identified therapy groups and 12-step groups as source of support. He was open and receptive to feedback.      Assignment:  -Continue attending CSB group.  -Continue reflecting on family relationships.      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     Lux Bravo, PhD  Postdoctoral Fellow  "

## 2019-09-04 ENCOUNTER — HOSPITAL ENCOUNTER (OUTPATIENT)
Dept: BEHAVIORAL HEALTH | Facility: CLINIC | Age: 39
End: 2019-09-04
Attending: SOCIAL WORKER
Payer: COMMERCIAL

## 2019-09-04 ENCOUNTER — OFFICE VISIT (OUTPATIENT)
Dept: OTHER | Facility: OUTPATIENT CENTER | Age: 39
End: 2019-09-04
Payer: COMMERCIAL

## 2019-09-04 DIAGNOSIS — F91.8 OTHER CONDUCT DISORDERS: Primary | ICD-10-CM

## 2019-09-04 DIAGNOSIS — F10.21 ALCOHOL USE DISORDER, MODERATE, IN EARLY REMISSION, DEPENDENCE (H): ICD-10-CM

## 2019-09-04 DIAGNOSIS — F33.0 MAJOR DEPRESSIVE DISORDER, RECURRENT EPISODE, MILD (H): ICD-10-CM

## 2019-09-04 DIAGNOSIS — F41.1 GENERALIZED ANXIETY DISORDER: ICD-10-CM

## 2019-09-04 PROCEDURE — H2035 A/D TX PROGRAM, PER HOUR: HCPCS | Mod: HQ

## 2019-09-04 NOTE — PROGRESS NOTES
Shawn RODRIGUEZ Bashir  [id number]               Adult CD Progress Note and Treatment Plan Review     Attendance    >Note: No group on Monday 09/02/19 as clinic was closed due to Labor Day Holiday.                                  Tuesday                   Group Date: 9/03/2019   Group Attendance Attended group session   Group Therapy Type Addiction, Life skill(s) and Psychoeducation   Group Topic Covered Balanced Lifestyle, Co-occurring Illness, Coping Skills/Lifestyle Management, Disease of Addiction/Choices in Recovery, Meditation/Breathing Exercises, Mental Health Skills, Mindfulness and Neurobiology of Addiction.   Client's Response To Group Topic Cooperative with task. Discussed personal experience with topic. Expressed readiness to alter behaviors. Expressed understanding of topic. Listened actively.   Client Group Participation Detail Adequate participation and highly engaged.   Group Attendance (Time) 3.0 Hours   Individual Attendance None   Family Attendance None   Other Comments/Information Met with client and made clients treatment plan.                                 Wednesday                                     Group Date: 9/04/2019   Group Attendance Attended group session   Group Therapy Type Addiction, Life skill(s) and Psychoeducation   Group Topic Covered Cognitive Behavioral Therapy, Coping Skills/Lifestyle Management, Disease of Addiction/Choices in Recovery, Mindfulness, Cognitive distortions   Client's Response To Group Topic Cooperative with task. Discussed personal experience with topic. Expressed readiness to alter behaviors. Expressed understanding of topic. Listened actively.   Client Group Participation Detail Highly engaged   Group Attendance (Time) 3.0 Hours   Individual Attendance None   Family Attendance None   Other Comments/Information None      Total # of Phase 1 Group Sessions: 4     Total # of Phase 2 Group Sessions:   Total # of Phase 3 Group Sessions:   Total # of 1:1 Sessions:  1    Support group attended this week: yes    Reporting sobriety: Yes - Last reported use date for alcohol and marijuana; 07/27/19    Treatment Plan Review     Treatment Plan Review completed on:  9/04/2019     Projected discharge date: 12/18/19    Client preferred learning style: Visual  Hands on  Verbal  Demonstration    Staff member(s) contributing: Holger Braga, HARPREET, [facility] & Lucille Castellanos MFA, MA, [facility]    Received supervision: Yes (explain) - With JOSESITO Morocho, [facility], [facility].    Client involvement with treatment planning: Contributed to goals and plan.    Client received copy of plan/revised plan: Yes    Client agrees with plan/revised plan: Yes    Changes to Treatment Plan: No     Client's Dimension 1 Goal(s) Develop effective strategies to maintain sobriety.   Goal not addressed this week.   Client's Dimension 2 Goal(s) Obtain a physical evaluation. Goal not addressed this week.   Client's Dimension 3 Goal(s) Client will learn and utilize coping skills learned in the program to manage symptoms of depression and anxiety more effectively, reducing his PHQ9 score from 10 <6. See below.   Client's Dimension 4 Goal(s) Increase awareness with how substance use is conflicted with personal values and address any ambivalence to change.  Goal not addressed this week.   Client's Dimension 5 Goal(s) Increase awareness of the disease concept of addiction and insight into personal relapse process, triggers and strategies to address. See below.   Client's Dimension 6 Goal(s) Expand sober support network and be involved in sober activities. Goal not addressed this week.     New Goals added since last review: None.    Goal(s) worked on since last review:   Dimension 3 & Dimension 5: Client practiced the following skills in group session(s) this week; mindfulness based stress reduction skills, thought-change strategies to increase distress tolerance. Client learned about the following topics in group this week:  Balanced Lifestyle, Co-occurring Illness, Coping Skills/Lifestyle Management, Disease of Addiction/Choices in Recovery, Meditation/Breathing Exercises, Mental Health Skills, Mindfulness and Neurobiology of Addiction. (See DIAP below for additional information)    Strategies effective: Yes    Treatment Coordination Activities: Yes - With JOSESITO Morocho, [facility], [facility] & Lucille Castellanos MFA, MA, [facility].    Medical, Mental Health and other appointments the client attended: Yes - Client sees his psychologist and psychiatirst at [facility] regular[facility] as [facility] of [facility].     Medication issues: None.  Current Outpatient Medications   Medication     naltrexone (DEPADE/REVIA) 50 MG tablet     sertraline (ZOLOFT) 100 MG tablet     No current facility-administered medications for this encounter.      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     Substance Use Disorders:    303.90 (F10.20) Alcohol Use Disorder Moderate    ASAM Risk Rating: (Note the rationale for risk rating changes)    Dimension 1 0 No Change    Dimension 2 0 No Change    Dimension 3 2 No Change    Dimension 4 2 No Change    Dimension 5 3 No Change      Dimension 6 2 No Change    Data: (Need to include short narrative for the week on what was worked on)  offered feedback good insight client did actively participate  Tuesday & Wednesday: Client learned and practiced the following skills in group  "session(s) this week; Balanced Lifestyle, Co-occurring Illness, Cognitive Distortions, CBT Techniques, Coping Skills/Lifestyle Management, Disease of Addiction/Choices in Recovery, Meditation/Breathing Exercises, Mental Health Skills, Mindfulness and Neurobiology of Addiction.     Intervention: Counselor/therapist used Cognitive Behavioral Therapy, ACT, Counselor Feedback, Education, Emotional management, Group Feedback, Mindfulness, Motivational Enhancement Therapy, Relapse Prevention, Twelve Step Facilitation, DBT and REBT.  > Client practiced the following skills in group session(s) this week; mindfulness based stress reduction skills, along with breathing exercises. Writer provided client with verbal interventions including: validation, support, encouragement, and psycho-education.    Mental Health Skills & Neurobiology Group #2:    > Client also learned and practiced the following skills in group sessions this week; Neurobiology of addiction and how the brain changes with addiction. Client also learned how the brain is impacted with chemical use and how this impacts his recovery. Client learned about \"How addiction hijacks the brain\" the numbed pleasure response, the CREB molecule, and how addictive chemicals stimulate the nucleus accumbens. Client learned about strengthened associations between drug taking and drug-related cues. Client learned about the VTA (Ventral Tegmental Area) of the brain and how when the VTA neurons fire, how they release the neurotransmitter called dopamine and how that plays a major role in addiction. Client learned about how certain environmental cures become very strongly associated with the use of the drug and turn into triggers that lead to cravings and continued use. Client also learned about \"Reduced Self-Control\". Client learned that the third type of brain change that happens in addiction is reduced self-control as a result of weaker inhibitory control from the prefrontal " "cortex. Client learned how the chronic use of addictive drugs can lead to abnormalities in the prefrontal cortex that undermine ones ability to exhibit this kind of self-control. Client learned that when the pre-frontal cortex is compromised with addiction that it becomes harder to overcome the increasingly powerful urges coming from the reward circuit which essentially makes the addicts ability to exhibit self-control and override drug craving becomes weaker and weaker.     Client learned about \"The Genetics of Addiction\" and the roll genetics play with addiction as well as mental health. Client learned about the DNA, breakdown of genes, chromosomes, nucleotides and SNP's (single nucleotide polymorphisms) which all play a big role in genetic association studies. Client about the proteins involved with addiction and how neurotransmitters communicate between  The brain cells. Client learned about promising research into the targeting of genes associated with addiction and how the identification of these genes can make biologically based treatments be more effective at treating addiction overall. Lastly client learned about future treatments could be tailored to the specific genetic profile of a particular individual with could result in better treatment outcomes.      Client learned about the \"Brain on Drugs\". Client learned the psychological effects of psychoactive drugs. Client learned about Neurons, some neurotransmitters (dopamine, serotonin and acetylcholine), synapses, receptors, binding, antagonists, and down-regulating receptors. Client learned about how psychoactive drugs produce psychological effects and how psychoactive drugs imitate natural brain chemicals. Client learned how the chemicals can produce different or stronger effects than the natural neurotransmitters due to how a drug can activate the receptor more or less strongly than the natural neurotransmitter. Client learned about the strengh of a " "drugs effects and the blood brain barrier. Client learned the process of how the neurotransmitter or a psychoactive drug binds to a receptor for only an instant (reversibility) to turn on the receptor; and then unbind, or dissociate, from the receptor, turning the receptor off. Then the cycle can continue repeating. Client learned how this process is random, or stochastic. Client learned about drug affinity for receptors. Client learned about how larger doses of drugs produce more molecules in the synapse thus causing a larger effect. Client learned about how some drugs have a stronger psychological effect due to the drugs affinity and in the amount of intrinsic activity that they produce.Client then learned about the differences between agonists and antagonists. Client then learned about how our brains and bodies build tolerance to a specific drug/chemical. Client learned about how the brain changes during this period and how over time, the brain will often reduce the number of receptors that the drug is binding to (receptor down-regulation). Naturally, if there are fewer receptors, then the same amount of drug will produce a smaller effect (tolerance). Client learned how this \"receptor down-regulation is what causes a lot of the withdrawal symptomology or abstinence syndrome. Client then learned about how withdrawal symptoms are the mirror image of the effects that the drug itself produces. Client learned that when tolerance has developed that withdrawal would also be present if the drug/chemical is stopped which could manifest in physical and psychological symptoms.     Client developed a Mental Health Maintenance Plan from the information he learned from the Mental Health Skills Group and Neurobiology of Addiction Group. Client identified four triggers that put his mental health at risk, four warning signs that would indicate his mental health might be at risk. Client then identified three things he will do " "regularly for self-care, three coping strategies that he would employee on a regular basis and identified how he would know if he needed psychiatric assistance, crisis assistance, and/or additional therapy.     Assessment:    > Client was observed using the body scan and reported it helped with relaxation and anxiety. Client was observed using breathing exercises that were covered again during this weeks group sessions and client reported it helped with relaxation. Client appeared very open to continued learning for lifelong sobriety.    > Client was able to \"teach-back\" the skills he learned on the Neurobiology of Addiction and Mental Health Skills Group.  Client appeared to gain insight into the importance of having a structured and detailed Mental Health Maintenance Plan and was able to identify different copings strategies, relaxation techniques and things he would do ongoing for self-care.  Client was able to \"teach-back\" what he learned in the aforementioned groups on Neurobiology of Addiction and Mental Health Skills Group;  1. Client was able to characterize the positive and negative reinforcement models of addictions, identify some of the major neural changes that occur with chronic drug use that contributes to addiction, and how addiction is a disease versus a moral failure.    2. Client was able to talk about some of the evidence that certain people are innately more susceptible to addiction than others as well as how geneticists go about finding genes that contribute to addiction and mental health susceptibility. Client was able to reconcile the neural and genetic findings on addiction with the idea that drug users have free will.   3. Client was able to explain how a lock-and-key analogy can be used to explain how psychoactive drugs have their effects on human beings. More specifically the analogy can be used to explain the difference between a drug's potency and its efficacy and what factors influence " each other. Client was able to explain how the same dose of a drug produces less and less of an effect over time due to the human bodies tolerance.     > Client Appears/Sounds: Cooperative, Motivated, and Engaged.    > Client appears to be in the Action Stage within the Stages of Change Model.     Plan:   -Attend 2-3 sober support group meetings each week (this includes non-12-step/AA alternative meetings).  -Client will meet with his therapist once per week.  -Client will continue to work on treatment plan objectives.  -Client will follow all recommendations of counselor and any other medical provider which includes taking all medications as prescribed.    -Client will attend all weekly Phase 1 group sessions.   -Client to engage in sober activities each week.    Holger Braga MA, LMFT, [facility]  Licensed Psychotherapist

## 2019-09-04 NOTE — PROGRESS NOTES
[facility]   Group Progress Note    Date of Service: 9/04/19  Client Name: Shawn Camejo  YOB: 1980  MRN:  [id number]  Treating Provider: Venita Lares, PhD    Type of Session: Group  Number of Minutes: 120    Health Maintenance Summary - Mental Health Treatment Plan       Status Date      MENTAL HEALTH TX PLAN Overdue 8/28/2019      Done 9/28/2018         Current Symptoms/Status:  The client meets criteria for Other Specified Disruptive, Impulsive-Control, and Conduct Disorder (hypersexuality), which includes behavioral symptoms that cause clinically significant distress and impair social functioning. He has continued to struggle staying within boundaries but he also is unsure of what boundaries to set for himself.     The client meets criteria for Major Depressive Disorder, Recurrent Episode, Mild, which includes experiencing anhedonia, worthlessness, hypersomnia, fatigue, and a depressed mood more days than not for a period of at least two weeks.  Symptoms persist.     Client endorses the following anxiety symptoms: difficulty controlling worry; restlessness or feeling keyed up or on edge; being easily fatigued; difficulty concentrating or mind going blank; irritability; muscle tension; and sleep disturbance (difficulty falling or staying asleep, or restless unsatisfying sleep).  Symptoms persist.     The client meets criteria for Posttraumatic Stress Disorder, which includes dissociative reactions, persistent avoidance of distressing memories, negative alternations in cognitions and mood associated with the event, and marked alterations in arousal and reactivity associated with the event. The symptoms have been present for at least one month and cause clinically significant distress or impairment.  Symptoms persist.    CSB: Stayed within most of his boundaries.  Much improved.    Alcohol Use Disorder:  Didn't drink.  Went  to two AA groups and his [facility] group.    Progress Toward  Treatment Goals:   The client reported difficulties with mood and struggling with avoidance. Has started day treatment.      Intervention: Modality and Response:  Utilized supportive and CBT techniques were used to address CSB and related mental health issues.      Maintaining better boundaries.  Feeling better as a result.  Feels that the medications are helping him with mood, focus.  Still having urges but feeling more control over his behavior.  Started MICD and feel that is helping.  Group gave supportive feedback.     Assignment:  Stay within defined boundaries.    Interactive Complexity:  Communication difficulties present during current the procedure included the need to manage maladaptive communication related to high anxiety, high reactivity, repeated questions, and resistance that complicated delivery of care.      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      Plan / Need for Future Services:  Return for group therapy on weekly basis.  Continue seeing Dr. Bravo 2X a month jeremy Lares, PhD

## 2019-09-05 NOTE — PROGRESS NOTES
Patient Safety Plan Template    Name:   Shawn Camejo YOB: 1980 Age:  39 year old MR Number:  [id number]   Step 1: Warning signs (Thoughts, images, mood, situation, behavior) that a crisis may be developin. Wanting to go to bars or casinos.     2. Being isolated for a long period of time.     3. Feeling depressed or hopeless along with rumination.     Step 2: Internal coping strategies - Things I can do to take my mind off of my problems without contacting another person (relaxation technique, physical activity):     1. Exercise     2. Play Music     3. Sleep     Step 3: People and social settings that provide distraction:     1. Name: Ned Lottn   Phone: [phone]   2. Name: Peyman Arun   Phone: [phone]   3. Place: Work   4. Place:  Meetings     The one thing that is most important to me and worth living for is: My family and friends.     Step 4: People whom I can ask for help:     1. Name: Matthew   Phone: [phone]     2. Name: Oswaldo   Phone: [phone]     3. Name: Juan Miguel   Phone: [phone]     Step 5: Professionals or agencies I can contact during a crisis:     1. Clinician Name: None   Phone: None   Clinician Pager or Emergency Contact #: None     2. Clinician Name: None   Phone: None     Clinician Pager or Emergency Contact #: None     3. Local Urgent Care Services: [facility]    Urgent Care Services Address: [address] [place] [address]    Urgent Care Services Phone: [phone]     4. Suicide Prevention Lifeline Phone: 6-535-995-QRCI ([phone])     Step 6: Making the environment safe:     1. Make sure no alcohol or drugs are around.     2. Only surround myself with healthy people.      Safety Plan Template 2008 Jessy Gaitan and Ilya Jean is reprinted with the express permission of the authors.  No portion of the Safety Plan Template may be reproduced without the express, written permission.  You can contact the authors at  [email or web address] or [email or web address].

## 2019-09-05 NOTE — ADDENDUM NOTE
Encounter addended by: Holger Braga LMFT, [facility] on: 9/5/2019 11:46 AM   Actions taken: Sign clinical note

## 2019-09-05 NOTE — ADDENDUM NOTE
Encounter addended by: Holger Braga LMFT, [facility] on: 9/5/2019 11:21 AM   Actions taken: Sign clinical note
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
## 2019-09-10 ENCOUNTER — HOSPITAL ENCOUNTER (OUTPATIENT)
Dept: BEHAVIORAL HEALTH | Facility: CLINIC | Age: 39
End: 2019-09-10
Attending: SOCIAL WORKER
Payer: COMMERCIAL

## 2019-09-10 ENCOUNTER — OFFICE VISIT (OUTPATIENT)
Dept: OTHER | Facility: OUTPATIENT CENTER | Age: 39
End: 2019-09-10
Payer: COMMERCIAL

## 2019-09-10 DIAGNOSIS — F33.0 MAJOR DEPRESSIVE DISORDER, RECURRENT EPISODE, MILD (H): ICD-10-CM

## 2019-09-10 DIAGNOSIS — F43.10 POSTTRAUMATIC STRESS DISORDER: ICD-10-CM

## 2019-09-10 DIAGNOSIS — F10.21 ALCOHOL USE DISORDER, MODERATE, IN EARLY REMISSION, DEPENDENCE (H): ICD-10-CM

## 2019-09-10 DIAGNOSIS — F91.8 OTHER CONDUCT DISORDERS: Primary | ICD-10-CM

## 2019-09-10 DIAGNOSIS — F41.1 GENERALIZED ANXIETY DISORDER: ICD-10-CM
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
## 2019-09-11 ENCOUNTER — HOSPITAL ENCOUNTER (OUTPATIENT)
Dept: BEHAVIORAL HEALTH | Facility: CLINIC | Age: 39
End: 2019-09-11
Attending: SOCIAL WORKER
Payer: COMMERCIAL

## 2019-09-11 ENCOUNTER — OFFICE VISIT (OUTPATIENT)
Dept: OTHER | Facility: OUTPATIENT CENTER | Age: 39
End: 2019-09-11
Payer: COMMERCIAL

## 2019-09-11 DIAGNOSIS — F91.8 OTHER CONDUCT DISORDERS: Primary | ICD-10-CM

## 2019-09-11 DIAGNOSIS — F10.21 ALCOHOL USE DISORDER, MODERATE, IN EARLY REMISSION, DEPENDENCE (H): ICD-10-CM

## 2019-09-11 DIAGNOSIS — F33.0 MAJOR DEPRESSIVE DISORDER, RECURRENT EPISODE, MILD (H): ICD-10-CM

## 2019-09-11 DIAGNOSIS — F41.1 GENERALIZED ANXIETY DISORDER: ICD-10-CM

## 2019-09-11 PROBLEM — F19.20 CHEMICAL DEPENDENCY (H): Status: ACTIVE | Noted: 2019-09-11

## 2019-09-11 PROCEDURE — H2035 A/D TX PROGRAM, PER HOUR: HCPCS | Mod: HQ

## 2019-09-11 ASSESSMENT — ANXIETY QUESTIONNAIRES
1. FEELING NERVOUS, ANXIOUS, OR ON EDGE: SEVERAL DAYS
6. BECOMING EASILY ANNOYED OR IRRITABLE: MORE THAN HALF THE DAYS
3. WORRYING TOO MUCH ABOUT DIFFERENT THINGS: SEVERAL DAYS
7. FEELING AFRAID AS IF SOMETHING AWFUL MIGHT HAPPEN: MORE THAN HALF THE DAYS
IF YOU CHECKED OFF ANY PROBLEMS ON THIS QUESTIONNAIRE, HOW DIFFICULT HAVE THESE PROBLEMS MADE IT FOR YOU TO DO YOUR WORK, TAKE CARE OF THINGS AT HOME, OR GET ALONG WITH OTHER PEOPLE: SOMEWHAT DIFFICULT
4. TROUBLE RELAXING: MORE THAN HALF THE DAYS
2. NOT BEING ABLE TO STOP OR CONTROL WORRYING: SEVERAL DAYS
5. BEING SO RESTLESS THAT IT IS HARD TO SIT STILL: SEVERAL DAYS
GAD7 TOTAL SCORE: 10

## 2019-09-11 ASSESSMENT — PATIENT HEALTH QUESTIONNAIRE - PHQ9: SUM OF ALL RESPONSES TO PHQ QUESTIONS 1-9: 9

## 2019-09-11 NOTE — PROGRESS NOTES
I did not personally see the patient. I reviewed and agree with the assessment and plan of this note.     Leonela Salter, PhD, LMFT

## 2019-09-11 NOTE — PROGRESS NOTES
Shawn Camejo  [id number]               Adult CD Progress Note and Treatment Plan Review     Attendance     Monday    Group Date: 9/09/2019   Group Attendance Unexcused absence   Group Therapy Type Absent from group   Group Topic Covered Absent from group   Client's Response To Group Topic Absent from group.   Client Group Participation Detail Absent from group   Group Attendance (Time) None - Absent from group   Individual Attendance None   Family Attendance None   Other Comments/Information Client was absent from today's group and didn't call or show.       Tuesday     Group Date: 9/10/2019   Group Attendance Attended group session   Group Therapy Type Addiction, Life skill(s) and Psychoeducation   Group Topic Covered Boredom, Cognitive Therapy Techniques, Co-occurring Illness, Disease of Addiction/Choices in Recovery, Relapse Prevention, Resentments and Self-Esteem/Self [place]   Client's Response To Group Topic Cooperative with task Expressed understanding of topic Listened actively Offered helpful suggestions to peers   Client Group Participation Detail Highly involved   Group Attendance (Time) 3.0 Hours   Individual Attendance None   Family Attendance None   Other Comments/Information None      Wednesday    Group Date: 9/11/2019   Group Attendance Attended group session   Group Therapy Type Addiction, Life skill(s) and Psychoeducation   Group Topic Covered Boredom, Cognitive Therapy Techniques, Co-occurring Illness, Disease of Addiction/Choices in Recovery, Relapse Prevention, Resentments and Self-Esteem/Self [place]   Client's Response To Group Topic Cooperative with task Discussed personal experience with topic Expressed readiness to alter behaviors Expressed understanding of topic Listened actively Offered helpful suggestions to peers   Client Group Participation Detail Highly involved   Group Attendance (Time) 3.0 Hours   Individual Attendance None   Family Attendance None   Other Comments/Information Gave  client a list of Sober Houses that contain 45+ different sober houses along with their phone numbers. Client was encouraged to start calling asap as it can take time to locate a new sober house.      Total # of Phase 1 Group Sessions: 6     Total # of Phase 2 Group Sessions:   Total # of Phase 3 Group Sessions:   Total # of 1:1 Sessions: 1    Support group attended this week: yes    Reporting sobriety: Yes - Last reported use date for alcohol and marijuana; 07/27/19    Treatment Plan Review     Treatment Plan Review completed on:  9/11/2019     Projected discharge date: 12/18/19    Client preferred learning style: Visual  Hands on  Verbal  Demonstration    Staff member(s) contributing: HARPREET Carmichael, EKTA & Lucille Castellanos MFA, MA, [facility] [place][note 2]    Received supervision: No    Client involvement with treatment planning: Contributed to goals and plan.    Client received copy of plan/revised plan: Yes    Client agrees with plan/revised plan: Yes    Changes to Treatment Plan: No     Client's Dimension 1 Goal(s) Develop effective strategies to maintain sobriety.   Goal not addressed this week.   Client's Dimension 2 Goal(s) Obtain a physical evaluation. Goal not addressed this week.   Client's Dimension 3 Goal(s) Client will learn and utilize coping skills learned in the program to manage symptoms of depression and anxiety more effectively, reducing his PHQ9 score from 10 <6. See below.   Client's Dimension 4 Goal(s) Increase awareness with how substance use is conflicted with personal values and address any ambivalence to change.  Goal not addressed this week.   Client's Dimension 5 Goal(s) Increase awareness of the disease concept of addiction and insight into personal relapse process, triggers and strategies to address. See below.   Client's Dimension 6 Goal(s) Expand sober support network and be involved in sober activities. See below.     New Goals added since last review: None.    Goal(s) worked on since last  review:   Dimension 3 & Dimension 5: Client learned and practiced the following skills in group session(s) this week; Boredom, Cognitive Therapy Techniques, Co-occurring Illness, Disease of Addiction/Choices in Recovery, Relapse Prevention, Resentments and Self-Esteem/Self [place]. (See DIAP below for additional information)  Dimension 6: Client reports having a high level of dissatisfaction with his current sober house. Provided client with a listing of over 45 different sober living houses and encouraged client to start calling ASAP. Also provided client with the [facility] Sober Living Houses which are monitored by the [facility].     Strategies effective: Yes    Treatment Coordination Activities: Yes - Call into [facility] Psychologist & discussed case with Co-counselor Lucille Castellanos MFA, MA, [facility].    Medical, Mental Health and other appointments the client attended: Yes - Client sees his psychologist and psychiatirst at [facility] regularily as part of the [facility].     Medication issues: None.  Current Outpatient Medications   Medication     naltrexone (DEPADE/REVIA) 50 MG tablet     sertraline (ZOLOFT) 100 MG tablet     No current facility-administered medications for this encounter.      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     Substance Use Disorders:    303.90 (F10.20) Alcohol Use Disorder Moderate    ASAM Risk Rating: (Note the rationale for risk rating  "changes)    Dimension 1 0 No Change    Dimension 2 0 No Change    Dimension 3 2 No Change    Dimension 4 2 No Change    Dimension 5 3 No Change      Dimension 6 2 No Change    Data: (Need to include short narrative for the week on what was worked on)  offered feedback good insight client did actively participate  Monday: Unexcused absence.   Tuesday & Wednesday: Client learned and practiced the following skills in group session(s) this week; Boredom, Cognitive Therapy Techniques, Co-occurring Illness, Disease of Addiction/Choices in Recovery, Relapse Prevention, Resentments and Self-Esteem/Self [place].  On Tuesday client reported he missed on Monday due to feeling more depressed but denied having any thoughts of SIB's, SI's, and/or HI's at that time. Client reports doing better today with his depressive symptoms (Tue). Client was encouraged to discuss his increased depression with his psychologist and with his psychiatrist at [facility].     Intervention: Counselor/therapist used Cognitive Behavioral Therapy, ACT, Counselor Feedback, Education, Emotional management, Group Feedback, Mindfulness, Motivational Enhancement Therapy, Relapse Prevention, Twelve Step Facilitation, DBT and REBT.  > Client practiced the following skills in group session(s) this week; mindfulness based stress reduction skills, along with breathing exercises. Writer provided client with verbal interventions including: validation, support, encouragement, and psycho-education.    Boredom MH Skills Groups:    > 1. (Boredom) Client learned about the four different kinds of Boredom (Interpersonal Boredom, Existential Boredom, Leisure Time Boredom, & Life Boredom). Client learned how boredom can illustrate the refusal to accept what they are doing right now. Client learned about \"Three Sure-Fire Ways To Avoid Boredom and Relapse\"; 1. To learn how to engage his mind in a more satisfying way, like practicing mindfulness, " "turning off the screens, etc.  2. To redefine the situation so it doesn't seem like such a problem like making what he is doing meaningful as well as changing the way he looks at something and to view it more as an opportunity. 3. To stop blaming the environment, and start taking charge. Client was then given the \"Leisure Activities List From A To Z\" and identified a leisure activity that he could do starting with each of the letters in the alphabet. Client was then provided with a listing \"120 Free and Inexpensive Things To Do In [place]\" (as well as it's web address for accessing an updated list going forward) that provided client with plenty of different things that he could do to avoid boredom.     Assessment:    > Client was observed using the body scan and reported it helped with relaxation and anxiety. Client was observed using breathing exercises that were covered again during this weeks group sessions and client reported it helped with relaxation. Client appeared open to continued learning for lifelong sobriety.    > 1. (Boredom) Client was able to \"teach-back\" the skills he learned on \"Boredom\" and what he needed to do to avoid boredom. Client appeared to respond positively to what he had learned and reported that it would probably help with avoiding becoming bored and then relapsing.      > Client Appears/Sounds: Cooperative, Motivated, and Engaged.    > Client appears to be in the Action Stage within the Stages of Change Model.     Plan:   -Attend 2-3 sober support group meetings each week (this includes either 12-step or non-12-step/AA alternative meetings).  -Client will meet with his therapist once per week. ([facility])  -Client will continue to work on treatment plan objectives.  -Client will follow all recommendations of counselor and any other medical provider which includes taking all medications as prescribed.    -Client will attend all weekly " Phase 1 group sessions.   -Client to engage in sober activities each week.    Holger Braga MA, LMFT, [facility]  Licensed Psychotherapist

## 2019-09-11 NOTE — PROGRESS NOTES
[facility]   Group Progress Note    Date of Service: 9/11/19  Client Name: Shawn Camejo  YOB: 1980  MRN:  [id number]  Treating Provider: Venita Lares, PhD    Type of Session: Group  Number of Minutes: 120    Health Maintenance Summary - Mental Health Treatment Plan       Status Date      MENTAL HEALTH TX PLAN Overdue 8/28/2019      Done 9/28/2018         Current Symptoms/Status:  The client meets criteria for Other Specified Disruptive, Impulsive-Control, and Conduct Disorder (hypersexuality), which includes behavioral symptoms that cause clinically significant distress and impair social functioning. He has continued to struggle staying within boundaries but he also is unsure of what boundaries to set for himself.     The client meets criteria for Major Depressive Disorder, Recurrent Episode, Mild, which includes experiencing anhedonia, worthlessness, hypersomnia, fatigue, and a depressed mood more days than not for a period of at least two weeks.  Symptoms persist.     Client endorses the following anxiety symptoms: difficulty controlling worry; restlessness or feeling keyed up or on edge; being easily fatigued; difficulty concentrating or mind going blank; irritability; muscle tension; and sleep disturbance (difficulty falling or staying asleep, or restless unsatisfying sleep).  Symptoms persist.     The client meets criteria for Posttraumatic Stress Disorder, which includes dissociative reactions, persistent avoidance of distressing memories, negative alternations in cognitions and mood associated with the event, and marked alterations in arousal and reactivity associated with the event. The symptoms have been present for at least one month and cause clinically significant distress or impairment.  Symptoms persist.    CSB: Saw prostitute.  Broke boundaries.       Alcohol Use Disorder:  Didn't drink.  Went  to two AA groups and his [facility] group.    Progress Toward Treatment  Goals:   The client reported difficulties with mood and struggling with avoidance. Has started day treatment.  Still struggling with staying within boundaries.      Intervention: Modality and Response:  Utilized supportive and CBT techniques were used to address CSB and related mental health issues.      Processed boundary violation.  Reviewed his cycle.  Asked that he write out his own cycle.    Group gave supportive feedback.     Assignment:  Stay within defined boundaries.    Interactive Complexity:  Communication difficulties present during current the procedure included the need to manage maladaptive communication related to high anxiety, high reactivity, repeated questions, and resistance that complicated delivery of care.      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      Plan / Need for Future Services:  Return for group therapy on weekly basis.  Continue seeing Dr. Bravo 2X a month jeremy Lares, PhD

## 2019-09-12 ASSESSMENT — ANXIETY QUESTIONNAIRES: GAD7 TOTAL SCORE: 10

## 2019-09-16 ENCOUNTER — HOSPITAL ENCOUNTER (OUTPATIENT)
Dept: BEHAVIORAL HEALTH | Facility: CLINIC | Age: 39
End: 2019-09-16
Attending: SOCIAL WORKER
Payer: COMMERCIAL

## 2019-09-16 PROCEDURE — H2035 A/D TX PROGRAM, PER HOUR: HCPCS | Mod: HQ

## 2019-09-17 ENCOUNTER — HOSPITAL ENCOUNTER (OUTPATIENT)
Dept: BEHAVIORAL HEALTH | Facility: CLINIC | Age: 39
End: 2019-09-17
Attending: SOCIAL WORKER
Payer: COMMERCIAL

## 2019-09-17 ENCOUNTER — OFFICE VISIT (OUTPATIENT)
Dept: OTHER | Facility: OUTPATIENT CENTER | Age: 39
End: 2019-09-17
Payer: COMMERCIAL

## 2019-09-17 DIAGNOSIS — F10.21 ALCOHOL USE DISORDER, MODERATE, IN EARLY REMISSION, DEPENDENCE (H): ICD-10-CM

## 2019-09-17 DIAGNOSIS — F33.0 MAJOR DEPRESSIVE DISORDER, RECURRENT EPISODE, MILD (H): ICD-10-CM

## 2019-09-17 DIAGNOSIS — F41.1 GENERALIZED ANXIETY DISORDER: ICD-10-CM

## 2019-09-17 DIAGNOSIS — F91.8 OTHER CONDUCT DISORDERS: Primary | ICD-10-CM

## 2019-09-17 DIAGNOSIS — F43.10 POSTTRAUMATIC STRESS DISORDER: ICD-10-CM

## 2019-09-17 PROCEDURE — H2035 A/D TX PROGRAM, PER HOUR: HCPCS | Mod: HQ

## 2019-09-17 NOTE — PROGRESS NOTES
[facility] -  Case Progress Note    Date of Service: 9/17/19  Client Name: Shawn Camejo  YOB: 1980  MRN:  [id number]  Treating Provider: Lux Bravo, PhD Postdoctoral Fellow  Type of Session: Individual  Present in Session: Patient only  Number of Minutes:  53  Treatment Plan: Updated 9/10/19    Health Maintenance Summary - Mental Health Treatment Plan       Status Date      MENTAL HEALTH TX PLAN Overdue 8/28/2019      Done 9/28/2018         Current Symptoms/Status:  The client meets criteria for Other Specified Disruptive, Impulsive-Control, and Conduct Disorder (hypersexuality), which includes behavioral symptoms that cause clinically significant distress and impair social functioning. There appears to be evidence that client's behaviors are sexually compulsive in nature.     The client meets criteria for Major Depressive Disorder, Recurrent Episode, Mild, which includes experiencing anhedonia, worthlessness, hypersomnia, fatigue, and a depressed mood more days than not for a period of at least two weeks.    Client endorses the following anxiety symptoms: difficulty controlling worry; restlessness or feeling keyed up or on edge; being easily fatigued; difficulty concentrating or mind going blank; irritability; muscle tension; and sleep disturbance (difficulty falling or staying asleep, or restless unsatisfying sleep).    The client meets criteria for Posttraumatic Stress Disorder, which includes dissociative reactions, persistent avoidance of distressing memories, negative alternations in cognitions and mood associated with the event, and marked alterations in arousal and reactivity associated with the event. The symptoms have been present for at least one month and cause clinically significant distress or impairment.    The client meets criteria for Alcohol Use Disorder (Moderate), which includes consuming alcohol in larger amounts than intended, giving up social/recreational  activities due to use, continued use despite negative consequences (eg, CSB), and unsuccessful efforts to cut down alcohol use.      Progress Toward Treatment Goals:   The client continues to show interest and commitment towards receiving services at [facility].  The client joined CSB group 4/4/19.      Intervention: Modality and Response:  Client fully oriented to person, place, and time. Therapist employed client-centered and CBT interventions to explore client's compulsive sexual behaviors and ongoing mental health concerns. Reported not following up on homework assignment of calling other sober houses and shared ongoing anxiety about what would happen if he relapsed and got kicked out of a sober house. We discussed long-term goals and values in order to facilitate buy-in to treatment, and he reported additional guilt and shame about past relapses. Cl expressed desire to journal more over the next week. Cl was open and receptive to feedback.      Assignment:  -Continue attending CSB group.  -Call sober houses.      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     Lux Bravo, PhD  Postdoctoral Fellow

## 2019-09-18 ENCOUNTER — HOSPITAL ENCOUNTER (OUTPATIENT)
Dept: BEHAVIORAL HEALTH | Facility: CLINIC | Age: 39
End: 2019-09-18
Attending: SOCIAL WORKER
Payer: COMMERCIAL

## 2019-09-18 PROCEDURE — H2035 A/D TX PROGRAM, PER HOUR: HCPCS | Mod: HQ

## 2019-09-18 NOTE — PROGRESS NOTES
Shawn Camejo  [id number]               Adult CD Progress Note and Treatment Plan Review     Attendance     Monday    Group Date: 9/16/2019   Group Attendance Attended group session   Group Therapy Type Addiction, Life skill(s) and Psychoeducation   Group Topic Covered Boredom, Boundaries, Communication, Co-occurring Illness, Coping Skills/Lifestyle Management, Disease of Addiction/Choices in Recovery, Family Dynamics, Goal Setting and Meditation/Breathing Exercises   Client Participation/Contribution Cooperative with task Discussed personal experience with topic Expressed understanding of topic Listened actively Offered helpful suggestions to peers   Client Participation Detail Highly involved   Attendance 3.0 Hours   Individual Attendance None   Family Attendance None   Other Comments/Information None      Tuesday    Group Date: 9/17/2019   Group Attendance Attended group session   Group Therapy Type Addiction, Life skill(s) and Psychoeducation   Group Topic Covered Boredom, Boundaries, Communication, Co-occurring Illness, Coping Skills/Lifestyle Management, Disease of Addiction/Choices in Recovery, Family Dynamics, Goal Setting and Meditation/Breathing Exercises   Client Participation/Contribution Cooperative with task Discussed personal experience with topic Expressed understanding of topic Listened actively Offered helpful suggestions to peers   Client Participation Detail Highly involved   Attendance 3.0 Hours   Individual Attendance None   Family Attendance None   Other Comments/Information None      Wednesday    Group Date: 9/18/2019   Group Attendance Attended group session   Group Therapy Type Addiction, Life skill(s) and Psychoeducation   Group Topic Covered Boredom, Boundaries, Communication, Co-occurring Illness, Coping Skills/Lifestyle Management, Disease of Addiction/Choices in Recovery, Family Dynamics, Goal Setting and Meditation/Breathing Exercises   Client Participation/Contribution Cooperative  with task Discussed personal experience with topic Expressed understanding of topic Listened actively Offered helpful suggestions to peers   Client Participation Detail Highly involved   Attendance 3.0 Hours   Individual Attendance None   Family Attendance None   Other Comments/Information None     Total # of Phase 1 Group Sessions: 9     Total # of Phase 2 Group Sessions:   Total # of Phase 3 Group Sessions:   Total # of 1:1 Sessions: 1    Support group attended this week: yes    Reporting sobriety: Yes - Last reported use date for alcohol and marijuana; 07/27/19    Treatment Plan Review     Treatment Plan Review completed on:  9/18/2019     Projected discharge date: 12/18/19    Client preferred learning style: Visual  Hands on  Verbal  Demonstration    Staff member(s) contributing: HARPREET Carmichael, EKTA & Lucille Castellanos MFA, MA, [facility]    Received supervision: No    Client involvement with treatment planning: Contributed to goals and plan.    Client received copy of plan/revised plan: Yes    Client agrees with plan/revised plan: Yes    Changes to Treatment Plan: No     Client's Dimension 1 Goal(s) Develop effective strategies to maintain sobriety.   Goal not addressed this week.   Client's Dimension 2 Goal(s) Obtain a physical evaluation. Goal not addressed this week.   Client's Dimension 3 Goal(s) Client will learn and utilize coping skills learned in the program to manage symptoms of depression and anxiety more effectively, reducing his PHQ9 score from 10 <6. See below.   Client's Dimension 4 Goal(s) Increase awareness with how substance use is conflicted with personal values and address any ambivalence to change.  See below.   Client's Dimension 5 Goal(s) Increase awareness of the disease concept of addiction and insight into personal relapse process, triggers and strategies to address. See below.   Client's Dimension 6 Goal(s) Expand sober support network and be involved in sober activities. See  "below.     New Goals added since last review: None.    Goal(s) worked on since last review:   Dimension 3, Dimension 4, & Dimension 5: Client learned and practiced the following skills in group session(s) this week; Boredom, Boundaries, Communication, Co-occurring Illness, Coping Skills/Lifestyle Management, Disease of Addiction/Choices in Recovery, Family Dynamics, Goal Setting and Meditation/Breathing Exercises. (See DIAP below for additional information)  Dimension 6: Client reports having a high level of dissatisfaction with his current sober house but as of this week still had not called any of the sober houses from the lists he was provided with. On Wednesday client reported he \"will start calling them and not try to just isolate instead\".      Strategies effective: Yes    Treatment Coordination Activities: Yes - With Co-counselor Lucille Castellanos MFA, MA, [facility].    Medical, Mental Health and other appointments the client attended: Yes - Client sees his psychologist and psychiatirst at [facility] as part of the [facility].     Medication issues: None.  Current Outpatient Medications   Medication     naltrexone (DEPADE/REVIA) 50 MG tablet     sertraline (ZOLOFT) 100 MG tablet     No current facility-administered medications for this encounter.      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 "     Substance Use Disorders:    303.90 (F10.20) Alcohol Use Disorder Moderate    ASAM Risk Rating: (Note the rationale for risk rating changes)    Dimension 1 0 No Change    Dimension 2 0 No Change    Dimension 3 2 No Change    Dimension 4 1 Risk Rating reduced as client has had good attenendance overall to groups and is enaged in his treatment programs. Client appears motivated and talks about being motived towards life long recovery.    Dimension 5 3 No Change      Dimension 6 2 No Change    Data: (Need to include short narrative for the week on what was worked on)  offered feedback good insight client did actively participate  Monday, Tuesday & Wednesday: Client learned and practiced the following skills in group session(s) this week; Boredom, Boundaries, Communication, Co-occurring Illness, Coping Skills/Lifestyle Management, Disease of Addiction/Choices in Recovery, Family Dynamics, Goal Setting and Meditation/Breathing Exercises. Client also reported that his depression was much better this week and continues to deny having any SIB's/SI's and HI's at this time.     Intervention: Counselor/therapist used Cognitive Behavioral Therapy, ACT, Counselor Feedback, Education, Emotional management, Group Feedback, Mindfulness, Motivational Enhancement Therapy, Relapse Prevention, Twelve Step Facilitation, DBT and REBT.  > Client practiced the following skills in group session(s) this week; mindfulness based stress reduction skills, along with breathing exercises. Writer provided client with verbal interventions including: validation, support, encouragement, and psycho-education.    Boredom (continued discussion from prior week) and Boundaries MH Skills Groups:    > 1. (Boredom-Continued) Client learned about the four different kinds of Boredom (Interpersonal Boredom, Existential Boredom, Leisure Time Boredom, & Life Boredom). Client learned how boredom can illustrate the refusal to accept what they are doing right now.  "Client learned about \"Three Sure-Fire Ways To Avoid Boredom and Relapse\"; 1. To learn how to engage his mind in a more satisfying way, like practicing mindfulness, turning off the screens, etc.  2. To redefine the situation so it doesn't seem like such a problem like making what he is doing meaningful as well as changing the way he looks at something and to view it more as an opportunity. 3. To stop blaming the environment, and start taking charge. Client was then given the \"Leisure Activities List From A To Z\" and identified a leisure activity that he could do starting with each of the letters in the alphabet. Client was then provided with a listing \"120 Free and Inexpensive Things To Do In [place]\" (as well as it's web address for accessing an updated list going forward) that provided client with plenty of different things that he could do to avoid boredom.     > 2. (Boundaries) Client learned about what \"Personal Boundaries\" are. Client first took a \"Boundaries Assessment\" and turned it in after identifying some of the biggest things that stood out to him. Client understands that towards the end of treatment that he will take this assessment again and then will be able to compare the differences or changes in clients personal boundaries. Client then learned the differences between \"Rigid\", \"Porous\", and \"Healthy\" [place] Triats. Client learned about how culture plays a roll on boundaries and how most people have a combination of the three aforementioned boundary traits. Client learned about the different types of boundaries such as; \"Physical, Intellectual, Emotional, Sexual, Material, and Time Boundaries\". Client then learned about the Signs and Symptoms of Ignored Boundaries such as; \"Over Enmeshment, Disassociation, Excessive Detachment, Victim-chong or Martyrdom, Chip on the Shoulder, Invisibility, Aloofness or Shyness, Cold and Distant, Smothering, and Lack of Privacy\" ignored boundary symptoms. " "Client then learned a \"5-Step Process on How to Establish Healthy Boundaries. Client learned how addiction and boundaries interact which likely results in unhealthy or codependent relationships. Client then learned about the \"[place] Drama [place]\" and how triangulation effects ones recovery.    Assessment:    > Client was observed using the body scan and reported it helped with relaxation and anxiety. Client was observed using breathing exercises that were covered again during this weeks group sessions and client reported it helped with relaxation. Client appeared open to continued learning for lifelong sobriety.    > 1. (Boredom-Continued) Client was able to \"teach-back\" the skills he learned on \"Boredom\" and what he needed to do to avoid boredom. Client appeared to respond positively to what he had learned and reported that it would probably help with avoiding becoming bored and then relapsing.      > 2. (Boundaries) Client was able to \"teach-back\" the skills he learned on \"Boundaries\" by the following; listing people he identified as having difficulty setting boundaries with, identifying people, situations, and actions, how with knowing the aforementioned information how they would now be able to set boundaries with these people, identified changes he would like to make in his boundaries and lastly what he would do if others were resistant to accepting his boundaries. Client then able to identify those who he had a co-depedent relationship(s) with and client was able to clearly identify how he was would address the relationship(s). Client spent time dissecting these relationship(s) which helped him identify why he was continuing to engage in the relationship(s) as well as step by step breakdown on what he needed to do to avoid having co-dependent relationship(s). Client appeared to gain insight into the effects alcohol and drugs have in producing co-dependent relationships as well as the increased chances of " "entering a codependent relationship early in recovery due partially to all the different chemical changes taking place in the brain, increase in emotions, and etc. Client responded optimistically to what he learned and appeared more encouraged about what options he had in addressing these relationships. Client was able to do a role play exercise with fellow group members on identifying if someone is being a \"[place] Violator\" and then practicing setting proper boundaries with the \"[place] Violator\".     > Client Appears/Sounds: Cooperative, Motivated, and Engaged.    > Client appears to be in the Action Stage within the Stages of Change Model.     Plan:   -Attend 2-3 sober support group meetings each week (this includes either 12-step or non-12-step/AA alternative meetings).  -Client will meet with his therapist once per week. ([facility])  -Client will continue to work on treatment plan objectives.  -Client will follow all recommendations of counselor and any other medical provider which includes taking all medications as prescribed.    -Client will attend all weekly Phase 1 group sessions.   -Client to engage in sober activities each week.    Holger Braga MA, LMFT, [facility]  Licensed Psychotherapist   "

## 2019-09-19 ENCOUNTER — OFFICE VISIT (OUTPATIENT)
Dept: OTHER | Facility: OUTPATIENT CENTER | Age: 39
End: 2019-09-19
Payer: COMMERCIAL

## 2019-09-19 DIAGNOSIS — F10.21 ALCOHOL USE DISORDER, MODERATE, IN EARLY REMISSION, DEPENDENCE (H): ICD-10-CM

## 2019-09-19 DIAGNOSIS — F33.0 MAJOR DEPRESSIVE DISORDER, RECURRENT EPISODE, MILD (H): ICD-10-CM

## 2019-09-19 DIAGNOSIS — F91.8 OTHER CONDUCT DISORDERS: Primary | ICD-10-CM

## 2019-09-19 DIAGNOSIS — F41.1 GENERALIZED ANXIETY DISORDER: ICD-10-CM

## 2019-09-19 NOTE — PROGRESS NOTES
[place] [facility]   Group Progress Note    Date of Service: 9/19/19  Client Name: Shawn Camejo  YOB: 1980  MRN:  [id number]  Treating Provider: Venita Lares, PhD    Type of Session: Group  Number of Minutes: 120    Health Maintenance Summary - Mental Health Treatment Plan       Status Date      MENTAL HEALTH TX PLAN Overdue 8/28/2019      Done 9/28/2018         Current Symptoms/Status:  The client meets criteria for Other Specified Disruptive, Impulsive-Control, and Conduct Disorder (hypersexuality), which includes behavioral symptoms that cause clinically significant distress and impair social functioning. He has continued to struggle staying within boundaries but he also is unsure of what boundaries to set for himself.     The client meets criteria for Major Depressive Disorder, Recurrent Episode, Mild, which includes experiencing anhedonia, worthlessness, hypersomnia, fatigue, and a depressed mood more days than not for a period of at least two weeks.  Symptoms persist.     Client endorses the following anxiety symptoms: difficulty controlling worry; restlessness or feeling keyed up or on edge; being easily fatigued; difficulty concentrating or mind going blank; irritability; muscle tension; and sleep disturbance (difficulty falling or staying asleep, or restless unsatisfying sleep).  Symptoms persist.     The client meets criteria for Posttraumatic Stress Disorder, which includes dissociative reactions, persistent avoidance of distressing memories, negative alternations in cognitions and mood associated with the event, and marked alterations in arousal and reactivity associated with the event. The symptoms have been present for at least one month and cause clinically significant distress or impairment.  Symptoms persist.    CSB: Stayed within boundaries!    Alcohol Use Disorder:  Didn't drink.  Went  to two AA groups and his [facility] group.    Progress Toward Treatment Goals:   Was able to  stay within boundaries.  Has not given in to urges.  Talked with othrs about his last slip.      Intervention: Modality and Response:  Utilized supportive and CBT techniques were used to address CSB and related mental health issues.      Processed his last week.  Struggles.  Has been doing some avoidance, and feeling depressed.  Has money to act out again.  So recognize that he is a risk situation.    Still need to write out his own cycle.    Group gave supportive feedback.     Assignment:  Stay within defined boundaries.    Interactive Complexity:  Communication difficulties present during current the procedure included the need to manage maladaptive communication related to high anxiety, high reactivity, repeated questions, and resistance that complicated delivery of care.      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      Plan / Need for Future Services:  Return for group therapy on weekly basis.  Continue seeing Dr. Bravo 2X a month jeremy Lares, PhD

## 2019-09-23 ENCOUNTER — HOSPITAL ENCOUNTER (OUTPATIENT)
Dept: BEHAVIORAL HEALTH | Facility: CLINIC | Age: 39
End: 2019-09-23
Attending: SOCIAL WORKER
Payer: COMMERCIAL

## 2019-09-23 PROCEDURE — H2035 A/D TX PROGRAM, PER HOUR: HCPCS | Mod: HQ

## 2019-09-23 NOTE — PROGRESS NOTES
Shawn Camejo  [id number]                     Adult CD Progress Note and Treatment Plan Review       Integrated Progress Summary      Group Session Attendance & Engagement:     Monday    Group Date: 9/23/2019   Group Attendance Attended group session   Group Therapy Type Addiction   Group Topic Covered Boundaries, Relapse prevention, PAWS: symptoms/management   Client Participation/Contribution Cooperative with task Discussed personal experience with topic Expressed understanding of topic Listened actively Offered helpful suggestions to peers   Client Participation Detail Highly involved   Attendance 3.0 Hours   Individual Attendance None   Family Attendance None   Other Comments/Information Client also engaged in treatment and individual psychotherapy for other diagnosis.        Wednesday   Group Date: 9/25/2019   Group Attendance Attended group session   Group Therapy Type Addiction, Life skill(s) and Psychoeducation   Group Topic Covered Boundaries, Communication, Coping Skills, Disease of Addiction/Choices in Recovery: Passivity, Action Steps, Powerlessness, Acceptance   Client Participation/Contribution Cooperative with task Discussed personal experience with topic Expressed understanding of topic Listened actively Offered helpful suggestions to peers   Client Participation Detail Highly involved   Attendance 3.0 Hours   Individual Attendance 1.0 Hours   Family Attendance None   Other Comments/Information Client also engaged in treatment and individual psychotherapy for other diagnosis.     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     Medication issues: None.    Current Outpatient Medications   Medication     naltrexone (DEPADE/REVIA) 50 MG tablet     sertraline (ZOLOFT) 100 MG tablet     No current facility-administered medications for this encounter.      Facility-Administered Medications Ordered in Other Encounters   Medication     Self Administer Medications: Behavioral Services       ASAM Dimension & Risk Factors: Summary Update  (Note the rationale for risk rating changes)  Dimension 1 0 Client rates current cravings/urges to use at 4/10. He endorses symptoms of PAWS, including: mood swings (4), excessive sleep (2), fatigue (6), irritability (4), and difficulty concentrating (6). Client provided information on PAWS and phases/warning signs of relapse. Client reports taking Naltrexone as prescribed (50 mg).    Dimension 2 0 Client rates overall health at 7/10.  Client reports would like to add exercise for improved wellness.    Dimension 3 2 Client denies SI, Intent, or thoughts of self-harm. Client rates feelings of sadness at 5/10. Client taking Sertraline (100 mg) as prescribed. Client reports that he continues to meet with therapist, currently focused on family of origin issues.    Dimension 4 1 Client rates current motivation for sobriety at 7/10, noting his primary motivation: \"I like myself.\" Client reports struggling to complete treatment plan assignments, but met with counselor to adapt for scope and " learning style preferences.     Dimension 5 3 Client reports attending one AA meeting during this review period; he also reports working with a sponsor. Client notes work stress and peer pressure are biggest triggers in past week.     Dimension 6 2 Client rates stress with current living environment at 5/10, noting he would like to find a better place to live but also grateful to have a home. Client reports need to renew license and get auto insurance.       Review and evaluation of the individual abuse prevention plan: The program's individual abuse prevention plan (IAPP) is sufficient for this client.       Treatment Plan Review     Treatment Plan Review completed on:  9/23/2019     Projected discharge date: 12/18/19    Client preferred learning style: Visual  Hands on  Verbal  Demonstration    Staff member(s) contributing: Lucille Castellanos MFA, MA, [facility]    Received supervision: No     Treatment Coordination Activities: Yes, with co-counselor Holger Braga, HARPREET, [facility]     Client involvement with treatment planning: Contributed to goals and plan.    Client received copy of plan/revised plan: Yes    Client agrees with plan/revised plan: Yes    Strategies effective: Yes    Changes to Treatment Plan: No     New Goals added since last review: None.    Goal(s) worked on since last review:   Dimension 3, Dimension 4, & Dimension 5: Client learned and practiced the following skills in group session(s) this week: Boundaries, Communication, Coping Skills/Lifestyle Management, Disease of Addiction/Choices in Recovery. (See DIAP below for additional information)    Data: (Need to include short narrative for the week on what was worked on)  offered feedback good insight client did actively participate  Client shared that he has tendency to feel overwhelmed, and then finds it difficult to complete treatment plan assignments and action steps for establishing healthy recovery lifestyle. Client stated goals for recovery, but  shared that assignment to write about his life after 5 years of sobriety is overwhelming, that at current 3 months of sobriety he is focused on managing current daily needs. Client shared that attending 12-Step meetings is very helpful, stating motivation to deepen understanding and to establish healthy sober support network. Client notes that when feelings of overwhelm increase, more difficult to attend.    Intervention:   Counselor used therapeutic modalities, including: Psychoeducation, Cognitive Behavioral Therapy, Twelve-Step Facilitation, and Motivational Enhancement Therapy. Psychoeducation focus for this week's first group session focused on topic of Ana Laura's phases and warning signs of relapse and PAWS; the second group session focused on action steps for recovery, including Joce's AAA (Admit, Ask, Act), and Big Book reading on Acceptance. Group processed personal issues on self-sabotage, communication, and boundaries.    Assessment:      > Client Appears/Sounds: Cooperative, Motivated, and Engaged.    > Client appears to be in the Action Stage within the Stages of Change Model.     Plan:   Client will meet with counselor to review completed treatment plan assignments and review current treatment plan goals.      Lucille Castellanos MFA, MA, [facility]

## 2019-09-24 ENCOUNTER — OFFICE VISIT (OUTPATIENT)
Dept: OTHER | Facility: OUTPATIENT CENTER | Age: 39
End: 2019-09-24
Payer: COMMERCIAL

## 2019-09-24 DIAGNOSIS — F91.8 OTHER CONDUCT DISORDERS: Primary | ICD-10-CM

## 2019-09-24 DIAGNOSIS — F41.1 GENERALIZED ANXIETY DISORDER: ICD-10-CM

## 2019-09-24 DIAGNOSIS — F43.10 POSTTRAUMATIC STRESS DISORDER: ICD-10-CM

## 2019-09-24 DIAGNOSIS — F33.0 MAJOR DEPRESSIVE DISORDER, RECURRENT EPISODE, MILD (H): ICD-10-CM

## 2019-09-24 DIAGNOSIS — F10.21 ALCOHOL USE DISORDER, MODERATE, IN EARLY REMISSION, DEPENDENCE (H): ICD-10-CM

## 2019-09-24 NOTE — PROGRESS NOTES
[facility] -  Case Progress Note    Date of Service: 9/24/19  Client Name: Shawn Camejo  YOB: 1980  MRN:  [id number]  Treating Provider: Lux Bravo, PhD Postdoctoral Fellow  Type of Session: Individual  Present in Session: Patient only  Number of Minutes:  53  Treatment Plan: Updated 9/10/19    Health Maintenance Summary - Mental Health Treatment Plan       Status Date      MENTAL HEALTH TX PLAN Overdue 8/28/2019      Done 9/28/2018         Current Symptoms/Status:  The client meets criteria for Other Specified Disruptive, Impulsive-Control, and Conduct Disorder (hypersexuality), which includes behavioral symptoms that cause clinically significant distress and impair social functioning. There appears to be evidence that client's behaviors are sexually compulsive in nature.     The client meets criteria for Major Depressive Disorder, Recurrent Episode, Mild, which includes experiencing anhedonia, worthlessness, hypersomnia, fatigue, and a depressed mood more days than not for a period of at least two weeks.    Client endorses the following anxiety symptoms: difficulty controlling worry; restlessness or feeling keyed up or on edge; being easily fatigued; difficulty concentrating or mind going blank; irritability; muscle tension; and sleep disturbance (difficulty falling or staying asleep, or restless unsatisfying sleep).    The client meets criteria for Posttraumatic Stress Disorder, which includes dissociative reactions, persistent avoidance of distressing memories, negative alternations in cognitions and mood associated with the event, and marked alterations in arousal and reactivity associated with the event. The symptoms have been present for at least one month and cause clinically significant distress or impairment.    The client meets criteria for Alcohol Use Disorder (Moderate), which includes consuming alcohol in larger amounts than intended, giving up social/recreational  activities due to use, continued use despite negative consequences (eg, CSB), and unsuccessful efforts to cut down alcohol use.      Progress Toward Treatment Goals:   The client continues to show interest and commitment towards receiving services at [facility].  The client joined CSB group 4/4/19.  Cl's last session prior to transferring to Dr. Pratt.      Intervention: Modality and Response:  Client fully oriented to person, place, and time. Therapist employed client-centered and CBT interventions to explore client's compulsive sexual behaviors and ongoing mental health concerns. This was cl's final session with this writer; he will transfer to Dr. Pratt, who co-facilitates cl's CSB group. Cl reported he is approaching three-month anniversary of alcohol sobriety. Cl reflected on shame and identified antecedents of shame cycle. He expressed optimism for his CSB recovery and gratitude for working with this writer over the last year. Cl was open and receptive to feedback.       Assignment:  -Continue attending CSB group.  -Begin meeting with Dr. Pratt next week.      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     Lux Bravo, PhD  Postdoctoral Fellow

## 2019-09-25 ENCOUNTER — HOSPITAL ENCOUNTER (OUTPATIENT)
Dept: BEHAVIORAL HEALTH | Facility: CLINIC | Age: 39
End: 2019-09-25
Attending: SOCIAL WORKER
Payer: COMMERCIAL

## 2019-09-25 PROCEDURE — H2035 A/D TX PROGRAM, PER HOUR: HCPCS

## 2019-09-25 PROCEDURE — H2035 A/D TX PROGRAM, PER HOUR: HCPCS | Mod: HQ

## 2019-09-26 ENCOUNTER — OFFICE VISIT (OUTPATIENT)
Dept: OTHER | Facility: OUTPATIENT CENTER | Age: 39
End: 2019-09-26
Payer: COMMERCIAL

## 2019-09-26 DIAGNOSIS — F10.21 ALCOHOL USE DISORDER, MODERATE, IN EARLY REMISSION, DEPENDENCE (H): ICD-10-CM

## 2019-09-26 DIAGNOSIS — F91.8 OTHER CONDUCT DISORDERS: Primary | ICD-10-CM

## 2019-09-26 DIAGNOSIS — F43.10 POSTTRAUMATIC STRESS DISORDER: ICD-10-CM

## 2019-09-26 DIAGNOSIS — F41.1 GENERALIZED ANXIETY DISORDER: ICD-10-CM

## 2019-09-26 NOTE — PROGRESS NOTES
[facility]   Group Progress Note    Date of Service: 9/26/19  Client Name: Shawn Camejo  YOB: 1980  MRN:  [id number]  Treating Provider: Venita Lares, PhD  And Shireen Pratt, PhD  Type of Session: Group  Number of Minutes: 120    Health Maintenance Summary - Mental Health Treatment Plan       Status Date      MENTAL HEALTH TX PLAN Overdue 8/28/2019      Done 9/28/2018         Current Symptoms/Status:  The client meets criteria for Other Specified Disruptive, Impulsive-Control, and Conduct Disorder (hypersexuality), which includes behavioral symptoms that cause clinically significant distress and impair social functioning. He has continued to struggle staying within boundaries but he also is unsure of what boundaries to set for himself.     The client meets criteria for Major Depressive Disorder, Recurrent Episode, Mild, which includes experiencing anhedonia, worthlessness, hypersomnia, fatigue, and a depressed mood more days than not for a period of at least two weeks.  Symptoms persist.     Client endorses the following anxiety symptoms: difficulty controlling worry; restlessness or feeling keyed up or on edge; being easily fatigued; difficulty concentrating or mind going blank; irritability; muscle tension; and sleep disturbance (difficulty falling or staying asleep, or restless unsatisfying sleep).  Symptoms persist.     The client meets criteria for Posttraumatic Stress Disorder, which includes dissociative reactions, persistent avoidance of distressing memories, negative alternations in cognitions and mood associated with the event, and marked alterations in arousal and reactivity associated with the event. The symptoms have been present for at least one month and cause clinically significant distress or impairment.  Symptoms persist.    CSB: Stayed within boundaries!    Alcohol Use Disorder:  Didn't drink.  Went  to two AA groups and his [facility] group.    Depression:  Still  feeling depressed but feeling a bit more hopeful.    Progress Toward Treatment Goals:   Was able to stay within boundaries.  Has not given in to urges.      Intervention: Modality and Response:  Utilized supportive and CBT techniques were used to address CSB and related mental health issues.      Wrote about his cycle.  Related many elements of it but not quite in order.  Needs to work on more of a behavioral chain.  Received postive feedback from group.  Was able to own his own progress - has gone through two pay periods without violating boundaries.    Still need to write out his own cycle.    Group gave supportive feedback.     Assignment:  Stay within defined boundaries.    Interactive Complexity:  Communication difficulties present during current the procedure included the need to manage maladaptive communication related to high anxiety, high reactivity, rambling, that complicated delivery of care.      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      Plan / Need for Future Services:  Return for group therapy on weekly basis.  Start seeing Dr. Pratt for individual therapy as an adjunct to group.  Venita Lares, PhD

## 2019-09-30 ENCOUNTER — HOSPITAL ENCOUNTER (OUTPATIENT)
Dept: BEHAVIORAL HEALTH | Facility: CLINIC | Age: 39
End: 2019-09-30
Attending: SOCIAL WORKER
Payer: COMMERCIAL

## 2019-09-30 PROCEDURE — H2035 A/D TX PROGRAM, PER HOUR: HCPCS | Mod: HQ

## 2019-10-01 ENCOUNTER — HOSPITAL ENCOUNTER (OUTPATIENT)
Dept: BEHAVIORAL HEALTH | Facility: CLINIC | Age: 39
End: 2019-10-01
Attending: SOCIAL WORKER
Payer: COMMERCIAL

## 2019-10-01 PROCEDURE — H2035 A/D TX PROGRAM, PER HOUR: HCPCS | Mod: HQ

## 2019-10-01 NOTE — PROGRESS NOTES
"          Adult MH Progress Note       Attendance                                    Tuesday                                                                     Group Date: 10/1/2019   Group Attendance Attended group session   Group Therapy Type Addiction, Life skill(s) and Psychoeducation   Group Topic Covered  Disease of Addiction/Choices in Recovery, Goal Setting, Meditation/Breathing Exercises and Spirituality    Client Participation/Contribution Cooperative with task Discussed personal experience with topic Expressed understanding of topic Listened actively Offered helpful suggestions to peers   Client Participation Detail Highly involved   Attendance 3.0 Hours   Individual Attendance None   Family Attendance None   Other Comments/Information None     Treatment plan review completed on 10/1/19     Data:  Client was an active participant in group by engaging in the Spirituality Meditation exercise, group check-in, and the group activity on Nurturing the Images We Can Live by, which included individual reflection, a group discussion, and identification of a spiritual tool to use over the next week.  Client reports feeling the following two emotions today: 1. \"anxious\" and 2. \"overwhelmed\".  Client reported stuggling with feeling \"overwhelmed\" in the past 24-hours. Client reports using the following coping skill(s) to address the struggle: \"connecting with people and calling my sponsor\". Client reported being grateful for 1. \"workmates\" and 2. \"being in recovery\". Client denied having any SI/SIB's/SA's/HI's at this time.     Intervention: Counselor/therapist used Cognitive Behavioral Therapy, ACT, Counselor Feedback, Education, Emotional management, Group Feedback, Mindfulness, Motivational Enhancement Therapy, Relapse Prevention, Twelve Step Facilitation, DBT and REBT.  > Client practiced the following skills in group session(s) this week: Spirituality Meditation. Writer provided client with verbal interventions " including: validation, support, encouragement, and psycho-education.     Mental Health Spirituality Group:   > Client also learned and practiced the following skills in group sessions this week: identifying sources of spirituality through discussion and activity on Nurturing the Images We Can Live By.       Assessment:    > Client was observed engaging in the Spirituality Meditation and reported it helped with relaxation and anxiety.  He stated that he particularly enjoyed the body awareness part of the meditation, which he plans on using in the future.  Client appeared very open to continued learning for lifelong sobriety.     > Client was able to teach-back the skills he learned about spirituality by verbalizing that nature and social connection can be sources of strength for him.      > Client appeared to gain insight into the importance of exploring his spirituality and engaging in his spirituality by connecting with others.    Plan:   - Continue attending all Phase I group sessions  - Continue to complete treatment plan goals  - Connect with sponsor and sober peers          Claudia Kim MS, LADC, LPCC

## 2019-10-02 ENCOUNTER — HOSPITAL ENCOUNTER (OUTPATIENT)
Dept: BEHAVIORAL HEALTH | Facility: CLINIC | Age: 39
End: 2019-10-02
Attending: SOCIAL WORKER
Payer: COMMERCIAL

## 2019-10-02 PROCEDURE — H2035 A/D TX PROGRAM, PER HOUR: HCPCS | Mod: HQ

## 2019-10-03 ENCOUNTER — OFFICE VISIT (OUTPATIENT)
Dept: OTHER | Facility: OUTPATIENT CENTER | Age: 39
End: 2019-10-03
Payer: COMMERCIAL

## 2019-10-03 ENCOUNTER — TEAM CONFERENCE (OUTPATIENT)
Dept: OTHER | Facility: OUTPATIENT CENTER | Age: 39
End: 2019-10-03

## 2019-10-03 DIAGNOSIS — F91.9 DISTURBANCE OF CONDUCT: ICD-10-CM

## 2019-10-03 DIAGNOSIS — F33.0 MAJOR DEPRESSIVE DISORDER, RECURRENT EPISODE, MILD (H): ICD-10-CM

## 2019-10-03 DIAGNOSIS — F91.8 CONDUCT DISORDER, UNDIFFERENTIATED TYPE: ICD-10-CM

## 2019-10-03 DIAGNOSIS — F41.1 GENERALIZED ANXIETY DISORDER: ICD-10-CM

## 2019-10-03 DIAGNOSIS — F10.21 ALCOHOL USE DISORDER, MODERATE, IN EARLY REMISSION, DEPENDENCE (H): ICD-10-CM

## 2019-10-03 DIAGNOSIS — F91.8 CONDUCT DISORDER, UNDIFFERENTIATED TYPE: Primary | ICD-10-CM

## 2019-10-03 DIAGNOSIS — F43.10 POSTTRAUMATIC STRESS DISORDER: ICD-10-CM

## 2019-10-03 DIAGNOSIS — F91.8 OTHER CONDUCT DISORDERS: Primary | ICD-10-CM

## 2019-10-03 NOTE — PROGRESS NOTES
[facility] -  Case Progress Note    Date of Service: 10/03/19  Client Name: Shawn Camejo  YOB: 1980  MRN:  [id number]  Treating Provider: Shireen Pratt, PhD, Postdoctoral Fellow  Type of Session: Individual  Present in Session: Patient only  Number of Minutes: 53    Health Maintenance Summary - Mental Health Treatment Plan       Status Date      MENTAL HEALTH TX PLAN Next Due 8/11/2020      Done 9/11/2019 [facility] MENTAL HEALTH TX PLAN SCAN     Done 9/28/2018         Current Symptoms/Status:  The client meets criteria for Other Specified Disruptive, Impulsive-Control, and Conduct Disorder (hypersexuality), which includes behavioral symptoms that cause clinically significant distress and impair social functioning. There appears to be evidence that client's behaviors are sexually compulsive in nature.     The client meets criteria for Major Depressive Disorder, Recurrent Episode, Mild, which includes experiencing anhedonia, worthlessness, hypersomnia, fatigue, and a depressed mood more days than not for a period of at least two weeks.    Client endorses the following anxiety symptoms: difficulty controlling worry; restlessness or feeling keyed up or on edge; being easily fatigued; difficulty concentrating or mind going blank; irritability; muscle tension; and sleep disturbance (difficulty falling or staying asleep, or restless unsatisfying sleep).    The client meets criteria for Posttraumatic Stress Disorder, which includes dissociative reactions, persistent avoidance of distressing memories, negative alternations in cognitions and mood associated with the event, and marked alterations in arousal and reactivity associated with the event. The symptoms have been present for at least one month and cause clinically significant distress or impairment.    The client meets criteria for Alcohol Use Disorder (Moderate), which includes consuming alcohol in larger amounts than intended, giving up  social/recreational activities due to use, continued use despite negative consequences (eg, CSB), and unsuccessful efforts to cut down alcohol use.      Progress Toward Treatment Goals:   Client continues to show interest and commitment towards receiving services at [facility]  Client joined CSB group 4/4/19.  First session with a new psychologist at [facility]  He is in an AA treatment group at [facility].        Intervention: Modality and Description:  Individual, interpersonal, CBT. The focus of today's session was on helping him increase insight and understanding about what he has worked with other therapists and it is missing. It was assessed the severity and frequency of depression symptoms and an overview about his progress.    Response to Intervention:  He mentioned being motivated and engaged to work with a new psychologist. Reviewing all progress helps him to feel in the right track. Although symptoms of depression are still present. He reported that graphically assessing the symptoms of depression may help him better monitor them.  Engaging in self-care needs to be a daily activity for him and he has not doing a good job at it.    Assignment:  - Continue attending CSB and AA group.  - Use the depression symptom charts to review the notes made on his journal.    Interactive Complexity:  N/A      Diagnosis:  312.89 (F91.8) Other Specified Disruptive, Impulsive-Control, and Conduct Disorder (hypersexuality)   296.31 (F33.0) Major Depressive Disorder, mild, recurrent   300.02 (F41.1) Generalized Anxiety Disorder   309.81 (F43.10) Posttraumatic Stress Disorder   303.90 (F10.20) Alcohol Use Disorder (Moderate)       Plan / Need for Future Services:  Return for therapy in one week.     Shireen Pratt, PhD, Postdoctoral Fellow

## 2019-10-04 NOTE — PROGRESS NOTES
[facility]   Group Progress Note    Date of Service: 10/03/19  Client Name: Shawn Camejo  YOB: 1980  MRN:  [id number]  Treating Provider: Venita Lares, PhD  And Shireen Pratt, PhD  Type of Session: Group  Number of Minutes: 120    Health Maintenance Summary - Mental Health Treatment Plan       Status Date      MENTAL HEALTH TX PLAN Overdue 8/28/2019      Done 9/28/2018         Current Symptoms/Status:  The client meets criteria for Other Specified Disruptive, Impulsive-Control, and Conduct Disorder (hypersexuality), which includes behavioral symptoms that cause clinically significant distress and impair social functioning. He has continued to struggle staying within boundaries but he also is unsure of what boundaries to set for himself.     The client meets criteria for Major Depressive Disorder, Recurrent Episode, Mild, which includes experiencing anhedonia, worthlessness, hypersomnia, fatigue, and a depressed mood more days than not for a period of at least two weeks.  Symptoms persist.     Client endorses the following anxiety symptoms: difficulty controlling worry; restlessness or feeling keyed up or on edge; being easily fatigued; difficulty concentrating or mind going blank; irritability; muscle tension; and sleep disturbance (difficulty falling or staying asleep, or restless unsatisfying sleep).  Symptoms persist.     The client meets criteria for Posttraumatic Stress Disorder, which includes dissociative reactions, persistent avoidance of distressing memories, negative alternations in cognitions and mood associated with the event, and marked alterations in arousal and reactivity associated with the event. The symptoms have been present for at least one month and cause clinically significant distress or impairment.  Symptoms persist.    CSB: Stayed within boundaries!    Alcohol Use Disorder:  Didn't drink. Went to two AA groups and his [facility] group.    Depression:  Still feeling  depressed but feeling a bit more hopeful.    Progress Toward Treatment Goals:   Was able to stay within boundaries.  Has not given in to urges.      Intervention: Modality and Response:  Utilized supportive and CBT techniques were used to address CSB and related mental health issues.    The client is three months sober, he is not gambling and he has gone through two pay periods without violating boundaries. He is very happy because he realized that staying on his boundaries makes him feel good and safe.    He had a problem with his car on this week, but that didn't discourage him. He handled it very well. He realized that by staying on his boundaries, he saved money and he could used that money to solve car problems and others situations. He feels more fulfilled that he can afford that. Money still is a problem but he can deal positive.    Also, he described that he feels more present, more hopeful. He said he understands that he needs to take better care of himself. Received postive feedback from group.     He gave good positive feedback to other people in the group, as well as supportive speech with a smiling face.   He is still working to write out his own cycle in details.     Assignment:  Stay within defined boundaries, keeping enduring himself with daily challenges and paying attention to his needs.     Interactive Complexity:  N/A      Diagnosis:  312.89 Other Specified Disruptive, Impulsive-Control, and Conduct Disorder (hypersexuality) (F91.8)  296.31 Major Depressive Disorder, mild, recurrent (F33.0)  300.02 Generalized Anxiety Disorder (F41.1)  309.81 Posttraumatic Stress Disorder (F43.10)  Alcohol use Disorder.      Plan / Need for Future Services:  Return for group therapy on weekly basis.  Start seeing Dr. Pratt for individual therapy as an adjunct to group.  Venita Lares, PhD

## 2019-10-05 NOTE — ADDENDUM NOTE
[facility]: 10/5/2019 6:01 AM   Actions taken: Episode edited, Clinical Note Signed

## 2019-10-05 NOTE — PROGRESS NOTES
Shawn Camejo  MRN: [id number]        [facility] Treatment - Integrated Weekly Progress Summary and Treatment Plan Review         I. Group Session Attendance & Engagement       Monday                                               Group Date: 9/30/2019   Group Attendance Attended group session.   Group Therapy Type Addiction.    Group Topic Covered Disease of Addiction/Choices in Recovery. Recovery Attitude w/ self-rating. Passivity & Self-sabotage in recovery.   Client Participation/Contribution Shared personal experience with topic. Indicates need for increased understanding & application. Demonstrated willingness to change.   Client Participation Detail Highly involved.   Attendance 3.0 Hours   Individual Attendance None   Family Attendance None   Other Comments/Information Group members were provided psychoeducation focus on recovery attitudes and passivity in recovery. Group processed powerlessness over substance use, compliance vs surrender, and reviewed 8 recovery attitudes, each selecting 2-3 attitudes to focus on.   This client expressed much interest in discussion on powerlessness and focus on powerlessness over substance use, not powerless over recovery.      Wednesday                                               Group Date: 10/02/2019   Group Attendance Attended group session   Group Therapy Type Addiction. Life skills.   Group Topic Covered Boundaries, Communication, Coping skills, Disease of Addiction/Choices in Recovery: Passivity, Action Steps, Powerlessness, Acceptance.   Client Participation/Contribution Shared personal experience with topic. Indicates need for increased understanding & application   Client Participation Detail Highly involved   Attendance 3.0 Hours   Individual Attendance None   Family Attendance None   Other Comments/Information Group members reported struggle with symptoms, so psychoeducation on withdrawal, including differentiation of acute and post-acute, durations, and cycles,  including PAWS and risks of relapse. Briefly reviewed recovery attitudes assignment, each member selected one attitude, and reviewed SMART goals to continue at next session.  This client shared that information helped her realize increased urges/cravings linked to hormonal cycles.       Group Session Phases  Attended Comments   Phase 1 Group Sessions: 13    Phase 2 Group Sessions:     Phase 3 Group Sessions:       Individual Sessions:  1     Individual Sessions: OCTAVIANO  1              II. Weekly Progress Report         ASAM Dimension & Risk Factors: Weekly Summary Update      (Note the rationale for risk rating changes)      Dimension  I   Acute Intoxication / Withdrawal Potential   Issues: Last date of use: 7/27/2019  Client did not rate current cravings/urges to use. He reported symptoms of PAWS, primarily difficulty concentrating.    Status: Client reports taking Naltrexone as prescribed to help with cravings to use. Client provided information on cycles of PAWS symptoms and increased risks of relapse, noting more than 2 months sobriety re: alcohol use and increased risk.   Plan: Client will be encouraged to monitor cravings/urges to use, symptoms of PAWS, and identify strategies to help minimize risks.   Risk Factor: 0     Dimension  II   Biomedical Conditions & Complications   Issues: Medications: Naltrexone (DEPADE/REVIA) 50 MG tablet, sertraline (ZOLOFT) 100 MG tablets  Client denies current medication issues.  Client rates his overall health at 7/10; he denies any changes.    Status: Client reports some self-care activities, notes interest in more exercise.   Plan: Client will be encouraged to increase self-care activities, including issues with sleep, healthy eating, and daily structure.    Risk Factor: 0     Dimension  III   Emotional/Behavioral/Cognitive   Issues: Current & historical diagnosis: Other Specified Disruptive, Impulsive-Control, and Conduct Disorder (hypersexuality) (F91.8), Major Depressive  Disorder, mild, recurrent (F33.0), Generalized Anxiety Disorder (F41.1), Posttraumatic Stress Disorder (F43.10)  Client denies SI, Intent, or thoughts of self-harm to self or others.    Current Status: Client reports difficulty concentrating, sense of numbness or detachment. Client rates additional symptoms, including: sleeplessness (3), mood swings (7), excessive sleep (8), fatigue (7), irritability (7), racing thoughts (5), sadness (5), and hopelessness (7).  In particular, client reports struggle with symptoms of depression during current review period.  Client reports frequently feeling overwhelmed, including with responsibilities/tasks.    Plan: Reports scheduled appointment for October with psychiatrist.   Continue meeting with therapist, attending group sessions, and following all recommendations from MH providers.   Learn and implement strategies to increase frustration tolerance, to decrease feelings of overwhelm, and to increase social connections, including sober support network.   Risk Factor: 2     Dimension  IV   Readiness to Change   Issues: Client presents in Action Stage of change, as evidenced by: sustaining sobriety from alcohol use for more than 60 days, attending IOP treatment group sessions, attending sober support meetings, and following recommendations from MH providers. Client presents with internal motivation to change, reporting motivation for self-care, desire to decrease persistent distress.    Current Status: Client rates motivation for sobriety/recovery at 7/10.   Main motivation for sobriety is self-care; notes blocks to motivation include anxiety and triggers. Client presents with significant struggle for motivation to attend sober support meetings and to gain a sponsor; client reports struggle with isolation.    Plan: Client will continue to be encouraged to focus on treatment plan assignments, attend sober support meetings, and to address discrepancies between stated motivation and  "commitment for action.   Client will be encouraged to set SMART goals for manageable actions to connect with sober support.    Risk Factor: 1     Dimension  V   Relapse Potential   Issues: (Assess specific risks and triggers for relapse by Dimensions I, II, III, IV, VI)  Client has co-occurring MH concerns that increase risk of relapse;  has history and continuing tendency to isolate; lacks sober support network.   Client reports frequently feeling overwhelmed, and continuing emotional lability. Client reports that he finds it difficult to ask for help or to reach out to others.    Current Status: Client reports current motivation for sobriety at 7/10; denies attending sober support meetings during current review period, stating that he has a busy schedule.  Client reports that attending two recovery treatment outpatient programs has helped him, and continuing need for external structure and accountability.    Plan:  Client will be encouraged to attend sober support meetings and gain a sponsor.   Client will be provided information on strategies to help increase confidence and reduce anxiety for connecting with sober support and build sober support network.   Client will be provided information on mindfulness strategies.   Client will meet with counselor and update treatment plan to focus on specific relapse prevention strategies for current concerns.   Risk Factor: 3     Dimension VI   Recovery Environment   Issues: Client reports that he is employed, and notes that he likes job because minimal stress, but when feeling overwhelmed, he reports job is stressful, and feels triggered to use.   Client has limited social activities, limited support from family. Client reports does not feel sense of spirituality or belief in higher power.    Status: Client rates current living situation at 4/10, with 10 being \"very stressful.\" Client states that he is \"not sure about sober house.\" Client reports significant struggle " reaching out to peers, attending meetings, or engaging in social activities.    Plan: Client will be encouraged to address obstacles to building sober support network that will help him create a healthy recovery lifestyle.    Risk Factor: 2         III. Treatment Plan Review      Treatment Plan Review Date:  9/30/2019   Project discharge date: 11/21/2019   Staff member contributing: Lucille Castellanos MFA, MA, EKTA   Received supervision: No   Treatment coordination activities: Yes: Holger Braga, HARPREET, EKTA    Client Stage of Change: Action - Appears cooperative, motivated, and engaged   Client preferred learning style: Visual, Hands on, Verbal, Demonstration                                                  Physical and mental health problems:      MH DSM-V Diagnosis:   (Diagnosed by [facility] Psychologist Lux Bravo, PhD)  Other Specified Disruptive, Impulsive-Control, and Conduct Disorder (hypersexuality) (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       Dimensions Active Objectives / Goals   Dimension I:   Withdrawal/Intoxication Develop effective strategies to maintain sobriety     Dimension II:  Biomedical Conditions/Complications   Obtain a physical evaluation.   Dimension III:  Emotional-Behavioral  Cognitive Client will learn and utilize coping skills learned in the program to manage symptoms of depression and anxiety more effectively, reducing his PHQ9 score from 10 <6.   Dimension IV:  Readiness to Change Increase awareness with how substance use is conflicted with personal values and address any ambivalence to change      Dimension V:  Relapse Potential Increase awareness of the disease concept of addiction and insight into personal relapse process, triggers and strategies to address     Dimension VI:  Recovery Environment Expand sober support network and be involved in sober activities.         Treatment Plan Development and Modifications Yes No   Client involvement with treatment planning: X            Client contributed to goals and plan: X            Client received copy of plan/revised plan: X            Client agrees with plan/revised plan: X     Changes made to Treatment Plan:   X   New Goals added since last review:   Client will meet with counselor to review and update goals during next review period.      X   Goals worked on since last review:  Client reports working on assignment re: one year sobriety vs using, planning a visual art project.   During this review period, client addressed DIM IV Goals, including: addressed ambivalence to change during group process on recovery attitudes; Dim V Goals, including: increasing awareness of disease concept of addiction and insight into relapse triggers.            DIAP:  Data: (Need to include short narrative for the week on what was worked on)  In group session, client shared that information on symptoms of PAWS was very helpful, and had not previously known about patterns of increased symptoms, nor about increased symptoms with other conditions/issues. Client reported that the information made him feel more hopeful because he could now understand that over time symptoms will stop. Client noted that he has had decreased motivation, and questioned if symptoms were currently affecting him.     Intervention:  Counselor encouraged client to monitor symptoms, including others stressors, and determine any pattern or particular triggers/risks that exacerbate symptoms. Counselor used therapeutic modalities, including: Psychoeducation, Cognitive Behavioral Therapy,  and Motivational  Enhancement Therapy.      Assessment:    Client presented this week with openness to learning new information, and willingness to share feelings with counselor and group. Client reports some decreased motivation this week, with increased stressors, and presents with sense of hopelessness and passivity for his recovery.   Plan:   Counselor will continue to provide psychoeducation and clinical guidance for client to gain hope for sobriety and build healthy recovery.          IV. Review and Evaluation of the Individual Abuse Prevention Plan (IAPP)     Client risk factors have been reviewed and evaluated: The [facility] program's individual abuse prevention plan (IAPP) is sufficient for this client. Yes         Lucille Castellanos MFA, MA, [facility]

## 2019-10-07 NOTE — ADDENDUM NOTE
Encounter addended by: Lucille Castellanos LADC on: 10/7/2019 1:40 PM   Actions taken: Episode edited

## 2019-10-08 ENCOUNTER — HOSPITAL ENCOUNTER (OUTPATIENT)
Dept: BEHAVIORAL HEALTH | Facility: CLINIC | Age: 39
End: 2019-10-08
Attending: SOCIAL WORKER
Payer: COMMERCIAL

## 2019-10-08 PROCEDURE — H2035 A/D TX PROGRAM, PER HOUR: HCPCS | Mod: HQ

## 2019-10-09 ENCOUNTER — OFFICE VISIT (OUTPATIENT)
Dept: OTHER | Facility: OUTPATIENT CENTER | Age: 39
End: 2019-10-09
Payer: COMMERCIAL

## 2019-10-09 DIAGNOSIS — F10.21 ALCOHOL USE DISORDER, MODERATE, IN EARLY REMISSION, DEPENDENCE (H): ICD-10-CM

## 2019-10-09 DIAGNOSIS — F43.10 POSTTRAUMATIC STRESS DISORDER: ICD-10-CM

## 2019-10-09 DIAGNOSIS — F41.1 GENERALIZED ANXIETY DISORDER: ICD-10-CM

## 2019-10-09 DIAGNOSIS — F91.8 OTHER CONDUCT DISORDERS: Primary | ICD-10-CM

## 2019-10-09 DIAGNOSIS — F33.0 MAJOR DEPRESSIVE DISORDER, RECURRENT EPISODE, MILD (H): ICD-10-CM

## 2019-10-09 NOTE — PROGRESS NOTES
[facility]   Group Progress Note    Date of Service: 10/09/19  Client Name: Shawn Camejo  YOB: 1980  MRN:  [id number]  Treating Provider: Venita Lares, PhD  And Shireen Pratt, PhD  Type of Session: Group  Number of Minutes: 120    Health Maintenance Summary - Mental Health Treatment Plan       Status Date      MENTAL HEALTH TX PLAN Overdue 8/28/2019      Done 9/28/2018         Current Symptoms/Status:  The client meets criteria for Other Specified Disruptive, Impulsive-Control, and Conduct Disorder (hypersexuality), which includes behavioral symptoms that cause clinically significant distress and impair social functioning. He has continued to struggle staying within boundaries but he also is unsure of what boundaries to set for himself.     The client meets criteria for Major Depressive Disorder, Recurrent Episode, Mild, which includes experiencing anhedonia, worthlessness, hypersomnia, fatigue, and a depressed mood more days than not for a period of at least two weeks.  Symptoms persist.     Client endorses the following anxiety symptoms: difficulty controlling worry; restlessness or feeling keyed up or on edge; being easily fatigued; difficulty concentrating or mind going blank; irritability; muscle tension; and sleep disturbance (difficulty falling or staying asleep, or restless unsatisfying sleep).  Symptoms persist.     The client meets criteria for Posttraumatic Stress Disorder, which includes dissociative reactions, persistent avoidance of distressing memories, negative alternations in cognitions and mood associated with the event, and marked alterations in arousal and reactivity associated with the event. The symptoms have been present for at least one month and cause clinically significant distress or impairment.  Symptoms persist.    CSB: Stayed within boundaries!    Alcohol Use Disorder:  Didn't drink. Went to two AA groups and his [facility] group.    Depression:  Still feeling  depressed but feeling a bit more hopeful.    Progress Toward Treatment Goals:   Was able to stay within boundaries. Minor violation, viewed escort ads    Intervention: Modality and Response:  Utilized supportive and CBT techniques were used to address CSB and related mental health issues.    The client still sober, he is not gambling and he had a minor violation, viewed escort ads.    He still had a problem with his car, some disappointments with that (creating some stress). He could see a bright side to this story when he decided to walk to work. He noted some satisfaction and that also helped him to think about his problems.    He reported that felt bit more depressed this week. However, he remained stable with many positive coping.    Some family trauma still haunting him, he finds it very difficult to talk about it. It was encouraged by the group to try it. Family enneagram was considered as a strategy.    He remains positive and supportive in his interactions with the group.     Assignment:  Stay within defined boundaries, keeping enduring himself with daily challenges and paying attention to his needs.   Working on Family enneagram.    Interactive Complexity:  N/A      Diagnosis:  312.89 Other Specified Disruptive, Impulsive-Control, and Conduct Disorder (hypersexuality) (F91.8)  296.31 Major Depressive Disorder, mild, recurrent (F33.0)  300.02 Generalized Anxiety Disorder (F41.1)  309.81 Posttraumatic Stress Disorder (F43.10)  Alcohol use Disorder.      Plan / Need for Future Services:  Return for group therapy on weekly basis.  Start seeing Dr. Pratt for individual therapy as an adjunct to group.  Venita Lares, PhD

## 2019-10-10 ENCOUNTER — OFFICE VISIT (OUTPATIENT)
Dept: OTHER | Facility: OUTPATIENT CENTER | Age: 39
End: 2019-10-10
Payer: COMMERCIAL

## 2019-10-10 VITALS
WEIGHT: 211 LBS | BODY MASS INDEX: 30.21 KG/M2 | SYSTOLIC BLOOD PRESSURE: 124 MMHG | HEART RATE: 72 BPM | HEIGHT: 70 IN | DIASTOLIC BLOOD PRESSURE: 80 MMHG

## 2019-10-10 DIAGNOSIS — F91.8 OTHER CONDUCT DISORDERS: ICD-10-CM

## 2019-10-10 RX ORDER — SERTRALINE HYDROCHLORIDE 100 MG/1
100 TABLET, FILM COATED ORAL DAILY
Qty: 90 TABLET | Refills: 0 | Status: SHIPPED | OUTPATIENT
Start: 2019-10-10 | End: 2019-12-04

## 2019-10-10 RX ORDER — NALTREXONE HYDROCHLORIDE 50 MG/1
TABLET, FILM COATED ORAL
Qty: 90 TABLET | Refills: 3 | Status: SHIPPED | OUTPATIENT
Start: 2019-10-10 | End: 2019-12-04

## 2019-10-10 ASSESSMENT — ANXIETY QUESTIONNAIRES
1. FEELING NERVOUS, ANXIOUS, OR ON EDGE: SEVERAL DAYS
5. BEING SO RESTLESS THAT IT IS HARD TO SIT STILL: NOT AT ALL
2. NOT BEING ABLE TO STOP OR CONTROL WORRYING: SEVERAL DAYS
GAD7 TOTAL SCORE: 4
7. FEELING AFRAID AS IF SOMETHING AWFUL MIGHT HAPPEN: SEVERAL DAYS
3. WORRYING TOO MUCH ABOUT DIFFERENT THINGS: NOT AT ALL
6. BECOMING EASILY ANNOYED OR IRRITABLE: SEVERAL DAYS

## 2019-10-10 ASSESSMENT — MIFFLIN-ST. JEOR: SCORE: 1868.34

## 2019-10-10 ASSESSMENT — PATIENT HEALTH QUESTIONNAIRE - PHQ9
SUM OF ALL RESPONSES TO PHQ QUESTIONS 1-9: 12
5. POOR APPETITE OR OVEREATING: NOT AT ALL

## 2019-10-10 NOTE — NURSING NOTE
"Chief Complaint   Patient presents with     Medication Follow-up       Vitals:    10/10/19 1819   BP: 124/80   Pulse: 72   Weight: 95.7 kg (211 lb)   Height: 1.778 m (5' 10\")       Body mass index is 30.28 kg/m .      Roopa Meléndez CMA    "

## 2019-10-10 NOTE — PATIENT INSTRUCTIONS
1)Medications: No change for now.  If you notice that your mood is getting lower over the Fall/Winter, let me know.  We may need to increase the sertraline.     2)Vitamin D: Take 2000 units/day (available over the counter at pharmacy).      3)See me in 2 months

## 2019-10-10 NOTE — PROGRESS NOTES
[facility] - Med Management    Name:  Shawn Camejo   Aliases:    :  1980   MRN:  [id number]  Treating Provider: Bhargav Nolasco PA-C EdD     Medications at start of visit:  Outpatient Medications Prior to Visit   Medication Sig Dispense Refill     naltrexone (DEPADE/REVIA) 50 MG tablet Take 1 tablet (50mg) once daily 30 tablet 3     sertraline (ZOLOFT) 100 MG tablet Take 1 tablet (100 mg) by mouth daily 90 tablet 0     Facility-Administered Medications Prior to Visit   Medication Dose Route Frequency Provider Last Rate Last Dose     Self Administer Medications: Behavioral Services   Does not apply See Admin Instructions Ilya Moralez MD           Allergies:  Allergies   Allergen Reactions     No Known Allergies        Today's Visit    Current Complaint: Shawn presents for a recheck.  At their last appointment no changes were made to his regimen. He is reporting overall stability of psychiatric sx.  He is reporting good efficacy of the naltrexone and has experienced no boundary violations. The patient is not endorsing suicidal/homicidal ideation, active planning, intent or means.  The patient is not endorsing s/s suggestive of hypomania/jesus or psychosis.  The patient is endorsing good compliance with and efficacy of their medication regimen without s/e.       Review of Systems: A review of the following systems was negative: Opthalmic, ENT, Cardiovascular, Gastrointestinal, Genitourinary, Musculoskeletal, Integumentary, Neurological, Endocrine, Respiratory, Hematologic, Immunologic      Chemical History: Denies usage of and cravings for alcohol, cannabis, heroin, methamphetamine, cocaine, prescription opioids/benzodiazepines.      Social History: No interval change    Mental Status Exam: The patient's orientation, memory,  Attention, language and fund of knowledge are at their usual best baseline.  Grooming/Hygiene: adequate  Eye Contact: normal  Psychomotor: normal  Observed mood and affect:  appropriate  Judgment: intact, with thoughtful decision making and insight  Speech: normal rate, rhythm, tone and volume  Thought processes: normal, with normal rate of thought  Associations:  No deficiency  Abnormal Thoughts: none      Assessment: This is a 38 yo male who carries the diagnosis of FERNANDEZ, PTSD. The patient's questions were answered to their satisfaction regarding the plan as outlined below. Side effects, risks/benefits/alternatives regarding all psychiatric medications were discussed with the patient in detail.          Plan:    Patient Instructions   1)Medications: No change for now.  If you notice that your mood is getting lower over the Fall/Winter, let me know.  We may need to increase the sertraline.     2)Vitamin D: Take 2000 units/day (available over the counter at pharmacy).      3)See me in 2 months       Total face-to-face time: 30 min    Counseling/Coordination of care greater than 50%.    Counseling/Coordination of care included: Educational counseling regarding psychiatric medications, side effects, interactions.

## 2019-10-11 ASSESSMENT — ANXIETY QUESTIONNAIRES: GAD7 TOTAL SCORE: 4

## 2019-10-15 ENCOUNTER — HOSPITAL ENCOUNTER (OUTPATIENT)
Dept: BEHAVIORAL HEALTH | Facility: CLINIC | Age: 39
End: 2019-10-15
Attending: SOCIAL WORKER
Payer: COMMERCIAL

## 2019-10-15 PROCEDURE — H2035 A/D TX PROGRAM, PER HOUR: HCPCS | Mod: HQ

## 2019-10-15 NOTE — PROGRESS NOTES
Skills Group Progress Note   Shawn Camejo  [id number]     Tuesday     Group Date: 10/15/2019   Group Attendance Attended group session   Group Therapy Type Addiction, Life skill(s) and Psychoeducation   Group Topic Covered Co-occurring Illness, Coping Skills/Lifestyle Management, Meditation/Breathing Exercises, Mental Health Skills, Mindfulness, Relaxation Techniques and Self-Care Activities   Client's Response To Group Topic Cooperative with task Discussed personal experience with topic Expressed understanding of topic Listened actively Offered helpful suggestions to peers   Client Group Participation Detail Highly involved   Group Attendance (Time) 3.0 Hours   Individual Attendance None   Family Attendance None   Other Comments/Information None     DIMENSION 3:  Emotional/Behavioral/Cognitive Conditions and Complications  The degree to which any condition or complications are likely to interfere with treatment for substance abuse or with function in significant life areas and the likelihood of risk of harm to self or others.     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    Latest PHQ-9: Client scored 8/27 and indicated the symptoms are; somewhat difficult to their daily living.  Latest FERNANDEZ-7: Client scored 8/21 and indicated the symptoms are; somewhat difficult to their daily living.    >  "Client reports that he sees his psychologist and psychiatirst at [facility] regularily (weekly) as part of the [facility].     > Client reports taking the following medications;    Current Outpatient Medications   Medication     naltrexone (DEPADE/REVIA) 50 MG tablet     sertraline (ZOLOFT) 100 MG tablet     No current facility-administered medications for this encounter.      Facility-Administered Medications Ordered in Other Encounters   Medication     Self Administer Medications: Behavioral Services     > Client reports his risk factors are; \"cross addictions, lack of sober support, isolation, co-occurring disorders, financial & family stressors\".    > Client reports his protective factors are; \"community  providers in place and sees regularly, healthy fear of getting hurt, employment, asks for help when needed and knows how to use safety plan effectively\".     Data: Client reports feeling the following two emotions today: 1. \"Content\" and 2. \"Grateful.\" Client reported stuggling with \"work stress and cravings\" in the past 24-hours. Client reports using the following coping skill(s) to address the struggle; \"playing the tape forward and thinking about my accomplishments with recovery\". Client reported being grateful for 1. \"being in recovery\" and 2. \"this group\". Client denied having any SI/SIB's/SA's/HI's at this time.    Intervention: Counselor/therapist used Cognitive Behavioral Therapy, ACT, Counselor Feedback, Education, Emotional management, Group Feedback, Mindfulness, Motivational Enhancement Therapy, Relapse Prevention, Twelve Step Facilitation, DBT and REBT.  > Client practiced the following skills in group session(s) this week; mindfulness based stress reduction skills, along with breathing exercises. Writer provided client with verbal interventions including: validation, support, encouragement, and psycho-education.     Mental Health Coping Skills Group:     > " "Client also learned and practiced the following skills in group sessions this week; Client was taught the importance of social support as it relates to coping skills and more specifically \"Daily Social Support\" and \"Crisis Social Support\". Client was taught the \"Diversion\" coping skill and client identified situation where he could use this coping skill. Client was taught the \"Building New Habits\" coping skill as it relates to \"fostering new relationships, developing new professional skills and refocusing on existing relationships\". Client was then taught the \"Prevention\" coping skill. Client was taught about \"Avoiding Triggers/Risky Situations\" and \"Establishing a Healthy Lifestyle\". Client learned about the importance of \"Managing Emotions and Relaxation\" as it relates to coping. Client was then taught \"Deep Breathing (4-4-6)\" coping skill and client practiced using this skill in group for 3-5 minutes. Client was taught how to determine if they are breathing appropriately by placing a hand on his stomach to ensure he is breathing with belly breaths. Client was then taught the \"Journaling\" coping skill. Client was introduced to \"Therapeutic Journaling Guidelines\" and \"30 Journaling Prompts for Self-Discovery\". Client practiced \"daily log, letter writing, and gratitude\" journaling in group. Client then learned about the \"Imagery\" coping skill and practiced this skill in group for 5-minutes.   > Client was then taught about \"Healthy vs. Unhealthy Coping Strategies\". Client (group activity) identified the following unhealthy coping skills; \"drug or alcohol use, overeating, procrastination, sleeping too much or too little, social withdrawal, self-harm, and aggression\". Client along with fellow group members came up with the following healthy coping skills; \"exercise, talking about your problem/craving, healthy eating, seeking professional help, relaxation techniques (imagery, progressive muscle relaxation, and " "breathing exercises), using social support, and problem-solving techniques (including playing tape forward, pro's and con's list, and riding out the craving)\". Client then practiced writing out a \"Healthy vs. Unhealthy Coping Strategies\" decision making tree.   > Client was then taught \"How To [place] - Improving Coping Skills\". Client then learned about the following questions related to the aforementioned topic; \"So what are coping skills anyway?, Is there a wrong way to cope?, Psychological Stressors, Coping with Stressors, Improving Coping Skills, and At a loss as to what to do?\".  Client was then given a list of \"Coping Idea's\" that relate to the following areas; \"Things to Distract, Things to Soothe and Calm, Things to Help Express the Pain and Deep Emotion, Things to Help Release Physical Tension and Distress, and Things to Help Feel Supported and Connected.\" Client was then given a listing of \"101 Ways to [place] With Stress\" and circled the coping skills he already uses and underlined the coping skills he would like to try.   > Client was then taught more detailed \"Deep Breathing\" skills and/or techniques such as; \"Box Breathing/4-Squared (4x4x4), Cool Air In & Warm Air Out, Abdominal Breathing, Breathing for Stressful Situations, Breathing for Relaxation While in Traffic, Breathing to Aid Sleep, Breathing Rhythmically with Relaxation, and When Ready to End any Breathing Exercise.\"      Assessment:    > Client was observed using the body scan and reported it helped with relaxation and anxiety. Client was observed using breathing exercises that were covered again during this weeks group sessions and client reported it helped with relaxation. Client appeared very open to continued learning for lifelong sobriety.     > Client was able to \"teach-back\" the skills he learned on the Mental Health Coping Skills Group by identifying \"25 Coping Skills\" that he will use regularly, completing a \"Coping Skills Alphabet\" " "exercise, and then client completed a detailed \"Coping Skills Plan\". Client identified the following things in the following categories of his \"Coping Skills Plan\";    Client identified 3 people who are good supporters.    Client identified 3 things that may contribute to returning to use or unskillful behaviors (both mental health and/or substance use).    Client identified 3 warning signs that signal to others or to him that he isn't at his best.    Client identified that when feeling \"depressed or down\" 3 ways he can deal with this feeling along with 3 ways of dealing with the feeling of \"anxiousness\".     Client identified 3 things he can do or places he can go to feel better.    Client identified 3 person/places/things that he needs to stay away from.    Client identified 3 symptoms that other people can look for that signal things aren't going well.    Client identified 3 ways that other people can support him when symptoms return or get worse.    Client identified 3 things that would take place that would signal that he needed emergency assistance or hospitalization.      Client appeared to gain insight into the importance of identifying, practicing and using coping skills regularly to avoid daily stress and to live a life in lifelong recovery. Client along with fellow group members role played with each other how they would utilize their self identified coping skills. Client then shared with fellow group members his list of \"25 Coping Skills\" and \"Coping Skills Alphabet\". Client received feedback on his \"Coping Skills Plan\" along with his list of \"25 Coping Skills\" and \"Coping Skills Alphabet\". Client responded to all the aforementioned things he learned in today's group very favorably and reported feeling confident that he would actually utilize his self identified coping skills to gain a higher level of mastery. Client also verbalized his understanding of today's material and was able to demonstrate " appropriately the use of the various breathing exercises coping skills.      > Client Appears/Sounds: Cooperative, Motivated, and Engaged.     > Client appears to be in the Action Stage within the Stages of Change Model.      Plan:   -Attend 2-3 sober support group meetings each week (this includes either 12-step or non-12-step/AA alternative meetings).  -Client will meet with his therapist once per week. ([facility])  -Client will continue to work on treatment plan objectives.  -Client will follow all recommendations of counselor and any other medical provider which includes taking all medications as prescribed.    -Client will attend all weekly Phase 1 group sessions.   -Client to engage in sober activities each week.     Holger Braga MA, LMFT, [facility]  Licensed Psychotherapist

## 2019-10-16 ENCOUNTER — HOSPITAL ENCOUNTER (OUTPATIENT)
Dept: BEHAVIORAL HEALTH | Facility: CLINIC | Age: 39
End: 2019-10-16
Attending: SOCIAL WORKER
Payer: COMMERCIAL

## 2019-10-16 PROCEDURE — H2035 A/D TX PROGRAM, PER HOUR: HCPCS | Mod: HQ

## 2019-10-16 ASSESSMENT — ANXIETY QUESTIONNAIRES
2. NOT BEING ABLE TO STOP OR CONTROL WORRYING: SEVERAL DAYS
6. BECOMING EASILY ANNOYED OR IRRITABLE: MORE THAN HALF THE DAYS
GAD7 TOTAL SCORE: 8
7. FEELING AFRAID AS IF SOMETHING AWFUL MIGHT HAPPEN: MORE THAN HALF THE DAYS
4. TROUBLE RELAXING: SEVERAL DAYS
5. BEING SO RESTLESS THAT IT IS HARD TO SIT STILL: NOT AT ALL
IF YOU CHECKED OFF ANY PROBLEMS ON THIS QUESTIONNAIRE, HOW DIFFICULT HAVE THESE PROBLEMS MADE IT FOR YOU TO DO YOUR WORK, TAKE CARE OF THINGS AT HOME, OR GET ALONG WITH OTHER PEOPLE: SOMEWHAT DIFFICULT
1. FEELING NERVOUS, ANXIOUS, OR ON EDGE: SEVERAL DAYS
3. WORRYING TOO MUCH ABOUT DIFFERENT THINGS: SEVERAL DAYS

## 2019-10-16 ASSESSMENT — PATIENT HEALTH QUESTIONNAIRE - PHQ9: SUM OF ALL RESPONSES TO PHQ QUESTIONS 1-9: 8

## 2019-10-17 ENCOUNTER — OFFICE VISIT (OUTPATIENT)
Dept: OTHER | Facility: OUTPATIENT CENTER | Age: 39
End: 2019-10-17
Payer: COMMERCIAL

## 2019-10-17 DIAGNOSIS — F91.8 OTHER CONDUCT DISORDERS: Primary | ICD-10-CM

## 2019-10-17 DIAGNOSIS — F43.10 POSTTRAUMATIC STRESS DISORDER: ICD-10-CM

## 2019-10-17 DIAGNOSIS — F10.21 ALCOHOL USE DISORDER, MODERATE, IN EARLY REMISSION, DEPENDENCE (H): ICD-10-CM

## 2019-10-17 DIAGNOSIS — F33.0 MAJOR DEPRESSIVE DISORDER, RECURRENT EPISODE, MILD (H): ICD-10-CM

## 2019-10-17 DIAGNOSIS — F41.1 GENERALIZED ANXIETY DISORDER: ICD-10-CM

## 2019-10-17 ASSESSMENT — ANXIETY QUESTIONNAIRES: GAD7 TOTAL SCORE: 8

## 2019-10-17 NOTE — PROGRESS NOTES
[facility] -  Case Progress Note    Date of Service: 10/17/19  Client Name: Shawn Camejo  YOB: 1980  MRN:  [id number]  Treating Provider: Shireen Pratt, PhD, Postdoctoral Fellow  Type of Session: Individual  Present in Session: Patient only  Number of Minutes: 53    Health Maintenance Summary - Mental Health Treatment Plan       Status Date      MENTAL HEALTH TX PLAN Next Due 8/11/2020      Done 9/11/2019 [facility] MENTAL HEALTH TX PLAN SCAN     Done 9/28/2018         Current Symptoms/Status:  The client meets criteria for Other Specified Disruptive, Impulsive-Control, and Conduct Disorder (hypersexuality), which includes behavioral symptoms that cause clinically significant distress and impair social functioning. There appears to be evidence that client's behaviors are sexually compulsive in nature.     The client meets criteria for Major Depressive Disorder, Recurrent Episode, Mild, which includes experiencing anhedonia, worthlessness, hypersomnia, fatigue, and a depressed mood more days than not for a period of at least two weeks.    Client endorses the following anxiety symptoms: difficulty controlling worry; restlessness or feeling keyed up or on edge; being easily fatigued; difficulty concentrating or mind going blank; irritability; muscle tension; and sleep disturbance (difficulty falling or staying asleep, or restless unsatisfying sleep).    The client meets criteria for Posttraumatic Stress Disorder, which includes dissociative reactions, persistent avoidance of distressing memories, negative alternations in cognitions and mood associated with the event, and marked alterations in arousal and reactivity associated with the event. The symptoms have been present for at least one month and cause clinically significant distress or impairment.    The client meets criteria for Alcohol Use Disorder (Moderate), which includes consuming alcohol in larger amounts than intended, giving up  social/recreational activities due to use, continued use despite negative consequences (eg, CSB), and unsuccessful efforts to cut down alcohol use.      Progress Toward Treatment Goals:   Session # 2 - Client continues to show interest and commitment towards receiving services at [facility].  Client joined CSB group 4/20/19. Remains motivated to continue with group sessions.  He is in an AA treatment group at [facility].  No kept his sexually boundaries. Yes risk activities.  No alcohol consumption.       Intervention: Modality and Description:  Individual, interpersonal, CBT. The focus of today's session was supporting him with his depression symptoms and his self-hatred and guilt/shame about his past and current life situation. He still needs assistance to keeping his sexually boundaries.    Response to Intervention:  He reported that felt more depressed and he had a lot of negative coping this week. He still feels very lonely and he texted his old sex worker friend. He was tempted to meet her for a date, but he acknowledged that this is not good for him. He reported that it is still very difficult to deal with his negative states inside his head. He acknowledged that he must stop the flow of thoughts that direct him to guilt and shame. He was not able to stay within sexually boundaries, he had intense urge to have paid sex. He also had intense urge to masturbation or fantasying.    Assignment:  - Stay within defined boundaries, keeping enduring himself with daily challenges and paying attention to his needs.   - Continue attending CSB and AA group.  - Use the depression symptom charts to review the notes made on his journal.    Interactive Complexity:  N/A      Diagnosis:  312.89 (F91.8) Other Specified Disruptive, Impulsive-Control, and Conduct Disorder (hypersexuality)   296.31 (F33.0) Major Depressive Disorder, mild, recurrent   300.02 (F41.1) Generalized Anxiety Disorder    309.81 (F43.10) Posttraumatic Stress Disorder   303.90 (F10.20) Alcohol Use Disorder (Moderate)       Plan / Need for Future Services:  Return for individual therapy every week to address CSB. Continue weekly group therapy.     Shireen Pratt, PhD, Postdoctoral Fellow
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
## 2019-10-22 ENCOUNTER — HOSPITAL ENCOUNTER (OUTPATIENT)
Dept: BEHAVIORAL HEALTH | Facility: CLINIC | Age: 39
End: 2019-10-22
Attending: SOCIAL WORKER
Payer: COMMERCIAL

## 2019-10-22 PROCEDURE — H2035 A/D TX PROGRAM, PER HOUR: HCPCS | Mod: HQ

## 2019-10-23 NOTE — PROGRESS NOTES
MH Skills Group Progress Note   Shawn Camejo  [id number]    Attendance:      Tuesday     Group Date: 10/22/2019   Group Attendance Attended group session   Group Therapy Type Addiction, Life skill(s) and Psychoeducation   Group Topic Covered Balanced Lifestyle, Coping Skills/Lifestyle Management, Journaling, Leisure Exploration/Use of Leisure Time, Meditation/Breathing Exercises, Mental Health Skills, Mindfulness, Relaxation Techniques, Stress Management and Time Management   Client Participation/Contribution Cooperative with task Discussed personal experience with topic Expressed understanding of topic Listened actively Offered helpful suggestions to peers   Client Participation Detail Highly involved   Attendance 3.0 Hours   Individual Attendance None   Family Attendance None   Other Comments/Information During group client reported a use episode and reports his new sober date is 10/14 for alcohol. Co-Counselor notified and will update [place] plan accordingly.       DIMENSION 3:  Emotional/Behavioral/Cognitive Conditions and Complications  The degree to which any condition or complications are likely to interfere with treatment for substance abuse or with function in significant life areas and the likelihood of risk of harm to self or others.     Emotional/Behavioral - Current Risk Factor:  2    DSM-5 MH Diagnoses:   Client has the following mental health diagnoses that was diagnosed by 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    Latest PHQ-9: Client scored 12/27 and indicated the symptoms are; somewhat  "difficult to their daily living.  Latest FERNANDEZ-7: Client scored 6/21 and indicated the symptoms are; not difficult at all to their daily living.    > Client reports that he sees his psychologist and psychiatirst at [facility] regularily (weekly) as part of the [facility].     > Client reports taking the following medications;  Zoloft and Naltrexone.     > Client reports his risk factors are; \"cross addictions, lack of sober support, isolation, co-occurring disorders, financial & family stressors\".    > Client reports his protective factors are; \"community  providers in place and sees regularly, healthy fear of getting hurt, employment, asks for help when needed and knows how to use safety plan effectively\".     Data: Client reports feeling the following two emotions today: 1. \"Irritated with myself for drinking\" and 2. \"ashamed of myself due to drinking a beer.\" Client reported stuggling with \"work stress that has been getting to me as of late\" in the past 24-hours. Client reports using the following coping skill(s) to address the struggle; \"didn't use a healthy coping skill and instead I drank a beer\". Client reported being grateful for 1. \"having a job\" and 2. \"being in recovery\". Client denied having any SI/SIB's/SA's/HI's at this time.    Intervention: Counselor/therapist used Cognitive Behavioral Therapy, ACT, Counselor Feedback, Education, Emotional management, Group Feedback, Mindfulness, Motivational Enhancement Therapy, Relapse Prevention, Twelve Step Facilitation, DBT and REBT.  > Client practiced the following skills in group session(s) this week; mindfulness based stress reduction skills, along with breathing exercises. Writer provided client with verbal interventions including: validation, support, encouragement, and psycho-education.     Mental Health Stress Management Skills Group 1 of 2:    > Client also learned and practiced the following skills in group sessions " "this week; Client was taught about stress and anxiety. Client leanred how stress and anxiety play a big role on his physical health, emotional health, and how it impacts his thoughts. With physical health, client learned what symptoms he might feel such as; muscle tensions, indigestion, difficulty sleeping, various pains, pounding \"racing\" heart, etc. With emotional health, client learned what symptoms he might feel such as; irritability, frustration, restless, humorless, mood swings, emotional outbursts, focusing on the negatives, etc. With thoughts, client learned what symptoms he might feel such as; unable to concentrate, easily confused, memory problems, negative self-talk, etc. Client then identified (identifying stress) the symptoms he experiences in regards to his physical health, emotional health, and what he experiences with thoughts. Client learned about the different sources of stress & anxiety. Client learned about the difference between \"Eustress\" and \"DIstress\". Client learned the importance of positive stress (eustress) and how to check if he is experience positive or negative stress. Client learned about the fight-or-flight part of the brain that is triggered when stress and/or anxiety is present. Client learned how the fight-or-flight response is triggered, symptoms of fight-or-flight, and how to manage the fight-or-flight response.   > Client then learned about stress management tips such as; \"keeping in mind stress isn't a bad thing, talking about problems even if they wont be solved, prioritizing your responsibilities, focusing on the basics, don't put all your eggs in one basket, setting aside time for yourself, and keeping things in perspective\". Client also learned the following stress management tips; \"effective time management skills, exercise, taking up a hobby, helping others, relaxing, eating healthily, balanced sleep, connecting with others, avoiding alcohol or drugs, accepting things as " "they are, seeing the bigger picture, mindfulness skills, breathing skills, relaxation techniques, journaling, reading, mindful meditation, mindful exercising of the five senses, guided imagery, and progressive muscle relaxation.\" Client was provided with a list of \"101 Ways To [place] With Stress\" and client identified on the list the ways he already vic and then identified the coping skills he would like to practice going forward.   > Client then learned about \"Protective Factors\". Client then identified on a scale of \"weak, moderate, and strong\", with how well he is performing is each of the six areas; \"Social Support, Coping Skills, Physical Health, Sense of Purpose, Self-Esteem, and Healthy Thinking\". Client then completed a \"Protective Factor's Plan\".  Client was then taught about different \"Problem Solving Techniques\" and client completed a detailed \"Problem Identification & Solving Technique\". Lastly, client completed a detailed \"Stress Management Plan\".     Assessment:    > Client was observed using the body scan and reported it helped with relaxation and anxiety. Client was observed using breathing exercises that were covered again during this weeks group sessions and client reported it helped with relaxation. Client appeared very open to continued learning for lifelong sobriety.      > Client was able to \"teach-back\" the skills he learned on the Mental Health Stress Management Skills Group by talking about ways he was going to manage stress more positively.       > Client appeared to gain insight into the importance of identifying positive and negative stress and how he can avoid negative daily stress while still being successful with lifelong recovery. Client along with fellow group members role played with each other on practicing using their newly acquired stress management skills. Client then shared with fellow group members his \"Stress Management Plan\". Client received feedback on his \"Stress " "Management Plan\" along with his \"Protective Factor's Plan\". Client responded to all the aforementioned areas of learning in today's group very favorably and reported feeling confident that he would actually utilize his \"Stress Management Plan\". Client also verbalized his understanding of today's material and was able to appropriately demonstrate the use of the various stress management skills/tips that he learned today.      > Client Appears/Sounds: Cooperative, Motivated, and Engaged.     > Client appears to be in the Contemplation (Relapse) Stage within the Stages of Change Model.      Plan:   -Attend 2-3 sober support group meetings each week (this includes either 12-step or non-12-step/AA alternative meetings).  -Client will meet with his therapist once per week. ([facility])  -Client will continue to work on treatment plan objectives.  -Client will follow all recommendations of counselor and any other medical provider which includes taking all medications as prescribed.    -Client will attend all weekly Phase 1 group sessions.   -Client to engage in sober activities each week.     Holger Braga MA, LMFT, [facility]  Licensed Psychotherapist   "

## 2019-10-24 ENCOUNTER — OFFICE VISIT (OUTPATIENT)
Dept: OTHER | Facility: OUTPATIENT CENTER | Age: 39
End: 2019-10-24
Payer: COMMERCIAL

## 2019-10-24 DIAGNOSIS — F43.10 POSTTRAUMATIC STRESS DISORDER: ICD-10-CM

## 2019-10-24 DIAGNOSIS — F91.8 OTHER CONDUCT DISORDERS: Primary | ICD-10-CM

## 2019-10-24 DIAGNOSIS — F33.0 MAJOR DEPRESSIVE DISORDER, RECURRENT EPISODE, MILD (H): ICD-10-CM

## 2019-10-24 DIAGNOSIS — F10.21 ALCOHOL USE DISORDER, MODERATE, IN EARLY REMISSION, DEPENDENCE (H): ICD-10-CM

## 2019-10-24 DIAGNOSIS — F41.1 GENERALIZED ANXIETY DISORDER: ICD-10-CM

## 2019-10-24 NOTE — PROGRESS NOTES
[facility] -  Case Progress Note    Date of Service: 10/24/19   Client Name: Shawn Camejo  YOB: 1980  MRN:  [id number]  Treating Provider: Shireen Pratt, PhD, Postdoctoral Fellow  Type of Session: Individual  Present in Session: Patient only  Number of Minutes: 53 min    Health Maintenance Summary - Mental Health Treatment Plan       Status Date      MENTAL HEALTH [place] PLAN Next Due 8/11/2020      Done 9/11/2019 [facility] MENTAL HEALTH [place] PLAN SCAN     Done 9/28/2018         Current Symptoms/Status:    Compulsive Sexual Behavior: Client was unable to stay within his boundaries. In addition, he had two risky activities. He used pornographic content and masturbated everyday. During the week, he was uncomfortable with his fantasies and he noticed urges to violate his boundaries everyday.    Depression: Hopelessness and a permanent depressive state were the main symptoms. The difficulties of sleeping still continue. ? Self-Esteem/Guilt impaired due to boundaries violation.    Anxiety: Client endorses the following anxiety symptoms: Anxious; Nervous; Worry: difficulties controlling; Irritability; Overwhelming sensation; and Intrusive thoughts.    Alcohol Use Disorder: Client was able to stay within his boundaries. No urges to drink. He did not attend two meetings of AA treatment group.      Progress Toward Treatment Goals:   Session # 3 - Client continues to show interest and commitment towards receiving services at [facility].  Client joined CSB group 4/20/19. Remains motivated to continue with group sessions.  No alcohol consumption.   Continued working on his assignments.        Intervention: Modality and Description:  Individual, interpersonal, CBT. The focus of today's session was to strengthen his boundaries and positive attitudes toward his life. Their daily behaviors and his needs were explored. In addition, the negative state post-relapse in paid sex and gambling was supported and emotionally  worked.    Response to Intervention:  Client reported feeling pressured and negative by himself about having had a compulsive sexual experience with a prostitute and also went to a casino. He described that he realized how mechanical and empty these experiences are and he had never realized this before. He noted that he needs to be more aware of his boundaries and that he will need further help in reviewing them. It was considered that the tripod: paid sex, drinking and gambling go together and this means that he needs to deal with all three together. He explained that this overwhelming him, for now at least he has been able to deal with alcohol. He said he feels distressed that he does not know what to do it in his free time, how to reward himself, and how to establish a lifestyle and sense of life that respects his boundaries.    Assignment:  - Continue attending CSB and AA group (permanent assignment).  - Use the depression symptom charts to review the notes made on his journal (progressing assignment).    Interactive Complexity:  N/A      Diagnosis:  312.89 (F91.8) Other Specified Disruptive, Impulsive-Control, and Conduct Disorder (hypersexuality)   296.31 (F33.0) Major Depressive Disorder, mild, recurrent   300.02 (F41.1) Generalized Anxiety Disorder   309.81 (F43.10) Posttraumatic Stress Disorder   303.90 (F10.20) Alcohol Use Disorder (Moderate)       Plan / Need for Future Services:  Return for individual therapy every week to address CSB. Continue weekly group therapy.    Shireen Pratt, PhD, Postdoctoral Fellow

## 2019-10-24 NOTE — ADDENDUM NOTE
Encounter addended by: Holger Braga LMFT, [facility] on: 10/24/2019 12:52 PM   Actions taken: Clinical Note Signed

## 2019-10-25 NOTE — PROGRESS NOTES
[facility]   Group Progress Note    Date of Service: 10/24/19  Client Name: Shawn Camejo  YOB: 1980  MRN:  [id number]  Treating Provider: Venita Lares, PhD and Shireen Pratt, PhD, Postdoctoral Fellow  Type of Session: Group  Number of Minutes: 120     Health Maintenance Summary - Mental Health Treatment Plan       Status Date      MENTAL HEALTH TX PLAN Next Due 8/11/2020      Done 9/11/2019 [facility] MENTAL HEALTH [place] PLAN SCAN     Done 9/28/2018         Current Symptoms/Status:  The client meets criteria for Other Specified Disruptive, Impulsive-Control, and Conduct Disorder (hypersexuality), which includes behavioral symptoms that cause clinically significant distress and impair social functioning. He has continued to struggle staying within boundaries but he also is unsure of what boundaries to set for himself.     The client meets criteria for Major Depressive Disorder, Recurrent Episode, Mild, which includes experiencing anhedonia, worthlessness, hypersomnia, fatigue, and a depressed mood more days than not for a period of at least two weeks. Symptoms persist.     Client endorses the following anxiety symptoms: difficulty controlling worry; restlessness or feeling keyed up or on edge; being easily fatigued; difficulty concentrating or mind going blank; irritability; muscle tension; and sleep disturbance (difficulty falling or staying asleep, or restless unsatisfying sleep).  Symptoms persist.     The client meets criteria for Posttraumatic Stress Disorder, which includes dissociative reactions, persistent avoidance of distressing memories, negative alternations in cognitions and mood associated with the event, and marked alterations in arousal and reactivity associated with the event. The symptoms have been present for at least one month and cause clinically significant distress or impairment.  Symptoms persist.    CSB: Stayed within boundaries!    Alcohol Use Disorder:  Didn't drink.  Went to two AA groups and his [facility] group.    Depression:  Still feeling depressed but feeling a bit more hopeful.    Progress Toward Treatment Goals:   Was not able to stay within boundaries.     Intervention: Modality and Response:  Utilized supportive and CBT techniques were used to address CSB and related mental health issues.    Client was not able to stay within boundaries, he went to a strip club and drink a beer. Few days later, he saw a escort. He is in shame/guilt and distress trying to understand his relapse.     He realized that he has no middle way to paid sex, which is definitely something he needs to eliminate from his life. The group helped him think about how to look at his money in a positive way so that he does not use the money to pay for sex.     He could not develop anything about his past family trauma and ex partner + relationships.    Few interactions with the group.     Assignment:  Stay within defined boundaries, keeping enduring himself with daily challenges and paying attention to his needs.      Working on Family enneagram.    Investing in relationships that bring him support.    Interactive Complexity:  N/A      Diagnosis:  312.89 (F91.8) Other Specified Disruptive, Impulsive-Control, and Conduct Disorder (hypersexuality)   296.31 (F33.0)  Major Depressive Disorder, mild, recurrent   300.02 (F41.1)  Generalized Anxiety Disorder   309.81 (F43.10) Posttraumatic Stress Disorder   303.90 (F10.20) Alcohol Use Disorder      Plan / Need for Future Services:  Return for individual therapy every week to address CSB. Continue weekly group therapy.    Shireen Pratt, PhD, Postdoctoral Fellow

## 2019-10-26 NOTE — PROGRESS NOTES
Shawn Camejo  [id number]               Adult CD Progress Note and Treatment Plan Review     Attendance  NOTE:   Date of Service 10/08/2019  Date of Creation 10/08/2019     Monday    Group Date: 10/07/2019   Group Attendance Unexcused absence   Group Therapy Type    Group Topic Covered    Client's Response To Group Topic    Client Group Participation Detail    Group Attendance (Time)    Individual Attendance    Family Attendance    Other Comments/Information       Tuesday     Group Date: 10/08/2019   Group Attendance Attended group session   Group Therapy Type Addiction and Life skill(s) Psychoeducation   Group Topic Covered REBT, MI, Assertive Communication, healthy boundaries, mindfulness   Client's Response To Group Topic Discussed personal experience with topic Expressed understanding of topic Offered helpful suggestions to peers   Client Group Participation Detail Highly involved   Group Attendance (Time) 3.0 Hours   Individual Attendance None   Family Attendance None   Other Comments/Information Group members continued work on Recovery attitudes, REBT, and Locus of Control.      Total # of Phase 1 Group Sessions: 15     Total # of Phase 2 Group Sessions:   Total # of Phase 3 Group Sessions:   Total # of 1:1 Sessions: 2    Support group attended this week: no    Reporting sobriety: Yes - Reports last date of use: 7/27/2019    Treatment Plan Review     Treatment Plan Review completed on:  10/07/2019     Projected discharge date: 11/21/2019    Client preferred learning style: Visual  Hands on  Verbal    Staff member(s) contributing: Lucille Castellanos MFA, MA, [facility]    Received supervision: No.    Client involvement with treatment planning: contributed to goals and plan.    Client received copy of plan/revised plan: Yes    Client agrees with plan/revised plan: Yes    Changes to Treatment Plan: No    Client's Dimension 1 Goal(s) Develop effective strategies to maintain sobriety   See information provided   Client's  Dimension 2 Goal(s) Obtain a physical evaluation. See information provided   Client's Dimension 3 Goal(s) Client will learn and utilize coping skills learned in the program to manage symptoms of depression and anxiety more effectively, reducing his PHQ9 score from 10 <6. See information provided   Client's Dimension 4 Goal(s) Increase awareness with how substance use is conflicted with personal values and address any ambivalence to change See information provided   Client's Dimension 5 Goal(s) Increase awareness of the disease concept of addiction and insight into personal relapse process, triggers and strategies to address See information provided   Client's Dimension 6 Goal(s) Expand sober support network and be involved in sober activities. See information provided     New Goals added since last review: None.    Goal(s) worked on since last review: Client reports attending sober support meetings, attempting to build sober support network (DIM 6)    Strategies effective: Yes    Treatment Coordination Activities: Yes - Case consultation with HARPREET Lopez LADC.    Medical, Mental Health and other appointments the client attended: Yes - Client reports continuing consultation with health care and mental health providers..    Medication issues: None. Medications: Naltrexone (DEPADE/REVIA) 50 MG tablet, sertraline (ZOLOFT) 100 MG tablets    Physical and mental health problems: MH Diagnosis, as listed.      DSM-V Diagnosis:   (Diagnosed by [facility] Psychologist Lux Bravo, PhD)  Other Specified Disruptive, Impulsive-Control, and Conduct Disorder (hypersexuality) (F91.8)  Major Depressive Disorder, mild, recurrent (F33.0)  Generalized Anxiety Disorder (F41.1)  Posttraumatic Stress Disorder (F43.10)    Substance Use Disorders:    303.90 (F10.20) Alcohol Use Disorder Moderate    ASAM Risk Rating: (Note the rationale for risk rating changes)    Dimension 1 0 Last date of use: 7/27/2019. Client denies current  symptoms of withdrawal, denies symptoms of PAWS. Client reports taking Naltrexone as prescribed to help with cravings to use. Client provided information on cycles of PAWS symptoms and increased risks of relapse, noting more than 2 months sobriety re: alcohol use and increased risk.    Dimension 2 0 Client denies current medication issues.    Dimension 3 2 Client denies SI, Intent, or thoughts of self-harm to self or others. Client continues to report difficulty concentrating, and sense of detachment. Client reports frequently feeling overwhelmed, including at work, and in completing daily tasks.    Dimension 4 1 Main motivation for sobriety is self-care; notes blocks to motivation include anxiety and triggers. Client presents with significant struggle for motivation to attend sober support meetings and to gain a sponsor; client reports struggle with isolation.     Dimension 5 3 Client has co-occurring MH concerns that increase risk of relapse;  has history and continuing tendency to isolate; lacks sober support network.       Dimension 6 2 Client reports that he is employed, and notes that he likes job because minimal stress, but when feeling overwhelmed, he reports job is stressful, and feels triggered to use. Client's isolation is highest risk for return to use.    Review and evaluation of the individual abuse prevention plan: The program's individual abuse prevention plan (IAPP) is sufficient for this client.     Data: (Need to include short narrative for the week on what was worked on)  offered feedback good insight client did actively participate  Client attended one session, reporting that he is feeling overwhelmed by not having car due to mechanical problems.     Intervention:   Counselor feedback  Education  Group feedback  Motivational Enhancement Therapy      Assessment:    Stages of Change Model  Contemplation    Appears/Sounds:  Ambivalent  Depressed  Anxious    Plan:   -Attend 2-3 sober support group  meetings each week (this includes non-12-step/AA alternative meetings).  -Client will meet with his therapist once per week.  -Client will continue to work on treatment plan objectives.  -Client will attend all weekly Phase 1 group sessions.   -Client to engage in sober activities each week.   Client will be informed about need for consistent attendance at group sessions, and encouraged to schedule alternative transportation plans if issues with car continue.   Client will be encouraged to identify and use strategies that to help manage feelings of overwhelm and increase distress tolerance.       Lucille Castellanos MFA, MA, [facility]  October 8, 2019

## 2019-10-28 ENCOUNTER — HOSPITAL ENCOUNTER (OUTPATIENT)
Dept: BEHAVIORAL HEALTH | Facility: CLINIC | Age: 39
End: 2019-10-28
Attending: SOCIAL WORKER
Payer: COMMERCIAL

## 2019-10-28 PROCEDURE — H2035 A/D TX PROGRAM, PER HOUR: HCPCS | Mod: HQ

## 2019-10-28 NOTE — PROGRESS NOTES
"Shawn Camejo  [id number]               Adult CD Progress Note and Treatment Plan Review     Attendance  NOTE:   Date of Service 10/28/2019  Date of Creation 10/28/2019     Monday    Group Date: 10/28/2019   Group Attendance Attended group session   Group Therapy Type Addiction; Life Skills   Group Topic Covered Disease of Addiction; Relapse Prevention;    Client's Response To Group Topic Discussed personal experience with topic. Expressed understanding of topic.    Client Group Participation Detail Involved in group process, some distracted behavior   Group Attendance (Time) 3.0 Hours   Individual Attendance None   Family Attendance None   Other Comments/Information Group members checked-in, providing personal ratings on motivation for recovery, reflections on \"recovery attitude\" actions, and current symptoms of acute or post-acute withdrawal. Group psychoeducation on stages of withdrawal, Post-Acute Withdrawal Syndrome correlation with relapse, warning signs, and strategies to minimize risks. This client       Wednesday    Group Date: 10/30/2019   Group Attendance Attended group session   Group Therapy Type Addiction and Life skill(s) Psychoeducation   Group Topic Covered Boundaries, Disease of Addiction/Choices in Recovery, Meditation/Breathing Exercises, Thinking Errors/Negative Self-talk   Client's Response To Group Topic Discussed personal experience with topic Expressed understanding of topic   Client Group Participation Detail Highly involved   Group Attendance (Time) 3.0 Hours   Individual Attendance None   Family Attendance None   Group Session Summary: This group session focused on client check-in on current motivation and actions for sobriety/recovery and reflections on action steps for recovery attitude. Next, group reviewed information on relapse prevention, then met in dyads to discuss and identify individual warning signs, and strategies to increase self-awareness of warning signs. Clients then " returned to full group and shared one key warning sign and initial steps for awareness and actions to divert process. This client reported significant struggle with rumination, dread, and persistent focus on reasons to drink.     Total # of Phase 1 Group Sessions: 16     Total # of Phase 2 Group Sessions:   Total # of Phase 3 Group Sessions:   Total # of 1:1 Sessions: 2    Support group attended this week: no    Reporting sobriety: Yes; Reports use episode - Current last date of use: 10/14/2019  Treatment Plan Review     Treatment Plan Review completed on:  10/28/2019     Projected discharge date: 11/21/2019    Client preferred learning style: Visual  Hands on  Verbal    Staff member(s) contributing: Lucille Castellanos MFA, MA, [facility]    Received supervision: No.    Client involvement with treatment planning: contributed to goals and plan.    Client received copy of plan/revised plan: Yes    Client agrees with plan/revised plan: Yes    Changes to Treatment Plan: No    Client's Dimension 1 Goal(s) Develop effective strategies to maintain sobriety   See information provided   Client's Dimension 2 Goal(s) Obtain a physical evaluation. See information provided   Client's Dimension 3 Goal(s) Client will learn and utilize coping skills learned in the program to manage symptoms of depression and anxiety more effectively, reducing his PHQ9 score from 10 <6. See information provided   Client's Dimension 4 Goal(s) Increase awareness with how substance use is conflicted with personal values and address any ambivalence to change See information provided   Client's Dimension 5 Goal(s) Increase awareness of the disease concept of addiction and insight into personal relapse process, triggers and strategies to address See information provided   Client's Dimension 6 Goal(s) Expand sober support network and be involved in sober activities. See information provided     New Goals added since last review: None.    Goal(s) worked on since last  review: Client reports attending sober support meetings, attempting to build sober support network (DIM 6)    Strategies effective: Yes    Treatment Coordination Activities: Yes - Case consultation with HARPREET Lopez LADC.    Medical, Mental Health and other appointments the client attended: Yes - Client reports continuing consultation with health care and mental health providers..    Medication issues: None. Medications: Naltrexone (DEPADE/REVIA) 50 MG tablet, sertraline (ZOLOFT) 100 MG tablets    Physical and mental health problems: MH Diagnosis, as listed.      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 Client denies current symptoms of acute-withdrawal.. Client denies current symptoms of withdrawal, denies symptoms of PAWS. Client reports taking Naltrexone as prescribed to help with cravings to use.     Dimension 2 0 Client denies current medication issues. Client rates current overall health at 7/10. He reports that he is resting for self-care. He reports need to sleep more, rating sleeplessness at 8/10.    Dimension 3 2 Client denies SI, Intent, or thoughts of self-harm to self or others. Client continues to report difficulty concentrating, and sense of detachment. Client reports feeling sad, hopeless.     Dimension 4 2 Due to recent use episode, increased Risk Factor from 1 to 2. Client rates current motivation for recovery at 7/10. He reports most that feelings of depression are most significant obstacle to his recovery. He notes that primary  motivation is desire to stay safe and to find a better life.     Dimension 5 3 Client has co-occurring MH concerns that increase risk of relapse;  has history and continuing tendency to isolate; lacks sober support network.  Client reports biggest trigger to use was feelings of loneliness after work; he did not attend AA meetings, did not contact sponsor but did make phone calls to others in sober community.   Dimension 6 2 Client reports that he is employed; currently reports work stress is significant issue.     Review and evaluation of the individual abuse prevention plan: The program's individual abuse prevention plan (IAPP) is sufficient for this client.     Data: (Need to include short narrative for the week on what was worked on)  offered feedback good insight client did actively participate  Client reported recent use episode, but has not followed through with requested meeting with counselor to discuss.    Intervention:   Counselor feedback  Education  Group feedback  Motivational Enhancement Therapy    Assessment:    Stages of Change Model  Contemplation    Appears/Sounds:  Ambivalent  Depressed  Anxious    Plan:   -Attend 2-3 sober support group meetings each week (this includes non-12-step/AA alternative meetings).  -Client will meet with his therapist once per week.  -Client will continue to work on treatment plan objectives.  -Client will attend all weekly Phase 1 group sessions.   -Client to engage in sober activities each week.   -Client will meet with counselor to review need for Treatment Contract.      Lucille Castellanos MFA, MA, [facility]  October 30, 2019

## 2019-10-30 ENCOUNTER — HOSPITAL ENCOUNTER (OUTPATIENT)
Dept: BEHAVIORAL HEALTH | Facility: CLINIC | Age: 39
End: 2019-10-30
Attending: SOCIAL WORKER
Payer: COMMERCIAL

## 2019-10-30 PROCEDURE — H2035 A/D TX PROGRAM, PER HOUR: HCPCS | Mod: HQ

## 2019-10-31 ENCOUNTER — OFFICE VISIT (OUTPATIENT)
Dept: OTHER | Facility: OUTPATIENT CENTER | Age: 39
End: 2019-10-31
Payer: COMMERCIAL

## 2019-10-31 DIAGNOSIS — F10.21 ALCOHOL USE DISORDER, MODERATE, IN EARLY REMISSION, DEPENDENCE (H): ICD-10-CM

## 2019-10-31 DIAGNOSIS — F91.8 OTHER CONDUCT DISORDERS: Primary | ICD-10-CM

## 2019-10-31 DIAGNOSIS — F33.0 MAJOR DEPRESSIVE DISORDER, RECURRENT EPISODE, MILD (H): ICD-10-CM

## 2019-10-31 DIAGNOSIS — F43.10 POSTTRAUMATIC STRESS DISORDER: ICD-10-CM

## 2019-10-31 DIAGNOSIS — F41.1 GENERALIZED ANXIETY DISORDER: ICD-10-CM

## 2019-10-31 NOTE — ADDENDUM NOTE
Encounter addended by: Juany Nicole LADC on: 10/30/2019 7:16 PM   Actions taken: Clinical Note Signed

## 2019-10-31 NOTE — PROGRESS NOTES
Case consultation:  D)  Therapist reviewed ct's case.  DOC alcohol.  MDD, recurrent, mild; PTSD, FERNANDEZ.  Ct is [facility] with the [facility] regarding sexual addiction.  Stable.  P)  Continue tx plan.  Consult with therapist at center as needed.    Juany Nicole, JOSESITO, LICSW, [facility]  Clinical

## 2019-11-01 NOTE — PROGRESS NOTES
[facility] -  Case Progress Note    Date of Service: 10/31/19   Client Name: Shawn Camejo  YOB: 1980  MRN:  [id number]  Treating Provider: Shireen Pratt, PhD, Postdoctoral Fellow  Type of Session: Individual  Present in Session: Patient only  Number of Minutes: 53    Health Maintenance Summary - Mental Health Treatment Plan       Status Date      MENTAL HEALTH TX PLAN Next Due 8/11/2020      Done 9/11/2019 HIM MENTAL HEALTH TX PLAN SCAN     Done 9/28/2018         Current Symptoms/Status:    Compulsive Sexual Behavior: Client was unable to control his urges to viewing/browsing escorts ads. He reported the presence of sexual fantasies every day. Still having a hard time to not watching pornographic content. Risk activity of going out to dinner with a known escort.    Depression: Hopelessness and a permanent depressive state were the main symptoms. The difficulties of sleeping still continue.    Anxiety: Client endorses the following anxiety symptoms: Nervous; Worry: difficulties controlling; Irritability; Overwhelming sensation; and Intrusive thoughts.    Alcohol Use Disorder: Client was able to stay within his boundaries. He has missing the social life related to alcohol use. Some urges to drink were observed. He did not attend one meeting of AA treatment group.     Posttraumatic Stress Disorder: Not being diagnosed today.      Progress Toward Treatment Goals:   Session # 4 - Client continues to show interest and commitment towards receiving services at [facility].  Client joined CSB group 4/20/19. Remains motivated to continue with group sessions.  He is in an AA treatment group at [facility] (attended 2/3 meetings).  No alcohol consumption for 2 weeks.  Client was able to keep one of his sexually boundaries.  Client completed one of his assignments regarding using the depression symptom charts to review the notes made on his journal        Intervention: Modality and Description:  Individual, interpersonal, CBT. The focus of today's session was supporting his work on his boundaries and how he has self-care at same time deal with depression and anxiety.    Response to Intervention:  Client reported feels confused about on how to keep his sexually boundaries. He stated that he needs help to setting realistic boundaries. Dealing with the urge to see pornographic content associated with his feeling of loneliness, has not been an easy task for him. However, compared to the other weeks, he reduced the frequency and masturbation and the time used on watching pornographic content. He has been trying to develop a social life, but he is having trouble to establishing true friendships in accordance with his boundaries and sense of life. He reported that felt less depressed and he had some positive coping this week. He said the charts used about his depressive symptoms helped him be more aware and accurate in assessing his symptoms. He would like the same exercise to be done with his coping style.    Assignment:  - Continue attending CSB and AA group.    Interactive Complexity:  N/A      Diagnosis:  312.89 (F91.8) Other Specified Disruptive, Impulsive-Control, and Conduct Disorder (hypersexuality)   296.31 (F33.0) Major Depressive Disorder, mild, recurrent   300.02 (F41.1) Generalized Anxiety Disorder   309.81 (F43.10) Posttraumatic Stress Disorder   303.90 (F10.20) Alcohol Use Disorder (Moderate)       Plan / Need for Future Services:  Return for individual therapy every week to address CSB. Continue weekly group therapy.     Shireen Pratt, PhD, Postdoctoral Fellow

## 2019-11-05 NOTE — PROGRESS NOTES
Client was absent from group 11/4/2019 and could not be assessed. Client did not contact counselor/s to report absence.     This is an unexcused absence.    Lucille Castellanos MFA, MA, [facility]

## 2019-11-05 NOTE — ADDENDUM NOTE
Encounter addended by: Lucille Castellanos [facility] on: 11/4/2019 6:25 PM   Actions taken: Delete clinical note, Clinical Note Signed

## 2019-11-05 NOTE — PROGRESS NOTES
NOTE DATE OF SERVICE FOR NON-ATTENDANCE: 11/04/2019    Client was absent from group 11/4/2019 and could not be assessed. Client did not contact counselor/s to report absence.     This is an unexcused absence.    Lucille Castellanos MFA, MA, [facility]

## 2019-11-05 NOTE — PROGRESS NOTES
[facility]  [address] [place] [address]        11/5/2019    Shawn Camejo  [address]  [place] [address]      Dear Shawn,    We are concerned because you have not been attending group, and you have not contacted program counselors. Consistent attendance is important, and expected for successful engagement in treatment and recovery.     If you do not contact me by 1:00 PM on Monday, November 11, 2019 you will be discharged.  This is the only notice you will receive.    The following recommendations are being made for your continuing care:      Continue clinical outpatient treatment program for substance use disorder    Follow all recommendations from your mental health and medical care providers     Additional recommendations:      Remain abstinent from all non-prescribed, mood-altering substances    Attend sober support group weekly    Continue to work your recovery program       If we can be of further service, please don't hesitate to call.    Sincerely,    Lucille Castellanos MFA, MA, [facility]   Counselor II  Co-Occurring Outpatient Program  Phone: [phone]  Email: [email or web address]

## 2019-11-07 ENCOUNTER — OFFICE VISIT (OUTPATIENT)
Dept: OTHER | Facility: OUTPATIENT CENTER | Age: 39
End: 2019-11-07
Payer: COMMERCIAL

## 2019-11-07 DIAGNOSIS — F41.1 GENERALIZED ANXIETY DISORDER: ICD-10-CM

## 2019-11-07 DIAGNOSIS — F10.21 ALCOHOL USE DISORDER, MODERATE, IN EARLY REMISSION, DEPENDENCE (H): ICD-10-CM

## 2019-11-07 DIAGNOSIS — F43.10 POSTTRAUMATIC STRESS DISORDER: ICD-10-CM

## 2019-11-07 DIAGNOSIS — F33.0 MAJOR DEPRESSIVE DISORDER, RECURRENT EPISODE, MILD (H): ICD-10-CM

## 2019-11-07 DIAGNOSIS — F91.8 OTHER CONDUCT DISORDERS: Primary | ICD-10-CM

## 2019-11-07 NOTE — PROGRESS NOTES
I was present for the entire group therapy session and actively participated in the session.  I agree with the assessment and plan as documented in this note.    Venita Lares, PhD, LP   impairments found

## 2019-11-07 NOTE — PROGRESS NOTES
[facility]   Group Progress Note    Date of Service: 10/31/19  Client Name: Shawn Camejo  YOB: 1980  MRN:  [id number]  Treating Provider: Venita Lares, PhD and Shireen Pratt, PhD, Postdoctoral Fellow  Type of Session: Group  Number of Minutes: 120     Health Maintenance Summary - Mental Health Treatment Plan       Status Date      MENTAL HEALTH TX PLAN Next Due 8/11/2020      Done 9/11/2019 HIM MENTAL HEALTH TX PLAN SCAN     Done 9/28/2018         Current Symptoms/Status:  The client meets criteria for Other Specified Disruptive, Impulsive-Control, and Conduct Disorder (hypersexuality), which includes behavioral symptoms that cause clinically significant distress and impair social functioning. He has continued to struggle staying within boundaries but he also is unsure of what boundaries to set for himself.     The client meets criteria for Major Depressive Disorder, Recurrent Episode, Mild, which includes experiencing anhedonia, worthlessness, hypersomnia, fatigue, and a depressed mood more days than not for a period of at least two weeks. Symptoms persist.     Client endorses the following anxiety symptoms: difficulty controlling worry; restlessness or feeling keyed up or on edge; being easily fatigued; difficulty concentrating or mind going blank; irritability; muscle tension; and sleep disturbance (difficulty falling or staying asleep, or restless unsatisfying sleep).  Symptoms persist.     The client meets criteria for Posttraumatic Stress Disorder, which includes dissociative reactions, persistent avoidance of distressing memories, negative alternations in cognitions and mood associated with the event, and marked alterations in arousal and reactivity associated with the event. The symptoms have been present for at least one month and cause clinically significant distress or impairment.  Symptoms persist.    CSB: Stayed within boundaries!    Alcohol Use Disorder:  Didn't drink.  Went to two AA groups and his [facility] group.    Depression:  Still feeling depressed but feeling a bit more hopeful.    Progress Toward Treatment Goals:   Was not able to stay within boundaries.     Intervention: Modality and Response:  Utilized supportive and CBT techniques were used to address CSB and related mental health issues.    Client was able to stay within boundaries, still in hangover relapse. It was reinforced relapse prevention.    He tried to contact someone to hang out but he chosed one of the prostitutes with whom he had the most intimacy (they went out together in the past). Legitimate contacts and safe relationships was discussed.    Few interactions with the group. He only spoke at the end of the group and he took a very short time to share his stuffs.     Assignment:  Stay within defined boundaries, keeping enduring himself with daily challenges and paying attention to his needs.      Investing in relationships (legitimate) that bring him support.    Working on Family enneagram.    Interactive Complexity:  N/A      Diagnosis:  312.89 (F91.8) Other Specified Disruptive, Impulsive-Control, and Conduct Disorder (hypersexuality)   296.31 (F33.0)  Major Depressive Disorder, mild, recurrent   300.02 (F41.1)  Generalized Anxiety Disorder   309.81 (F43.10) Posttraumatic Stress Disorder   303.90 (F10.20) Alcohol Use Disorder      Plan / Need for Future Services:  Return for individual therapy every week to address CSB. Continue weekly group therapy.    Shireen Pratt, PhD, Postdoctoral Fellow

## 2019-11-07 NOTE — PROGRESS NOTES
I did not personally see the patient.  I reviewed and agree with the assessment and plan as documented in this note.     Nohelia Boothe PsyD, LP

## 2019-11-08 ENCOUNTER — HOSPITAL ENCOUNTER (OUTPATIENT)
Dept: BEHAVIORAL HEALTH | Facility: CLINIC | Age: 39
End: 2019-11-08
Attending: SOCIAL WORKER
Payer: COMMERCIAL

## 2019-11-08 PROCEDURE — H2035 A/D TX PROGRAM, PER HOUR: HCPCS

## 2019-11-08 NOTE — PROGRESS NOTES
INDIVIDUAL SESSION SUMMARY     D) Met with client on 11/08/2019 from 11:15 AM - 12:15 PM. Client did not attend group sessions during this past week; called client on 11/06/2019 and client returned call on 11/07/2019. Client reported that he was feeling overwhelmed and had been in bed since Monday, 11/04/2019, following use episode including compulsivity behaviors. Client reported that he engaged in series of compulsive behaviors, including use of alcohol, and then felt immobilized by feelings of depression. In telephone conversation, client reported that he would attend scheduled group session with [facility] and attend meeting with this writer on 11/08/2019. Client reports that he did also follow through and attend group session last night.     Client shared that he struggles to manage work, attendance at group session/s in other program, individual therapy with that program, and 3-days per week sessions with co-occurring group. Client affirmed that he wants to continue with current IOP Co-occurring group, although difficult to manage time and schedule.  In last group session client attended, he shared that he had received a statement from the IRS regarding outstanding debt, and he had felt triggered, including feelings of overwhelm and shame, due to previous issues with money and debt. Client noted that uses avoidance to cope, then overwhelm further increases due to uncompleted tasks, including paying bills, doing laundry, and maintaining daily schedule focused on recovery.      I) Individual session with client. Employed client-centered, CBT, and solution-focused interventions. Discussed the role his use of alcohol plays in series of compulsivity behaviors and general timeline for first onset of each. Discussed ways current co-occurring group provides clinical care for recovery that may integrate with focus of care in other group and individual therapy focused on family of origin work, and  reviewed several options for further review. Discussed focus on differentiating some pieces to help increase successful self-management of recovery. Employed MI to assist client in determining several effective treatment goals; client identified key items for focus, including: differentiation of use of alcohol and role it plays in mental health issues; money management including strategies to minimize triggers/risks; focus on phases of relapse in short cycles; distress tolerance; and additional focus on boundaries. Reviewed importance of recovery behaviors, including daily schedule and rituals that include recovery reading/meditation and 10th Step practice, sober support meetings and network, and consistent attendance for scheduled treatment programming. Encouraged client to recognize his current recovery successes, including his return to treatment programming and meeting with counselor.      A) Client presents with continuing obstacles to maintaining sobriety, including self-sabotaging and isolating behaviors. He demonstrates keen insight into thought processes and beliefs that generate sequence of negative feelings and behaviors, including his own experiences. Client also demonstrates insight when identifying particular strategies that would help him employ alternative actions to stop repeated cycles of behavior. Client notes that feeling of loneliness is a major factor, and results in contacting friend who accompanies him. Client stated that he first admitted for residential treatment in 2010, and he presents with extensive knowledge of addiction and compulsivity patterns. Client notes that he has had repeated cycles of use for the past 4-5 months, and feels frustrated that he can't seem to build more extended stability. Client presents with some continuing struggle with internal motivation, including discomfort with transitioning out of self-sabotaging and isolating behaviors.      P) Client will attend group  session on Monday, 11/11/2019; sign Treatment Contract on maintaining consistent attendance and engagement; and attend bi-weekly meeting with counselor to review Treatment Plan assignments.       Lucille Castellanos MFA, MA, [facility]  November 8, 2019

## 2019-11-08 NOTE — PROGRESS NOTES
.[facility]   Group Progress Note    Date of Service: 11/07/19  Client Name: Shawn Camejo  YOB: 1980  MRN:  [id number]  Treating Provider: Venita Lares, PhD and Shireen Pratt, PhD, Postdoctoral Fellow  Type of Session: Group  Number of Minutes: 120     Health Maintenance Summary - Mental Health Treatment Plan       Status Date      MENTAL HEALTH TX PLAN Next Due 8/11/2020      Done 9/11/2019 [facility] MENTAL HEALTH TX PLAN SCAN     Done 9/28/2018         Current Symptoms/Status:  The client meets criteria for Other Specified Disruptive, Impulsive-Control, and Conduct Disorder (hypersexuality), which includes behavioral symptoms that cause clinically significant distress and impair social functioning. He has continued to struggle staying within boundaries but he also is unsure of what boundaries to set for himself.     The client meets criteria for Major Depressive Disorder, Recurrent Episode, Mild, which includes experiencing anhedonia, worthlessness, hypersomnia, fatigue, and a depressed mood more days than not for a period of at least two weeks. Symptoms persist.     Client endorses the following anxiety symptoms: difficulty controlling worry; restlessness or feeling keyed up or on edge; being easily fatigued; difficulty concentrating or mind going blank; irritability; muscle tension; and sleep disturbance (difficulty falling or staying asleep, or restless unsatisfying sleep).  Symptoms persist.     The client meets criteria for Posttraumatic Stress Disorder, which includes dissociative reactions, persistent avoidance of distressing memories, negative alternations in cognitions and mood associated with the event, and marked alterations in arousal and reactivity associated with the event. The symptoms have been present for at least one month and cause clinically significant distress or impairment.  Symptoms persist.    CSB: Stayed within boundaries!    Alcohol Use Disorder:  Didn't  drink. Went to two AA groups and his [facility] group.    Depression:  Still feeling depressed but feeling a bit more hopeful.    Progress Toward Treatment Goals:   Was not able to stay within boundaries.     Intervention: Modality and Response:  Utilized supportive and CBT techniques were used to address CSB and related mental health issues.    He did not attend any meetings supporting meeting for substance abuse. However, he was able to contacted his sponsor.    Client was not able to stay within boundaries, it was two (2) relapses times in less 15 days. It was reinforced relapse prevention,  he is not paying attention on:  increase positive coping, decrease negative coping, risk activities and anxiety symptoms on his condition.    Almost zero interactions with the group. Was pulled by the therapist to speak first and he took again his own time to gave feedback to someone.     Assignment:  Stay within defined boundaries, keeping enduring himself with daily challenges and paying attention to his needs.    Investing in relationships (legitimate) that bring him support.  Working on Family enneagram.  Working on core fuels (current and history)    Interactive Complexity:  N/A      Diagnosis:  312.89 (F91.8) Other Specified Disruptive, Impulsive-Control, and Conduct Disorder (hypersexuality)   296.31 (F33.0)  Major Depressive Disorder, mild, recurrent   300.02 (F41.1)  Generalized Anxiety Disorder   309.81 (F43.10) Posttraumatic Stress Disorder   303.90 (F10.20) Alcohol Use Disorder      Plan / Need for Future Services:  Return for individual therapy every week to address CSB. Continue weekly group therapy.    Shireen Pratt, PhD, Postdoctoral Fellow

## 2019-11-11 ENCOUNTER — OFFICE VISIT (OUTPATIENT)
Dept: OTHER | Facility: OUTPATIENT CENTER | Age: 39
End: 2019-11-11
Payer: COMMERCIAL

## 2019-11-11 DIAGNOSIS — F41.1 GENERALIZED ANXIETY DISORDER: ICD-10-CM

## 2019-11-11 DIAGNOSIS — F91.8 OTHER CONDUCT DISORDERS: Primary | ICD-10-CM

## 2019-11-11 DIAGNOSIS — F10.21 ALCOHOL USE DISORDER, MODERATE, IN EARLY REMISSION, DEPENDENCE (H): ICD-10-CM

## 2019-11-11 DIAGNOSIS — F33.0 MAJOR DEPRESSIVE DISORDER, RECURRENT EPISODE, MILD (H): ICD-10-CM

## 2019-11-11 NOTE — PROGRESS NOTES
[facility] -  [facility] Progress Note    Date of Service: 11/11/19  Client Name: Shawn Camejo  YOB: 1980  MRN:  [id number]  Treating Provider: Shireen Pratt, PhD, Postdoctoral Fellow  Type of Session: Individual  Present in Session: Patient only  Number of Minutes: 53    Health Maintenance Summary - Mental Health Treatment Plan       Status Date      MENTAL HEALTH TX PLAN Next Due 8/11/2020      Done 9/11/2019 [facility] MENTAL HEALTH TX PLAN SCAN     Done 9/28/2018         Current Symptoms/Status:    Compulsive Sexual Behavior: Client was unable to control his urges to viewing/browsing escorts ads. He saw a escort and he has been texting others. He reported the presence of sexual fantasies every day. Sexual risk activity: he had sex without condom with a known escort.    Depression: Permanent depressive state were the main symptoms. The sleeping difficulties continues. He endorses the following depression symptoms: Feel flat; Empty; ? Energy, Worthlessness.     Anxiety: Client reported being more anxious compared to previous week. He endorses the following anxiety symptoms: Nervous; Worry: difficulties controlling; Irritability; Overwhelming Sensation; Fear and Dread; and Intrusive thoughts.     Alcohol Use Disorder: Client was able to stay within his boundaries. He is craving for alcohol when hang out with someone.       Posttraumatic Stress Disorder: Not being diagnosed today.     Progress Toward Treatment Goals:   Session # 5 - Client continues to show interest and commitment towards receiving services at [facility].  Client joined CSB group 4/20/19. Remains motivated to continue with group sessions.  He is in an AA treatment group at [facility] (committed to attend 3/3 meetings this week).  The possibility of increasing the dosage of naltrexone was discussed. He will talk about it with his health provider Bhargav Nolasco PA-C.    Intervention: Modality and  Description:  Individual, interpersonal, CBT. The focus of today's session was to strengthen relapse prevention.    Response to Intervention:  Client reported being procrastinating to stay within his boundaries. He reduced his masturbations but had two sexual encounters with escorts. He acknowledged that his anxiety is unbearable, as well as his urges and cravings regarding sex. He said he will make a great effort to take better care of his routine and coping strategies to not lost control over his sexual behavior. He acknowledged that he has been reckless and avoidant person with his obligations.The discussion of coping strategies using the chart brought him the awareness on his lack of action to make progress.    Assignment:  - Continue attending CSB and AA group (permanent assignment).  - Use the coping style charts (positive/negative) to review the notes made on his journal (new assignment).    Interactive Complexity:  N/A      Diagnosis:  312.89 (F91.8) Other Specified Disruptive, Impulsive-Control, and Conduct Disorder (hypersexuality)   296.31 (F33.0) Major Depressive Disorder, mild, recurrent   300.02 (F41.1) Generalized Anxiety Disorder   309.81 (F43.10) Posttraumatic Stress Disorder   303.90 (F10.20) Alcohol Use Disorder (Moderate)       Plan / Need for Future Services:  Return for individual therapy every week to address CSB. Continue weekly group therapy.     Shireen Pratt, PhD, Postdoctoral Fellow

## 2019-11-12 NOTE — PROGRESS NOTES
[facility] -  Case Progress Note    Date of Service: 10/31/19  Client Name: Shawn Camejo  YOB: 1980  MRN:  [id number]  Treating Provider: Shireen Pratt, PhD, Postdoctoral Fellow  Type of Session: Individual  Present in Session: Patient only  Number of Minutes: 53    Health Maintenance Summary - Mental Health Treatment Plan       Status Date      MENTAL HEALTH TX PLAN Next Due 8/11/2020      Done 9/11/2019 HIM MENTAL HEALTH TX PLAN SCAN     Done 9/28/2018         Current Symptoms/Status:  The client meets criteria for Other Specified Disruptive, Impulsive-Control, and Conduct Disorder (hypersexuality), which includes behavioral symptoms that cause clinically significant distress and impair social functioning. There appears to be evidence that client's behaviors are sexually compulsive in nature.     The client meets criteria for Major Depressive Disorder, Recurrent Episode, Mild, which includes experiencing anhedonia, worthlessness, hypersomnia, fatigue, and a depressed mood more days than not for a period of at least two weeks.    Client endorses the following anxiety symptoms: difficulty controlling worry; restlessness or feeling keyed up or on edge; being easily fatigued; difficulty concentrating or mind going blank; irritability; muscle tension; and sleep disturbance (difficulty falling or staying asleep, or restless unsatisfying sleep).    The client meets criteria for Posttraumatic Stress Disorder, which includes dissociative reactions, persistent avoidance of distressing memories, negative alternations in cognitions and mood associated with the event, and marked alterations in arousal and reactivity associated with the event. The symptoms have been present for at least one month and cause clinically significant distress or impairment.    The client meets criteria for Alcohol Use Disorder (Moderate), which includes consuming alcohol in larger amounts than intended, giving up  social/recreational activities due to use, continued use despite negative consequences (eg, CSB), and unsuccessful efforts to cut down alcohol use.      Progress Toward Treatment Goals:   Session # 4 - Client continues to show interest and commitment towards receiving services at [facility].  Client joined CSB group 4/20/19. Remains motivated to continue with group sessions.  He is in an AA treatment group at [facility].          Intervention: Modality and Description:  Individual, interpersonal, CBT. The focus of today's session was on helping him increase insight and understanding about what he has worked with other therapists and it is missing. It was assessed the severity and frequency of depression symptoms and an overview about his progress.    Response to Intervention:  He mentioned being motivated and engaged to work with a new psychologist. Reviewing all progress helps him to feel in the right track. Although symptoms of depression are still present. He reported that graphically assessing the symptoms of depression may help him better monitor them.  Engaging in self-care needs to be a daily activity for him and he has not doing a good job at it.    Assignment:  - Continue attending CSB and AA group.  - Use the depression symptom charts to review the notes made on his journal.    Interactive Complexity:  N/A      Diagnosis:  312.89 (F91.8) Other Specified Disruptive, Impulsive-Control, and Conduct Disorder (hypersexuality)   296.31 (F33.0) Major Depressive Disorder, mild, recurrent   300.02 (F41.1) Generalized Anxiety Disorder   309.81 (F43.10) Posttraumatic Stress Disorder   303.90 (F10.20) Alcohol Use Disorder (Moderate)       Plan / Need for Future Services:  Return for individual therapy every week to address CSB. Continue weekly group therapy.     Shireen Pratt, PhD, Postdoctoral Fellow

## 2019-11-12 NOTE — PROGRESS NOTES
[facility] -  Case Progress Note    Date of Service: 11/11/19  Client Name: Shawn Camejo  YOB: 1980  MRN:  [id number]  Treating Provider: Shireen Pratt, PhD, Postdoctoral Fellow  Type of Session: Individual  Present in Session: Patient only  Number of Minutes: 53    Health Maintenance Summary - Mental Health Treatment Plan       Status Date      MENTAL HEALTH TX PLAN Next Due 8/11/2020      Done 9/11/2019 HIM MENTAL HEALTH TX PLAN SCAN     Done 9/28/2018         Current Symptoms/Status:  The client meets criteria for Other Specified Disruptive, Impulsive-Control, and Conduct Disorder (hypersexuality), which includes behavioral symptoms that cause clinically significant distress and impair social functioning. There appears to be evidence that client's behaviors are sexually compulsive in nature.     The client meets criteria for Major Depressive Disorder, Recurrent Episode, Mild, which includes experiencing anhedonia, worthlessness, hypersomnia, fatigue, and a depressed mood more days than not for a period of at least two weeks.    Client endorses the following anxiety symptoms: difficulty controlling worry; restlessness or feeling keyed up or on edge; being easily fatigued; difficulty concentrating or mind going blank; irritability; muscle tension; and sleep disturbance (difficulty falling or staying asleep, or restless unsatisfying sleep).    The client meets criteria for Posttraumatic Stress Disorder, which includes dissociative reactions, persistent avoidance of distressing memories, negative alternations in cognitions and mood associated with the event, and marked alterations in arousal and reactivity associated with the event. The symptoms have been present for at least one month and cause clinically significant distress or impairment.    The client meets criteria for Alcohol Use Disorder (Moderate), which includes consuming alcohol in larger amounts than intended, giving up  social/recreational activities due to use, continued use despite negative consequences (eg, CSB), and unsuccessful efforts to cut down alcohol use.      Progress Toward Treatment Goals:   Session # 5 - Client continues to show interest and commitment towards receiving services at [facility].  Client joined CSB group 4/20/19. Remains motivated to continue with group sessions.  He is in an AA treatment group at [facility].          Intervention: Modality and Description:  Individual, interpersonal, CBT. The focus of today's session was on helping him increase insight and understanding about what he has worked with other therapists and it is missing. It was assessed the severity and frequency of depression symptoms and an overview about his progress.    Response to Intervention:  He mentioned being motivated and engaged to work with a new psychologist. Reviewing all progress helps him to feel in the right track. Although symptoms of depression are still present. He reported that graphically assessing the symptoms of depression may help him better monitor them.  Engaging in self-care needs to be a daily activity for him and he has not doing a good job at it.    Assignment:  - Continue attending CSB and AA group.  - Use the depression symptom charts to review the notes made on his journal.    Interactive Complexity:  N/A      Diagnosis:  312.89 (F91.8) Other Specified Disruptive, Impulsive-Control, and Conduct Disorder (hypersexuality)   296.31 (F33.0) Major Depressive Disorder, mild, recurrent   300.02 (F41.1) Generalized Anxiety Disorder   309.81 (F43.10) Posttraumatic Stress Disorder   303.90 (F10.20) Alcohol Use Disorder (Moderate)       Plan / Need for Future Services:  Return for individual therapy every week to address CSB. Continue weekly group therapy.     Shireen Pratt, PhD, Postdoctoral Fellow

## 2019-11-13 ENCOUNTER — HOSPITAL ENCOUNTER (OUTPATIENT)
Dept: BEHAVIORAL HEALTH | Facility: CLINIC | Age: 39
End: 2019-11-13
Attending: SOCIAL WORKER
Payer: COMMERCIAL

## 2019-11-13 PROCEDURE — H2035 A/D TX PROGRAM, PER HOUR: HCPCS | Mod: HQ

## 2019-11-14 ENCOUNTER — HOSPITAL ENCOUNTER (OUTPATIENT)
Dept: BEHAVIORAL HEALTH | Facility: CLINIC | Age: 39
End: 2019-11-14
Attending: SOCIAL WORKER
Payer: COMMERCIAL

## 2019-11-14 ENCOUNTER — TELEPHONE (OUTPATIENT)
Dept: OTHER | Facility: OUTPATIENT CENTER | Age: 39
End: 2019-11-14

## 2019-11-14 ENCOUNTER — OFFICE VISIT (OUTPATIENT)
Dept: OTHER | Facility: OUTPATIENT CENTER | Age: 39
End: 2019-11-14
Payer: COMMERCIAL

## 2019-11-14 DIAGNOSIS — F91.8 OTHER CONDUCT DISORDERS: Primary | ICD-10-CM

## 2019-11-14 DIAGNOSIS — F41.1 GENERALIZED ANXIETY DISORDER: ICD-10-CM

## 2019-11-14 DIAGNOSIS — F43.10 POSTTRAUMATIC STRESS DISORDER: ICD-10-CM

## 2019-11-14 DIAGNOSIS — F10.21 ALCOHOL USE DISORDER, MODERATE, IN EARLY REMISSION, DEPENDENCE (H): ICD-10-CM

## 2019-11-14 DIAGNOSIS — F33.0 MAJOR DEPRESSIVE DISORDER, RECURRENT EPISODE, MILD (H): ICD-10-CM

## 2019-11-14 PROCEDURE — H2035 A/D TX PROGRAM, PER HOUR: HCPCS

## 2019-11-14 NOTE — PROGRESS NOTES
"Treatment Contract       We want you to succeed in your IOP Treatment for your substance use disorder. The clinical team is concerned that your non-attendance in scheduled group sessions may impede your success in reaching treatment for recovery goals. may get in the way of reaching your treatment goals. The clinical team has held a care conference to address concerns.     The following treatment contract will help you get back on track.    I understand the following behavior does not let me fully take part in chemical dependency treatment at [facility]:  _X__ Excessive or unexcused absences from group sessions  ____ Disruptive, disrespectful, intimidating or uncooperative behavior  ____ Using chemicals  ____ Failure to follow program guidelines or treatment goals  ____ Possible undiagnosed or untreated mental health issues  ____ Other:     As shown by:    To continue in substance use treatment at [facility], I must:  __X_ Attend all group sessions as scheduled, currently 9:00 AM - 12:00 PM on Monday and Wednesday of each week.  __X_ Finish treatment goal assignments in a timely manner  ____ Demonstrate behavior consistent with recovery  ____ Have a psychological (mental health) exam  ____ Sign releases of information for\" ____ for the purpose of ____  ____ Other:      If the above goals are not met, I understand I will be discharged (sent home) from this outpatient program and given referrals for other care or services.      _11/14/2019______      ______________________________________________    Date/Time   Client signature    _11/14/2019______      ______________________________________________    Date/Time   Staff signature    ________________      ______________________________________________    Date/Time   Manager/Lead counselor signature                                      "

## 2019-11-15 NOTE — PROGRESS NOTES
.[facility] [place] [facility]  Group Progress Note    Date of Service: 11/14/19  Client Name: Shawn Camejo  YOB: 1980  MRN:  [id number]  Treating Provider: Venita Lares, PhD and Shireen Pratt, PhD, Postdoctoral Fellow  Type of Session: Group  Number of Minutes: 120 min      Health Maintenance Summary - Mental Health Treatment Plan       Status Date      MENTAL HEALTH TX PLAN Next Due 8/11/2020      Done 9/11/2019 HIM MENTAL HEALTH TX PLAN SCAN     Done 9/28/2018         Current Symptoms/Status:    Compulsive Sexual Behavior: He saw a escort and he has been texting others. He reported the presence of sexual fantasies every day. Sexual risk activity: he had sex without condom with a known escort.      Depression: Permanent moderate depressive state is the current status.The sleeping difficulties continues.     Anxiety: Client reported a little bit less anxious compared to previous week. He endorses the following anxiety symptoms: Nervous; Worry: difficulties controlling; Irritability and concentration difficulties.     Alcohol Use Disorder: Client was able to stay within his boundaries. He has skipped AA meetings.     Posttraumatic Stress Disorder: Not being diagnosed today.      Progress Toward Treatment Goals:   Client continues to show interest and commitment towards receiving services at [facility].  Client joined CSB group 4/20/19. Remains motivated to continue with group sessions.  He setting up an appointment with his health provider Bhargav Nolasco PA-C., to talk about increasing the dosage of naltrexone.    Intervention: Modality and Description:  Utilized supportive and CBT techniques were used to address CSB and related mental health issues.The focus of today's session was to addressing his difficulties adjusting to the accountability of the treatment.    Response to Intervention:  He acknowledged that he is lacking of his responsibilities. He has committed to improving his sexual boundaries and  improving self-care.      Assignment:  - Continue attending CSB and AA group (permanent assignment).  - Updating and setting his sexual and alcohol boundaries (new assignment).    Interactive Complexity:  N/A      Diagnosis:  312.89 (F91.8) Other Specified Disruptive, Impulsive-Control, and Conduct Disorder (hypersexuality)   296.31 (F33.0)  Major Depressive Disorder, mild, recurrent   300.02 (F41.1)  Generalized Anxiety Disorder   309.81 (F43.10) Posttraumatic Stress Disorder   303.90 (F10.20) Alcohol Use Disorder      Plan / Need for Future Services:  Return for individual therapy every week to address CSB. Continue weekly group therapy.    Shireen Pratt, PhD, Postdoctoral Fellow

## 2019-11-15 NOTE — TELEPHONE ENCOUNTER
I think that would be reasonable if the patient is agreeable.  Naltrexone 50 mg, 2 tablets daily, #60, no rf.  He could be scheduled with me in a month, but should begin the increased dosage now.

## 2019-11-18 ENCOUNTER — HOSPITAL ENCOUNTER (OUTPATIENT)
Dept: BEHAVIORAL HEALTH | Facility: CLINIC | Age: 39
End: 2019-11-18
Attending: SOCIAL WORKER
Payer: COMMERCIAL

## 2019-11-18 PROCEDURE — H2035 A/D TX PROGRAM, PER HOUR: HCPCS | Mod: HQ

## 2019-11-20 ENCOUNTER — HOSPITAL ENCOUNTER (OUTPATIENT)
Dept: BEHAVIORAL HEALTH | Facility: CLINIC | Age: 39
End: 2019-11-20
Attending: SOCIAL WORKER
Payer: COMMERCIAL

## 2019-11-20 PROCEDURE — H2035 A/D TX PROGRAM, PER HOUR: HCPCS | Mod: HQ

## 2019-11-21 ENCOUNTER — OFFICE VISIT (OUTPATIENT)
Dept: OTHER | Facility: OUTPATIENT CENTER | Age: 39
End: 2019-11-21
Payer: COMMERCIAL

## 2019-11-21 DIAGNOSIS — F10.21 ALCOHOL USE DISORDER, MODERATE, IN EARLY REMISSION, DEPENDENCE (H): ICD-10-CM

## 2019-11-21 DIAGNOSIS — F33.0 MAJOR DEPRESSIVE DISORDER, RECURRENT EPISODE, MILD (H): ICD-10-CM

## 2019-11-21 DIAGNOSIS — F41.1 GENERALIZED ANXIETY DISORDER: ICD-10-CM

## 2019-11-21 DIAGNOSIS — F43.10 POSTTRAUMATIC STRESS DISORDER: ICD-10-CM

## 2019-11-21 DIAGNOSIS — F91.8 OTHER CONDUCT DISORDERS: Primary | ICD-10-CM

## 2019-11-22 NOTE — PROGRESS NOTES
[facility] -  Case Progress Note    Date of Service: 11/21/19  Client Name: Shawn Camejo  YOB: 1980  MRN:  [id number]  Treating Provider: Shireen Pratt, PhD, Postdoctoral Fellow  Type of Session: Individual  Present in Session: Client only  Number of Minutes: 53 min    Health Maintenance Summary - Mental Health Treatment Plan       Status Date      MENTAL HEALTH TX PLAN Next Due 8/11/2020      Done 9/11/2019 HIM MENTAL HEALTH TX PLAN SCAN     Done 9/28/2018         Current Symptoms/Status:    Compulsive Sexual Behavior: Pattern of engaging in sexual relationships outside his relationship, causing anxiety, worry, distress and conflict in relationship, thoughts and urges to act out sexually, boundary violations He had difficulty controlling his urges to view pornographic content and escort ads. He reported the presence of sexual fantasies several times a week.      Depression: The current status is permanent moderate depressive state. The sleeping difficulties continues. He endorses the following depression symptoms: Feel flat, empty, and numb; Depressed/Hopeless; Worthlessness; ? Self-Esteem/Guilt     Anxiety: Client reported a little more anxious compared to previous week. He endorses the following anxiety symptoms: Worry: difficulties controlling; Irritability and Fear/Dread.        Progress Toward Treatment Goals:   Session # 6 - Client continues to show interest and commitment towards receiving services at [facility].  Client joined CSB group 4/20/19. Remains motivated to continue with group sessions.  He is in an AA treatment group at [facility] (attend 3/3 meetings this week).  He had an appointment with his health provider Bhargav Nolasco PA-C., and they decided increase naltrexone dose.  He completed his assignment regarding using the coping style charts (positive/negative) to review the notes made on his journal.    Intervention: Modality and  Description:  Individual, interpersonal, CBT. The focus of today's session was to following his progress and discussing an intervention on his sleeping difficulties.    Response to Intervention:  Client reported progress in his behaviors applying what he learned from his assignment. Although he has not seen progress in his symptoms. He is trying to be more responsible to himself most part of his time. He understood that he would need to invest more attention of this in his free time, a situation that usually triggers his compulsive behavior. He would like to take further steps toward his negative states and the updating of his boundaries regarding his sexual behavior. He was receptive to starting this by applying sleep hygiene.    Assignment:  - Continue attending CSB and AA group (permanent assignment).  - Applying sleep hygiene using a sleep hygiene handout and what was discussed in session (new assignment).    Interactive Complexity:  N/A      Diagnosis:  312.89 (F91.8) Other Specified Disruptive, Impulsive-Control, and Conduct Disorder (hypersexuality)   296.31 (F33.0) Major Depressive Disorder, mild, recurrent   300.02 (F41.1) Generalized Anxiety Disorder           Plan / Need for Future Services:  Return for individual therapy every week to address CSB. Continue weekly group therapy.     Shireen Pratt, PhD, Postdoctoral Fellow

## 2019-11-23 NOTE — PROGRESS NOTES
Shawn Camejo  [id number]               Adult CD Progress Note and Treatment Plan Review     Attendance                               Monday                                                                     Group Date: 11/18/2019   Group Attendance Attended group session   Group Therapy Type Addiction; Psychoeducation   Group Topic Covered Mindfulness Practice, Disease of Addiction, Relapse Prevention, Grief & Loss   Client's Response To Group Topic Discussed personal experience with topic Listened actively   Client Group Participation Detail Highly involved   Group Attendance (Time) 3.0 Hours   Individual Attendance None   Family Attendance None   Group session summary: Group Session opened with mindfulness practice and review of previous week's psychoeducation topics. Clients participated in check in process, including additional information to help new clients integrate into group. Reviewed group process guidelines. Each group member was paired with a partner to create poster on one previous topic, to present for next session. Due to news of tragic loss of community member, final hour focused on processing feelings, fears, and safety planning.                                 Wednesday                                                                     Group Date: 11/20/2019   Group Attendance Attended Group Session   Group Therapy Type  Addiction; Psychoeducation   Group Topic Covered  Mindfulness, Self-Sabotage in Recovery, Recovery Lifestyle Needs    Client's Response To Group Topic  Shared personal experience; provided effective feedback to group peers; demonstrated willingness to change based on practicing model statements during group session   Client Group Participation Detail  Highly Engaged   Group Attendance (Time)  3.0 Hours   Individual Attendance  None   Family Attendance  None   Group session summary: This group session opened with psychoeducation on mindfulness and review of a mindfulness  practice; clients will practice mindfulness moments and share reflections at next group session. Psychoeducation on self-sabotage attitudes and behaviors, with review and self-evaluation. Each client then shared 1-2 self-sabotage attitudes he felt was most significant obstacle to healthy recovery. Clients encouraged to increase awareness of selected self-sabotage items, including beginning to identify any automatic negative thoughts and cognitive distortions. Client will share reflections gained at next group session.      Total # of Phase 1 Group Sessions: 19     Total # of Phase 2 Group Sessions:   Total # of Phase 3 Group Sessions:   Total # of 1:1 Sessions: 3    Support group attended this week: no    Reporting sobriety: Yes; Reports use episode - Current last date of use: 10/14/2019; Client did not attend group sessions during this review period. He reported by phone on 11/07/2019 that he had use episode.  Treatment Plan Review     Treatment Plan Review completed on:  11/20/2019   Projected discharge date: 11/21/2019    Client preferred learning style: Visual  Hands on  Verbal    Staff member(s) contributing: Lucille Castellanos MFA, MA, [facility]  Received supervision: No.  Client involvement with treatment planning: contributed to goals and plan.  Client received copy of plan/revised plan: Yes  Client agrees with plan/revised plan: Yes  Changes to Treatment Plan: No    Client's Dimension 1 Goal(s) Develop effective strategies to maintain sobriety   See information provided   Client's Dimension 2 Goal(s) Obtain a physical evaluation. See information provided   Client's Dimension 3 Goal(s) Client will learn and utilize coping skills learned in the program to manage symptoms of depression and anxiety more effectively, reducing his PHQ9 score from 10 <6. See information provided   Client's Dimension 4 Goal(s) Increase awareness with how substance use is conflicted with personal values and address any ambivalence to change  See information provided   Client's Dimension 5 Goal(s) Increase awareness of the disease concept of addiction and insight into personal relapse process, triggers and strategies to address See information provided   Client's Dimension 6 Goal(s) Expand sober support network and be involved in sober activities. See information provided     New Goals added since last review: None.  Goal(s) worked on since last review: None.   Strategies effective: Yes  Treatment Coordination Activities: None.    Medical, Mental Health and other appointments the client attended: Yes - Client reports continuing consultation with health care and mental health providers..  Medication issues: None. Medications: Naltrexone (DEPADE/REVIA) 50 MG tablet, sertraline (ZOLOFT) 100 MG tablets  Physical and mental health problems: MH Diagnosis, as listed.      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 Client was recommended to consult with health care provider about increasing prescribed Naltrexone daily dosage; client reports change from 50mg to 100mg daily.. Client denies current symptoms of withdrawal.    Dimension 2 0 Client denies current medical concerns.  Client reports intention to focus on increasing sleep time and consistency for self-care and building more consistent daily structure to minimize risks. He rates overall health at 7/10.    Dimension 3 2 Client denies SI, Intent, or thoughts of self-harm to self or others. Client reports attending scheduled session with therapist. Client rates  current symptoms on scale of 0-10, including: sleeplessness (6), fatigue (6), irritability (7), difficulty concentrating (4), sadness (5), and hopelessness (5).  He reports taking prescribed sertraline, 100 mg daily.     Dimension 4 2 Due to recent use episode, increased Risk Factor from 1 to 2. Client reports increased motivation, currently rating at 8/10, noting decisions to focus on self-care and establishing consistent daily structure and reduce exposure to high risks.     Dimension 5 3 Client has co-occurring MH concerns that increase risk of relapse;  has history and continuing tendency to isolate; lacks sober support network. Client identifying attitudes and behaviors that increase cycles of negativity, risks of increased symptoms of depression, isolation, and compulsive behaviors. Client reports current cravings at 3/10. He notes biggest trigger is loneliness after a stressful day at work. Client denies attending sober support meetings during past week.  Dimension 6 2 Client reports continuing frustration and stress about his job. He rates current living situation at 5, with 1 being very helpful and 10 being very stressful; he reports that he lives in sober house, but does not feel connected.  Review and evaluation of the individual abuse prevention plan: The program's individual abuse prevention plan (IAPP) is sufficient for this client.     Data: (Need to include short narrative for the week on what was worked on)  offered feedback good insight client did actively participate   Client previously met with counselor about inconsistent attendance and minimal engagement in group. Client discussed particular areas that presented obstacles to his recovery, including issues with money, loneliness, daily structure including consistent sleep patterns.     Intervention:    Client signed treatment contract for consistent attendance and completion of treatment plan assignments.     Assessment:    Stages of Change  Model  Contemplation    Appears/Sounds:  Depressed   Client presents with increased motivation to attend and engage in IOP Treatment groups and process. Client attended all group sessions, and actively engaged in group process. Client presented with some increased motivation for change, sharing insights with group about several specific actions he intended to take to improve daily structure, connect with sober support, and adapt attitude at workplace.    Plan:   - Client will continue to attend all group sessions, meet with counselor, and complete treatment plan assignments.    Lucille Castellanos MFA, MA, LADC  November 20, 2019

## 2019-11-23 NOTE — ADDENDUM NOTE
Encounter addended by: Lucille Castellanos [facility] on: 11/23/2019 1:26 AM   Actions taken: Episode edited, Clinical Note Signed

## 2019-11-25 ENCOUNTER — OFFICE VISIT (OUTPATIENT)
Dept: OTHER | Facility: OUTPATIENT CENTER | Age: 39
End: 2019-11-25
Payer: COMMERCIAL

## 2019-11-25 DIAGNOSIS — F33.0 MAJOR DEPRESSIVE DISORDER, RECURRENT EPISODE, MILD (H): ICD-10-CM

## 2019-11-25 DIAGNOSIS — F43.10 POSTTRAUMATIC STRESS DISORDER: ICD-10-CM

## 2019-11-25 DIAGNOSIS — F41.1 GENERALIZED ANXIETY DISORDER: ICD-10-CM

## 2019-11-25 DIAGNOSIS — F91.8 OTHER CONDUCT DISORDERS: Primary | ICD-10-CM

## 2019-11-25 DIAGNOSIS — F10.21 ALCOHOL USE DISORDER, MODERATE, IN EARLY REMISSION, DEPENDENCE (H): ICD-10-CM

## 2019-11-25 NOTE — PROGRESS NOTES
[facility] -  Case Progress Note    Date of Service: 11/25/19  Client Name: Shawn Camejo  YOB: 1980  MRN:  [id number]  Treating Provider: Shireen Pratt, PhD, Postdoctoral Fellow  Type of Session: Individual  Present in Session: Client only  Number of Minutes: 53 min    Health Maintenance Summary - Mental Health Treatment Plan       Status Date      MENTAL HEALTH TX PLAN Next Due 8/11/2020      Done 9/11/2019 [facility] MENTAL HEALTH TX PLAN SCAN     Done 9/28/2018           Current Symptoms/Status:    Compulsive Sexual Behavior: pattern of engaging insexual behaviors outside his relationship that cause anxiety, worry, and conflict in relationship, thoughts and urges to act out sexually, boundary violations,  He was able to control his urges to viewing/browsing escorts ads. He was unable to control his urges to masturbate using pornographic content.     Depression: The current status is permanent moderate depressive state. He endorses the following depression symptoms: Depression, Feel flat; Empty; Worthlessness; and ? Self-Esteem.      Progress Toward Treatment Goals:   Session # 7 - Client continues to show interest and commitment towards receiving services at [facility].  Client joined CSB group 4/20/19. Remains motivated to continue with group sessions.  [facility]committed to attend 3/3 meetings this week).  No side effects have been reported due to naltrexone-increased dose.  Continued working on his assignments.  Client was able to stay within his sexual boundaries.    Intervention: Modality and Description:  Individual, interpersonal, CBT. The focus of today's session was on opening some past traumas. A brief controlled emotional experience was performed.    Response to Intervention:  Client has been applying sleep hygiene and he created a log to report the time he went to sleep. He took better care of his free  time and he used that time to do things that do not trigger his compulsiveness. During the weekend, he associated some of his current behaviors with behaviors when he was a child/teenager and had a lot of free time. It triggered him some bad memories of the past and what he did to escape of his problems at his parents' house. He would like to start working on his past traumas and the influence this has on his life today, but he understands that first he will need to be minimally emotionally stable for that.     Assignment:  - Continue attending CSB and AA group (permanent assignment).  - Applying sleep hygiene using a sleep hygiene handout and what was discussed in session (progressing assignment).  - Updating and setting his sexual, alcohol, gabling boundaries (new assignment).    Interactive Complexity:  N/A      Diagnosis:  312.89 (F91.8) Other Specified Disruptive, Impulsive-Control, and Conduct Disorder (hypersexuality)   296.31 (F33.0) Major Depressive Disorder, mild, recurrent       Plan / Need for Future Services:  Return for individual therapy every week to address CSB. Continue weekly group therapy.     Shireen Pratt, PhD, Postdoctoral Fellow

## 2019-11-25 NOTE — PROGRESS NOTES
[facility]  Group Progress Note     Date of Service: 11/21/19  Client Name: Shawn Camejo  YOB: 1980  MRN:  [id number]  Treating Provider: Venita Lares, PhD and Shireen Pratt, PhD, Postdoctoral Fellow  Type of Session: Group  Number of Minutes: 120 min              Health Maintenance Summary - Mental Health Treatment Plan        Status Date       MENTAL HEALTH TX PLAN Next Due 8/11/2020         Done 9/11/2019 [facility] MENTAL HEALTH TX PLAN SCAN       Done 9/28/2018            Current Symptoms/Status:     Compulsive Sexual Behavior: Client was able to stay within his sexual boundaries. He had difficulty controlling his urges to view pornographic content and escort ads. He reported the presence of sexual fantasies several times a week. He had some urges to violate his boundaries.     Depression: Permanent moderate depressive state is the current status.The sleeping difficulties continues. He endorses the following depression symptoms: Feel flat, empty, and numb; Depressed/Hopeless; Worthlessness; ? Self-Esteem/Guilt     Anxiety: Client reported a little more anxious compared to previous week. He endorses the following anxiety symptoms: Worry: difficulties controlling; Irritability and Fear/Dread.    Alcohol Use Disorder: Client was able to stay within his boundaries.     Posttraumatic Stress Disorder: Not being diagnosed today.      Progress Toward Treatment Goals:   Client continues to show interest and commitment towards receiving services at [facility].  Client joined CSB group 4/20/19. Remains motivated to continue with group sessions.  He is in an AA treatment group at [facility] (attended 3/3 meetings).  He had a an appointment with his health provider Bhargav Nolasco PA-C., and they decided increase naltrexone dose.    Intervention: Modality and Description:  Utilized supportive and CBT techniques were used to address CSB and related mental health  issues. The focus of today's session was to following his progress.    Response to Intervention:  He has been able to cope better this past week with his compulsive sexual behavior. He acknowledged that part of this was his attitudes respecting his boundaries and using positive copping. He did not get improvement in symptoms but notice improvement in behaviors.    Assignment:  - Continue attending CSB and AA group (permanent assignment).  - Updating and setting his sexual and alcohol boundaries (progressing assignment).     Interactive Complexity:  N/A      Diagnosis:  312.89 (F91.8) Other Specified Disruptive, Impulsive-Control, and Conduct Disorder (hypersexuality)   296.31 (F33.0)  Major Depressive Disorder, mild, recurrent   300.02 (F41.1)  Generalized Anxiety Disorder   309.81 (F43.10) Posttraumatic Stress Disorder   303.90 (F10.20) Alcohol Use Disorder      Plan / Need for Future Services:  Return for individual therapy every week to address CSB. Continue weekly group therapy.     Shirene Pratt, PhD, Postdoctoral Fellow

## 2019-11-26 ENCOUNTER — HOSPITAL ENCOUNTER (OUTPATIENT)
Dept: BEHAVIORAL HEALTH | Facility: CLINIC | Age: 39
End: 2019-11-26
Attending: SOCIAL WORKER
Payer: COMMERCIAL

## 2019-11-26 PROCEDURE — H2035 A/D TX PROGRAM, PER HOUR: HCPCS | Mod: HQ

## 2019-11-30 NOTE — ADDENDUM NOTE
Encounter addended by: Lucille Castellanos [facility][note 2] on: 11/29/2019 9:35 PM   Actions taken: Episode edited, Pend clinical note, Clinical Note Signed

## 2019-11-30 NOTE — PROGRESS NOTES
Shawn Camejo  [id number]               Adult CD Progress Note and Treatment Plan Review     Attendance                                  Tuesday                                                                     Group Date: 11/26/2019   Group Attendance Attended group session   Group Therapy Type Addiction; Psychoeducation   Group Topic Covered Mindfulness Practice, Disease of Addiction, Relapse Prevention, Grief & Loss   Client's Response To Group Topic Discussed personal experience with topic Listened actively   Client Group Participation Detail Highly involved   Group Attendance (Time) 3.0 Hours   Individual Attendance None   Family Attendance None   Group session summary: Today's group session opened with 5-4-3-2-1 Mindfulness Practice, including additional standing mindfulness practice. The pscyhoeducation topic included a Lifestyle Balance Survey that clients completed, sharing two lifestyle goals identified as key to healthy recovery at this time. The group continued with process by learning about changing habit patterns, including motivation to change, rigid thinking, and challenge thoughts. Using SMART goals, each client established immediate short-term, manageable goals for two lifestyle changes most wanting to implement. In check-out, clients shared one items learned today that they found most helpful to his/her recovery.                                Wednesday                                                                     Group Date: 11/27/2019   Group Attendance GROUP SESSION CANCELLED DUE TO SEVERE WEATHER.   Group Therapy Type     Group Topic Covered     Client's Response To Group Topic     Client Group Participation Detail     Group Attendance (Time)     Individual Attendance     Family Attendance     Group session summary:           Total # of Phase 1 Group Sessions: 20     Total # of Phase 2 Group Sessions:   Total # of Phase 3 Group Sessions:   Total # of 1:1 Sessions: 3    Support group  attended this week: no    Reporting sobriety: Yes; Reports use episode - Current last date of use: 10/14/2019; Client did not attend group sessions during this review period. He reported by phone on 11/07/2019 that he had use episode.  Treatment Plan Review     Treatment Plan Review completed on:  11/26/2019   Projected discharge date: 11/21/2019    Client preferred learning style: Visual  Hands on  Verbal    Staff member(s) contributing: Lucille Castellanos MFA, MA, TUANC  Received supervision: No.  Client involvement with treatment planning: contributed to goals and plan.  Client received copy of plan/revised plan: Yes  Client agrees with plan/revised plan: Yes  Changes to Treatment Plan: No    Client's Dimension 1 Goal(s) Develop effective strategies to maintain sobriety   See information provided   Client's Dimension 2 Goal(s) Obtain a physical evaluation. See information provided   Client's Dimension 3 Goal(s) Client will learn and utilize coping skills learned in the program to manage symptoms of depression and anxiety more effectively, reducing his PHQ9 score from 10 <6. See information provided   Client's Dimension 4 Goal(s) Increase awareness with how substance use is conflicted with personal values and address any ambivalence to change See information provided   Client's Dimension 5 Goal(s) Increase awareness of the disease concept of addiction and insight into personal relapse process, triggers and strategies to address See information provided   Client's Dimension 6 Goal(s) Expand sober support network and be involved in sober activities. See information provided     New Goals added since last review: None.  Goal(s) worked on since last review: None.   Strategies effective: Yes  Treatment Coordination Activities: None.    Medical, Mental Health and other appointments the client attended: Yes - Client reports continuing consultation with health care and mental health providers..  Medication issues: None.  Medications: Naltrexone (DEPADE/REVIA) 50 MG tablet, sertraline (ZOLOFT) 100 MG tablets  Physical and mental health problems: MH Diagnosis, as listed.     MH DSM-V Diagnosis:   (Diagnosed by [facility] Psychologist Lux Bravo, PhD)  Other Specified Disruptive, Impulsive-Control, and Conduct Disorder (hypersexuality) (F91.8)  Major Depressive Disorder, mild, recurrent (F33.0)  Generalized Anxiety Disorder (F41.1)  Posttraumatic Stress Disorder (F43.10)    Substance Use Disorders:    303.90 (F10.20) Alcohol Use Disorder Moderate    ASAM Risk Rating: (Note the rationale for risk rating changes)    Dimension 1 1 Client reports use incident 11/04/2019. Client was recommended to consult with health care provider about increasing prescribed Naltrexone daily dosage; client reports change from 50mg to 100mg daily..     Dimension 2 0 Client denies current medical concerns.  Client reports intention to focus on increasing sleep time and consistency for self-care and building more consistent daily structure to minimize risks. He rates overall health at 7/10.     Dimension 3 2 Client denies SI, Intent, or thoughts of self-harm to self or others. Client reports attending scheduled session with therapist. Client rates current symptoms on scale of 0-10, including: sleeplessness (4), fatigue (4), irritability (3), difficulty concentrating (2), sadness (3), and hopelessness (2). Client's ratings indicate decline in severity of symptoms; client reports feeling more hopeful about managing daily life and building healthy recovery. He reports taking prescribed sertraline, 100 mg daily.     Dimension 4 2 Due to recent use episode, increased Risk Factor from 1 to 2. Client reports increased motivation, currently rating at 8/10, noting decisions to focus on self-care and establishing consistent daily structure and reduce exposure to high risks.     Dimension 5 3 Client has co-occurring MH concerns that increase risk of relapse;  has history  and continuing tendency to isolate; lacks sober support network. Client identifying attitudes and behaviors that increase cycles of negativity, risks of increased symptoms of depression, isolation, and compulsive behaviors. Client reports current cravings at 3/10. He notes biggest trigger is loneliness after a stressful day at work. Client denies attending sober support meetings during past week.  Dimension 6 2 Client reports continuing frustration and stress about his job. He rates current living situation at 5, with 1 being very helpful and 10 being very stressful; he reports that he lives in sober house, but does not feel connected.    Review and evaluation of the individual abuse prevention plan: The program's individual abuse prevention plan (IAPP) is sufficient for this client.     Data: (Need to include short narrative for the week on what was worked on)  offered feedback good insight client did actively participate   Client shared that he feels significantly more hopeful about his ability to build a healthy recovery. He reports that information provided in program at [facility] and this IOP Co-Occurring Group is consistent, and has helped him understand at this time that making changes in sleep habits and daily structure are key to establishing some control in his life, and to build resilience that will minimize risky attitudes and behaviors.     Intervention:   Counselor employed Cognitive Behavioral Therapy (CBT), Motivational Interviewing (MI), client-centered positive regard, and Twelve-Step Facilitation. Counselor further provided information on models for daily planning and task completion, mindfulness strategies to help manage negative-thought processes, feelings of resentment, and symptoms of anxiety.    Assessment:    Stages of Change Model  Contemplation    Appears/Sounds:  Depressed   Client presents with increased motivation to attend and engage in IOP Treatment groups and process.  Client reports that he is feeling more hopeful and confident, and he presents with increased energy, more positive reflections during group session, and is setting specific intentions for using strategies to manage sleep and daily tasks.    Plan:   - Client will continue to attend all group sessions, meet with counselor, and complete treatment plan assignments.    Lucille Castellanos MFA, MA, [facility]  November 20, 2019    NOTE: Document opened on 11/26/2019 and information charted. Tech issues required re-post of document on 11/29/2019. No changes were made.

## 2019-12-02 ENCOUNTER — OFFICE VISIT (OUTPATIENT)
Dept: OTHER | Facility: OUTPATIENT CENTER | Age: 39
End: 2019-12-02
Payer: COMMERCIAL

## 2019-12-02 ENCOUNTER — HOSPITAL ENCOUNTER (OUTPATIENT)
Dept: BEHAVIORAL HEALTH | Facility: CLINIC | Age: 39
End: 2019-12-02
Attending: SOCIAL WORKER
Payer: COMMERCIAL

## 2019-12-02 DIAGNOSIS — F10.21 ALCOHOL USE DISORDER, MODERATE, IN EARLY REMISSION, DEPENDENCE (H): ICD-10-CM

## 2019-12-02 DIAGNOSIS — F33.0 MAJOR DEPRESSIVE DISORDER, RECURRENT EPISODE, MILD (H): ICD-10-CM

## 2019-12-02 DIAGNOSIS — F41.1 GENERALIZED ANXIETY DISORDER: ICD-10-CM

## 2019-12-02 DIAGNOSIS — F43.10 POSTTRAUMATIC STRESS DISORDER: ICD-10-CM

## 2019-12-02 DIAGNOSIS — F91.8 OTHER CONDUCT DISORDERS: Primary | ICD-10-CM

## 2019-12-02 PROCEDURE — H2035 A/D TX PROGRAM, PER HOUR: HCPCS | Mod: HQ

## 2019-12-02 NOTE — PROGRESS NOTES
I did not personally see the patient.  I reviewed and agree with the assessment and plan as documented in this note.     Noehlia Boothe PsyD, LP

## 2019-12-02 NOTE — PROGRESS NOTES
[facility] -  Case Progress Note    Date of Service: 12/02/19  Client Name: Shawn Camejo  YOB: 1980  MRN:  [id number]  Treating Provider: Shireen Pratt, PhD, Postdoctoral Fellow  Type of Session: Individual  Present in Session: Client only  Number of Minutes: 53 min    Health Maintenance Summary - Mental Health Treatment Plan       Status Date      MENTAL HEALTH TX PLAN Next Due 8/11/2020      Done 9/11/2019 [facility] MENTAL HEALTH TX PLAN SCAN     Done 9/28/2018           Current Symptoms/Status:    Compulsive Sexual Behavior: pattern of engaging in compulsive sexual behaviors causing anxiety, worry, critical internal messages about sex, Thoughts and urges to act out sexually on a daily basis. Client was unable to stay within his sexual boundaries. He saw an escort and he went to a strip club and [employer].      Depression: The current status is permanent moderate depressive state. He endorses the following depression symptoms: Depressed/Hopeless and ? Self-Esteem/Guilt      Alcohol Use Disorder: pattern of alcohol use that is excessive and causes anxiety, worry and interferes with social and emotional relationships, thoughts and urges to drink alcohol.  Risk situation was reported.          Progress Toward Treatment Goals:   Session # 8 - Client continues to show interest and commitment towards receiving services at [facility].  Client joined CSB group 4/20/19. Remains motivated to continue with group sessions.  He is in an AA treatment group at [facility] (committed to attend 3/3 meetings this week).  No side effects have been reported due to naltrexone-increased dose.  Continued working on his assignments.  Client was able to stay within his boundaries regarding alcohol.  Client reports some reduction in anxiety.    Intervention: Modality and Description:  Individual, interpersonal, CBT. The focus of today's session was on planning steps to get  self-control on other things in his life.    Response to Intervention:  Client reported progress with his symptoms in general, especially his permanent sleeping difficulties. He stated that his has been committed with his sleep hygiene. He is happy to be able to exercise control over something. On the other hand, he has acted compulsively because he was unable to deal well with being alone on holiday. He understood that he needs to be more aware of occasions when he will find himself alone. He has been wondering why he failed to apply the same diligence in tasks related to compulsive behavior and he realized that he will need to build control over small things first. In addition. three things have been identified that he will need to be put into practice: (1) setting updating boundaries; (2) being committed with realistic goals/steps; and (3) asking for help and getting social support on crisis situations.     Assignment:  - Continue attending CSB and AA group (permanent assignment).  - Applying sleep hygiene using a sleep hygiene handout and what was discussed in session (progressing assignment).  - Updating and setting his sexual, alcohol, gabling boundaries (progressing assignment).    Interactive Complexity:  N/A      Diagnosis:  312.89 (F91.8) Other Specified Disruptive, Impulsive-Control, and Conduct Disorder (hypersexuality)   296.31 (F33.0) Major Depressive Disorder, mild, recurrent   303.90 (F10.20) Alcohol Use Disorder (Moderate)       Plan / Need for Future Services:  Return for individual therapy every week to address CSB. Continue weekly group therapy.     Shireen Pratt, PhD, Postdoctoral Fellow

## 2019-12-02 NOTE — PROGRESS NOTES
Shawn Camejo  [id number]               Adult CD Progress Note and Treatment Plan Review     Attendance                                  Tuesday                                                                     Group Date: 12/02/2019   Group Attendance Attended group session   Group Therapy Type Addiction; Psychoeducation   Group Topic Covered Recovery Goals, Treatment Plan Goals, Disease of Addiction, Addictive Thinking vs. Recovery Attitudes, Cross-Addiction, CBT, and Mindfulness Practice   Client's Response To Group Topic Discussed personal experience with topic Listened actively   Client Group Participation Detail Highly involved   Group Attendance (Time) 3.0 Hours   Individual Attendance None   Family Attendance None   Group session summary: Group session opened with check-in process with each group member providing update on managing Thanksgiving holiday and multiple-day winter snowstorm. Clients shared experiences with struggles with isolation and/or family stressors over the holiday, and described strategies used to help manage. The group processed feelings about seeking relationships in early recovery, with focus on gaining understanding of need to keep focus on personal recovery; also processed risks of behaviors leading to cross-addictions. For the second half of group, reviewed Treatment Plans, including the Six Dimensions, Treatment Goals, and assigned Strategies. In continued focus, clients were provided information packets on Addictive Thinking, and with group reading, processed personal experiences with key points, including: perfectionism, grandiosity, acceptance of disease of addiction, reluctance to seek support, and ambivalence. The group session closed with each client sharing one point they were taking away from today's session, and sharing The Serenity Prayer.                                 Wednesday                                                                     Group Date:  "12/04/2019   Group Attendance Attended group session   Group Therapy Type  Addiction; psychoeducation   Group Topic Covered  Addictive Thinking; CBT; Powerlessness;    Client's Response To Group Topic  Discussed personal experience; provided effective insights;    Client Group Participation Detail  Highly engaged   Group Attendance (Time)  3.0 Hours   Individual Attendance  1.0 Hour   Family Attendance  None   Group session summary:  This group session continued focus on information packet: \"Addictive Thinking.\" Group members met in pairs to review the information and select specific items that most spoke to their personal experience. When pairs returned and shared reflections with group, each small group had selected the same information. The group engaged in deep processing of statement \"To overcome addictive thinking, you must clear away distorted thoughts by rooting out lies, denial, rationalization, and minimization.\" Further, the group addressed how experiences with grandiosity and perfectionism have been obstacles to moving out of addictive thinking. Information on \"Reservations\" for possible future use obstruct acceptance of disease of addiction. This client shared that he realized that he has been \"reserving\" continued behaviors, still believing that either some day or in some smaller ways or with only aspects of his sequence of addictive and compulsive behaviors. He stated felt need to complete process of accepting powerlessness over each individually to move forward.         Total # of Phase 1 Group Sessions: 24     Total # of Phase 2 Group Sessions:   Total # of Phase 3 Group Sessions:   Total # of 1:1 Sessions: 5    Support group attended this week: no    Reporting sobriety: Yes; Reports use episode - Current last date of use: 1 12/01/2019.  Treatment Plan Review     Treatment Plan Review completed on:  12/02/2019   Projected discharge date: 2-    Client preferred learning style: Visual  Hands " "on  Verbal    Staff member(s) contributing: Lucille Castellanos MFA, MA, [facility]  Received supervision: No.  Client involvement with treatment planning: contributed to goals and plan.  Client received copy of plan/revised plan: Yes  Client agrees with plan/revised plan: Yes  Changes to Treatment Plan: No    Client's Dimension 1 Goal(s) Develop effective strategies to maintain sobriety   See information provided   Client's Dimension 2 Goal(s) Obtain a physical evaluation. See information provided   Client's Dimension 3 Goal(s) Client will learn and utilize coping skills learned in the program to manage symptoms of depression and anxiety more effectively, reducing his PHQ9 score from 10 <6. See information provided   Client's Dimension 4 Goal(s) Increase awareness with how substance use is conflicted with personal values and address any ambivalence to change See information provided   Client's Dimension 5 Goal(s) Increase awareness of the disease concept of addiction and insight into personal relapse process, triggers and strategies to address See information provided   Client's Dimension 6 Goal(s) Expand sober support network and be involved in sober activities. See information provided     New Goals added since last review: Yes - Client met with counselor to review new goals. Goals will be added to Treatment Plan after further review on 12/09/2019..  Goal(s) worked on since last review: Client focused on assignments on the following goals: DIM 1 - \"Develop effective strategies to maintain sobriety;\" DIM 4 - \"Increase awareness of how substance use is conflicted with personal values;\" DIM 5 - \"increase insight into personal relapse process, triggers, and strategies to address.\"  Strategies effective: Yes  Treatment Coordination Activities: None.    Medical, Mental Health and other appointments the client attended: Yes - Client reports continuing consultation with health care and mental health providers..  Medication issues: " "None. Medications: Naltrexone (DEPADE/REVIA) 50 MG tablet, sertraline (ZOLOFT) 100 MG tablets  Physical and mental health problems:  Diagnosis, as listed.      DSM-V Diagnosis:   (Diagnosed by [facility] Psychologist Lux Bravo, PhD)  Other Specified Disruptive, Impulsive-Control, and Conduct Disorder (hypersexuality) (F91.8)  Major Depressive Disorder, mild, recurrent (F33.0)  Generalized Anxiety Disorder (F41.1)  Posttraumatic Stress Disorder (F43.10)    Substance Use Disorders:    303.90 (F10.20) Alcohol Use Disorder Moderate    ASAM Risk Rating: (Note the rationale for risk rating changes)    Dimension 1 1 Client reports use incident 12/02/2019; client reports significantly shorter time/cycle in series of addictive/compulsive behaviors. Client reports increasing Naltrexone from 50mg to 100 mg daily. .     Dimension 2 0 Client denies current medical concerns.  Client reports intention to focus on increasing sleep time and consistency for self-care and building more consistent daily structure to minimize risks. He rates overall health at 6/10.     Dimension 3 2 Client denies SI, Intent, or thoughts of self-harm to self or others. Client rates current symptoms on scale of 0-10, including: sleeplessness (0), fatigue (3), irritability (6), difficulty concentrating (3), sadness (8), and hopelessness (5). Client reports need to address co-dependent relationship and link to cross-addictions; client processing issues with psychologist.      Dimension 4 2 Due to recent use episode, increased Risk Factor from 1 to 2. Client reports motivation, rating at 10/10, noting his primary motivation for sobriety: \"I want a more fulfilling life; I want to feel better about who I am.\"    Dimension 5 3 Although client rates his motivation for sobriety at 10/10, he rates his commitment to his recovery in the past week at 3/10, rating the following actions for recovery: attendance at group sessions (5); sober support meetings (1); " "daily recovery/meditation readings (2); daily 10th Step practice (2); managing daily schedule (7); self-awareness of emotions and triggers (4); and treatment plan assignments (2). Client reports that in next week he intends to establish a payment plan for outstanding bills and attend sober support meetings.  Dimension 6 2 Client rates current living situation at 4, with 1 being very helpful and 10 being very stressful; he reports that he lives in sober house, but there is no accountability and no community. Client reports setting goal to get information on other sober living opportunities, and plan move by January 2020.    Review and evaluation of the individual abuse prevention plan: The program's individual abuse prevention plan (IAPP) is sufficient for this client.     Data: (Need to include short narrative for the week on what was worked on)  offered feedback good insight client did actively participate   Client met with counselor for 1:1 session for continued review of Treatment Plan. Client provided specific goals that he would like to have added to his Treatment Plan. Client shared additional information about realization about \"reserving\" future relapse. Client shared list of items that he wants to address, including short-term tasks that he needs to complete. Client consistently listed and added multiple tasks and goals, and presenting with beginning feelings of overwhelm. Provided information to client on time and task management strategies.     Intervention:   Counselor employed Cognitive Behavioral Therapy (CBT), Motivational Interviewing (MI), client-centered positive regard, and Twelve-Step Facilitation.   Assessment:    Stages of Change Model  Contemplation    Appears/Sounds:  Motivated  Engaged   Provided information and initial model for time management strategy re: 4 Quadrants from [employer] Time Management systems. Reviewed Quadrant I re: Urgent and Important, and likely consequences when most tasks " are defined and/or felt as both urgent and important. Client carefully reviewed general description of each quadrant and noted that he puts nearly all tasks in Quadrant I, becomes overwhelmed, and then to cope with overwhelm, shifts to Quadrant IV: not important and not urgent, spending hours on video games and then onto computer, online gambling, and sequence of behaviors continues, ending in complete isolation, feelings of shame and depression. Client presents with good insights into his feelings and behaviors. Client presents with strong analysis and critical thinking skills; he states he has had no previous experience with strategies for time management and manageable goal setting. Client requested additional information and resources.      Plan:   - Client will continue to attend all group sessions, meet with counselor, and complete treatment plan assignments.    Lucille Castellanos MFA, MA, [facility]  December 5, 2019

## 2019-12-04 ENCOUNTER — HOSPITAL ENCOUNTER (OUTPATIENT)
Dept: BEHAVIORAL HEALTH | Facility: CLINIC | Age: 39
End: 2019-12-04
Attending: SOCIAL WORKER
Payer: COMMERCIAL

## 2019-12-04 ENCOUNTER — OFFICE VISIT (OUTPATIENT)
Dept: OTHER | Facility: OUTPATIENT CENTER | Age: 39
End: 2019-12-04
Payer: COMMERCIAL

## 2019-12-04 DIAGNOSIS — F91.8 OTHER CONDUCT DISORDERS: ICD-10-CM

## 2019-12-04 PROCEDURE — H2035 A/D TX PROGRAM, PER HOUR: HCPCS

## 2019-12-04 PROCEDURE — H2035 A/D TX PROGRAM, PER HOUR: HCPCS | Mod: HQ

## 2019-12-04 RX ORDER — NALTREXONE HYDROCHLORIDE 50 MG/1
TABLET, FILM COATED ORAL
Qty: 60 TABLET | Refills: 3 | Status: SHIPPED | OUTPATIENT
Start: 2019-12-04

## 2019-12-04 RX ORDER — SERTRALINE HYDROCHLORIDE 100 MG/1
TABLET, FILM COATED ORAL
Qty: 135 TABLET | Refills: 0 | Status: SHIPPED | OUTPATIENT
Start: 2019-12-04

## 2019-12-04 NOTE — PATIENT INSTRUCTIONS
1)I recommend restarting AA.     2)I recommend attending SA.     3)Naltrexone 50 mg: Continue taking 100 mg/day.  We discussed an increase, however you would prefer to hold off for now.     4)Sertraline 100 mg: Take 1 1/2 tablets daily (dosage increase).     5)See me in 6 weeks

## 2019-12-04 NOTE — PROGRESS NOTES
[facility] - Med Management    Name:  Shawn Camejo   Aliases:    :  1980   MRN:  [id number]  Treating Provider: Bhargav Nolasco PA-C EdD     Medications at start of visit:  Outpatient Medications Prior to Visit   Medication Sig Dispense Refill     naltrexone (DEPADE/REVIA) 50 MG tablet Take 1 tablet (50mg) once daily 90 tablet 3     sertraline (ZOLOFT) 100 MG tablet Take 1 tablet (100 mg) by mouth daily 90 tablet 0     Facility-Administered Medications Prior to Visit   Medication Dose Route Frequency Provider Last Rate Last Dose     Self Administer Medications: Behavioral Services   Does not apply See Admin Instructions Ilya Moralez MD           Allergies:  Allergies   Allergen Reactions     No Known Allergies        Today's Visit    Current Complaint: Shawn presents for a recheck.  At their last appointment we made no changes to his regimen. Since that visit, he contacted the clinic to request an increase to his naltrexone.  He states that he had a relapse on gambling, losing $350 at the casino last Thursday. He states that his relationship is dysfunctional and has been working with his therapist on an exit plan. He states that he is noticing a circular relationship between his relationship difficulties and gambling.     Shawn states that he is motivated to address past trauma and it's role in his current situation.  He states that he has made some progress on getting some of his bills caught up.     The patient is not endorsing suicidal/homicidal ideation, active planning, intent or means.  The patient is not endorsing s/s suggestive of hypomania/jesus or psychosis.  The patient is endorsing good compliance with and efficacy of their medication regimen without s/e,however he would be interested in an increased dosage of his sertraline.       Review of Systems: A review of the following systems was negative: Opthalmic, ENT, Cardiovascular, Gastrointestinal, Genitourinary, Musculoskeletal,  Integumentary, Neurological, Endocrine, Respiratory, Hematologic, Immunologic      Chemical History: Denies usage of and cravings for alcohol, cannabis, heroin, methamphetamine, cocaine, prescription opioids/benzodiazepines.      Social History: Shawn is continuing to live in a sober house, but is looking for a new living situation.     Mental Status Exam: The patient's orientation, memory,  Attention, language and fund of knowledge are at their usual best baseline.  Grooming/Hygiene: adequate  Eye Contact: normal  Psychomotor: normal  Observed mood and affect: appropriate  Judgment: intact, with thoughtful decision making and insight  Speech: normal rate, rhythm, tone and volume  Thought processes: normal, with normal rate of thought  Associations:  No deficiency  Abnormal Thoughts: none      Assessment: This is a 40 yo male who carries the diagnosis of alcohol use disorder, MDD. The patient's questions were answered to their satisfaction regarding the plan as outlined below. Side effects, risks/benefits/alternatives regarding all psychiatric medications were discussed with the patient in detail.          Plan:    There are no Patient Instructions on file for this visit.     Total face-to-face time: 30 min    Counseling/Coordination of care greater than 50%.    Counseling/Coordination of care included: Educational counseling regarding psychiatric medications, side effects, interactions.

## 2019-12-05 ENCOUNTER — OFFICE VISIT (OUTPATIENT)
Dept: OTHER | Facility: OUTPATIENT CENTER | Age: 39
End: 2019-12-05
Payer: COMMERCIAL

## 2019-12-05 DIAGNOSIS — F91.8 OTHER CONDUCT DISORDERS: Primary | ICD-10-CM

## 2019-12-05 DIAGNOSIS — F41.1 GENERALIZED ANXIETY DISORDER: ICD-10-CM

## 2019-12-05 DIAGNOSIS — F43.10 POSTTRAUMATIC STRESS DISORDER: ICD-10-CM

## 2019-12-05 DIAGNOSIS — F33.0 MAJOR DEPRESSIVE DISORDER, RECURRENT EPISODE, MILD (H): ICD-10-CM

## 2019-12-05 DIAGNOSIS — F10.21 ALCOHOL USE DISORDER, MODERATE, IN EARLY REMISSION, DEPENDENCE (H): ICD-10-CM

## 2019-12-06 NOTE — PROGRESS NOTES
[facility]  Group Progress Note     Date of Service: 12/05/19  Client Name: Shawn Camejo  YOB: 1980  MRN:  [id number]  Treating Provider: Venita Lares, PhD and Shireen Pratt, PhD, Postdoctoral Fellow  Type of Session: Group  Number of Minutes: 120 min              Health Maintenance Summary - Mental Health Treatment Plan        Status Date       MENTAL HEALTH TX PLAN Next Due 8/11/2020         Done 9/11/2019 [facility] MENTAL HEALTH TX PLAN SCAN       Done 9/28/2018            Current Symptoms/Status:     Compulsive Sexual Behavior: Client was unable to stay within his sexual boundaries. He saw an escort and he went to a strip club. He reported the presence of sexual fantasies every day and he felt uncomfortable with that.    Depression: Permanent moderate depressive state is the current status. He endorses the following depression symptoms: Depressed/Hopeless and ? Self-Esteem/Guilt     Anxiety: Client reported less anxious compared to three previous week. He endorses the following anxiety symptoms: Nervous      Alcohol Use Disorder: Client was able to stay within his boundaries. Risk situation was reported.     Posttraumatic Stress Disorder: Not being diagnosed today.     Progress Toward Treatment Goals:   Client continues to show interest and commitment towards receiving services at [facility].  Client joined CSB group 4/20/19. Remains motivated to continue with group sessions.  He is in an AA treatment group at [facility] (attended 3/3 meetings).    Intervention: Modality and Description:  Utilized supportive and CBT techniques were used to address CSB and related mental health issues. The focus of today's session was to following his progress, difficulties and commitment to his self-care.    Response to Intervention:  He has been committed to putting action the last interventions regarding his sleep hygiene. He identified improvement in his symptoms in  general, especially his permanent difficulty sleeping. He could not avoid one of his trigger, at the same time he could not asked for help, and ended up acting sexually compulsive. He acknowledged that even though he has worked on many of his problems and developed positive coping, his feeling of need and lonely are very powerful conditions that he still does not know how to handle.    Assignment:  - Continue attending CSB and AA group (permanent assignment).  - Updating and setting his sexual, alcohol, gabling boundaries (progressing assignment).     Interactive Complexity:  N/A      Diagnosis:  312.89 (F91.8) Other Specified Disruptive, Impulsive-Control, and Conduct Disorder (hypersexuality)   296.31 (F33.0)  Major Depressive Disorder, mild, recurrent   300.02 (F41.1)  Generalized Anxiety Disorder   309.81 (F43.10) Posttraumatic Stress Disorder   303.90 (F10.20) Alcohol Use Disorder      Plan / Need for Future Services:  Return for individual therapy every week to address CSB. Continue weekly group therapy.     Shireen Pratt, PhD, Postdoctoral Fellow

## 2019-12-09 ENCOUNTER — HOSPITAL ENCOUNTER (OUTPATIENT)
Dept: BEHAVIORAL HEALTH | Facility: CLINIC | Age: 39
End: 2019-12-09
Attending: SOCIAL WORKER
Payer: COMMERCIAL

## 2019-12-09 PROCEDURE — H2035 A/D TX PROGRAM, PER HOUR: HCPCS | Mod: HQ

## 2019-12-09 NOTE — PROGRESS NOTES
Shawn Camejo  [id number]               Adult CD Progress Note and Treatment Plan Review     Attendance                                  Monday                                                                     Group Date: 12/09/2019   Group Attendance Attended group session   Group Therapy Type Addiction; Psychoeducation; Life Skills   Group Topic Covered Motivation; Relapse Prevention; Life-Management Skills   Client's Response To Group Topic Discussed personal experience with topic; demonstrated willingness to apply information    Client Group Participation Detail Highly involved   Group Attendance (Time) 3.0 Hours   Individual Attendance None   Family Attendance None   Group session summary: This group session opened with a check-in process: clients provided updates on current status, including risks, stressors, and challenges to recovery. Client shared strategies used to help manage, and insights gained. Each week, clients complete a weekly self-evaluation on personal motivation and commitment for treatment and recovery, rating recovery activities on scale of 1-10, including: attending group sessions, sober support meetings, daily recovery readings, daily 10th Step practice, work on treatment plan assignments, managing daily schedules, and meetings with counselors.   The group next focused on topic of self-management. Information on impact of substance use on cognition was presented, including: learning self-management strategies to implement now as the brain begins to heal. The Four Quadrants time-management and evaluation strategy was presented to the group. Each shared personal experiences with behavior patterns that tended to sabotage sense ability to focus on personal goals and complete tasks. At end of group, each client shared one insight gained from the time-management strategy.  Shawn shared that the four quadrants model helped him gain insight on two behavior patterns that lead to increased  stress and trigger cycle of using behavior: 1) most tasks become urgent and important, resulting in feeling overwhelmed, and then cope by retreating to 4th quadrant, not important and not urgent activities; and 2) distractions typical in 3rd quadrant that lead to stress.                                Wednesday                                                                     Group Date: 12/11/2019   Group Attendance Attended group session   Group Therapy Type  Addiction; Psychoeducation; Life Skills   Group Topic Covered Treatment Plan Goals & Assignments; Life-management Skills, Meditation; Mindfulness; MI   Client's Response To Group Topic  Discussed personal experience;    Client Group Participation Detail  Engaged; tengential thinking; sleepiness   Group Attendance (Time)  3.0 Hours   Individual Attendance  None   Family Attendance  None   Group session summary: Group members participated in brief check-in process, and each reported feeling distracted and restless. Counselor led 10-minute meditation exercise; clients reported feeling more grounded, and more able to focus. Each group member presented one treatment plan assignment, particularly sharing what he/she found most useful and intended to apply to recovery process. Reviewed Treatment Plans, as clients reported not understanding structure and strategies (interventions/assignments). Counselor presented information on time-management strategies, including practice for scheduling personal tasks. Each client was provided a one-page calendar for current week, and using colored markers, filled in daily tasks, including sleep, personal hygiene, meals, work, treatment sessions, sober support meetings, and personal time. Next, each client encouraged to select 1-2 Treatment Plan assignments and schedule time on calendar to complete before next session on 12/16/2019. The session closed with each client sharing one positive gain experienced with new recovery and  "one recovery activity planned for weekend.   Shawn shared treatment plan assignment on \"Intention vs. Reality,\" sharing that he felt it helped him further understand Step One experience of powerlessness. Client reported that reviewing the Treatment Plan again was helpful, and used process to organize papers into categories and increase clarity. Client fully participated in self-management strategy using calendar for the week to schedule tasks; client shared that filling in sleep and work schedule helped him see both time spent without focus or intention, and how night work schedule increased risk of negative behavior patterns.      Total # of Phase 1 Group Sessions: 24     Total # of Phase 2 Group Sessions:   Total # of Phase 3 Group Sessions:   Total # of 1:1 Sessions: 6  Support group attended this week: no  Reporting sobriety: Yes; Reports use episode - Current last date of use: 1 12/01/2019.  Treatment Plan Review     Treatment Plan Review completed on:  12/09/2019   Projected discharge date: 2-    Client preferred learning style: Visual  Hands on  Verbal    Staff member(s) contributing: Lucille Castellanos MFA, MA, [facility]  Received supervision: Yes (explain) - Case consultation/staffing with Juany Nicole Clinical Manager..  Client involvement with treatment planning: contributed to goals and plan.  Client received copy of plan/revised plan: Yes  Client agrees with plan/revised plan: Yes  Changes to Treatment Plan: No    Treatment Plan Goals and Current Focus     Client's Dimension 1 Goal(s) 1.Develop effective strategies to maintain sobriety.  Yes - Client completed treatment plan assignments Intent vs. Reality.   Current   Client's Dimension 2 Goal(s) 1. Obtain a physical evaluation.  2. Establish and maintain healthy sleep routine. Current   Client's Dimension 3 Goal(s) 1. Client will learn and utilize coping skills learned in the program to manage symptoms of depression and anxiety more effectively, " "reducing his PHQ9 score  from 10 <6.   Current   Client's Dimension 4 Goal(s) 1. Increase awareness with how substance use is conflicted with personal values and address any ambivalence to change.  Yes - client completed assignment on \"Reservations,\" addressing issues of ambivalence for maintaining sobriety. Current   Client's Dimension 5 Goal(s) 1. Increase awareness of the disease concept of addiction and insight into personal relapse process, triggers and strategies to address.  Yes - client met with counselor to review list of alberto factors to be addressed to minimize feelings of overwhelm and consequential isolation.  2. Establish and maintain daily routines and structure for healthy recovery lifestyle.  Yes - client completing assignments on self-management skills.   Current   Client's Dimension 6 Goal(s) 1. Expand sober support network and be involved in sober activities.  2. Seek sober living housing that provides more dynamic support.   Yes - client set timeline for researching housing and goal for move by January 2020.  3. Identify goals for healthy recovery lifestyle.   Yes - client identified interest in enrolling in college courses, noting he has workplace benefit for free tuition. Current     New Goals added since last review: Yes - Client met with counselor to review new goals. Goals will be added to Treatment Plan after further review on 12/09/2019..  Goal(s) worked on since last review: See Goals addressed in above chart. Client currently working on assignment \"One Year of Healthy Recovery.\"  Strategies effective: Yes  Treatment Coordination Activities: None.    Medical, Mental Health and other appointments the client attended: Yes - Client reports continuing consultation with health care and mental health providers..  Medication issues: None. Medications: Naltrexone (DEPADE/REVIA) 50 MG tablet, sertraline (ZOLOFT) 100 MG tablets  Physical and mental health problems: MH Diagnosis, as listed.     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 Client reports taking Naltrexone, as prescribed..     Dimension 2 0 Client denies current medical concerns.  Client reports intention to focus on increasing sleep time and consistency for self-care and building more consistent daily structure to minimize risks. He rates overall health at 7/10.     Dimension 3 2 Client denies SI, Intent, or thoughts of self-harm to self or others. Client rates current symptoms on scale of 0-10, including: sleeplessness (0), fatigue (6), irritability (5), difficulty concentrating (0), sadness (6), and hopelessness (6). Client reports need to address co-dependent relationship and link to cross-addictions; client processing issues with psychologist.  Client reports next appointment with psychologist on 12/27/2019.    Dimension 4 2 Client rates current motivation for sobriety at 8/10. Client reports his main motivation for sobriety is \"wanting a better life.\" Client reports primary blocks to his motivation include time-management and feeling tired. He states coping strategies most helpful to his recovery have been attending groups and appointments.     Dimension 5 3 Client rates current cravings at 1/10; he notes that \"feeling successful in recovery\" most helped minimize intensity. He notes most significant triggers are work stress and loneliness. Although client rates his motivation for sobriety at 10/10, he rates his commitment to his recovery in the past week at 5/10, rating the " "following actions for recovery: attendance at group sessions (5); sober support meetings (0); daily recovery/meditation readings (7); daily 10th Step practice (7); managing daily schedule (8); self-awareness of emotions and triggers (8); treatment plan assignments (3), meeting with [facility] counselor (10); client adds \"taking meds\" as additional recovery action, rating at 10/10. Client reports that in next week he intends to get tires for his car and attend sober support meeting.  Dimension 6 2 Client rates current living situation at 5, with 1 being very helpful and 10 being very stressful; he reports that he feels thankful to have a place to live, but that his roommates \"seem stuck.\" Client reports setting goal to get information on other sober living opportunities, and plan move by January 2020.    Review and evaluation of the individual abuse prevention plan: The program's individual abuse prevention plan (IAPP) is sufficient for this client.     Data: (Need to include short narrative for the week on what was worked on)  offered feedback good insight client did actively participate   Client reported that he is finding self-management strategies, including specific tools for time-management and problem-solving, as very helpful.     Intervention:   Counselor employed Cognitive Behavioral Therapy (CBT), Motivational Interviewing (MI), client-centered positive regard, and Twelve-Step Facilitation. Self-management Strategies and Skills.    Assessment:    Stages of Change Model  Contemplation    Appears/Sounds:  Motivated  Engaged   Provided information and initial model for time management strategy re: 4 Quadrants from [employer] Time Management systems. Reviewed Quadrant I re: Urgent and Important, and likely consequences when most tasks are defined and/or felt as both urgent and important. Client carefully reviewed general description of each quadrant and noted that he puts nearly all tasks in Quadrant I, becomes " overwhelmed, and then to cope with overwhelm, shifts to Quadrant IV: not important and not urgent, spending hours on video games and then onto computer, online gambling, and sequence of behaviors continues, ending in complete isolation, feelings of shame and depression. Client presents with good insights into his feelings and behaviors. Client presents with strong analysis and critical thinking skills; he states he has had no previous experience with strategies for time management and manageable goal setting. Client requested additional information and resources.    Plan:   - Client will continue to attend all group sessions, meet with counselor, and complete treatment plan assignments.  - Client will seek more dynamic sober housing and follow established timeline for moving.  - Client will follow-through on list of priorities for completing major tasks.  - Client will identify and implement time-management strategies to increase personal accountability.  - Client will attend 1-2 sober support meetings per week.   - Client will follow all recommendations from mental health and medical providers.    Lucille Castellanos MFA, MA, [facility]  December 11, 2019

## 2019-12-10 NOTE — PROGRESS NOTES
Case consultation:  D)  Counselor presented ct's case for review.  DOC alcohol.  Ct also has a gambling addiction and sexual compulsivity.  Ct's attendance became inconsistent and he was put on a tx contract.  Ct continues to work on his tx goals.  P)  [facility] therapists for consultation and discharge planning.    JOSESITO Morocho, LICSW, [facility]  Clinical

## 2019-12-11 ENCOUNTER — HOSPITAL ENCOUNTER (OUTPATIENT)
Dept: BEHAVIORAL HEALTH | Facility: CLINIC | Age: 39
End: 2019-12-11
Attending: SOCIAL WORKER
Payer: COMMERCIAL

## 2019-12-11 PROCEDURE — H2035 A/D TX PROGRAM, PER HOUR: HCPCS | Mod: HQ

## 2019-12-12 ENCOUNTER — OFFICE VISIT (OUTPATIENT)
Dept: OTHER | Facility: OUTPATIENT CENTER | Age: 39
End: 2019-12-12
Payer: COMMERCIAL

## 2019-12-12 DIAGNOSIS — F43.10 POSTTRAUMATIC STRESS DISORDER: ICD-10-CM

## 2019-12-12 DIAGNOSIS — F33.0 MAJOR DEPRESSIVE DISORDER, RECURRENT EPISODE, MILD (H): ICD-10-CM

## 2019-12-12 DIAGNOSIS — F41.1 GENERALIZED ANXIETY DISORDER: ICD-10-CM

## 2019-12-12 DIAGNOSIS — F91.8 OTHER CONDUCT DISORDERS: Primary | ICD-10-CM

## 2019-12-12 DIAGNOSIS — F10.21 ALCOHOL USE DISORDER, MODERATE, IN EARLY REMISSION, DEPENDENCE (H): ICD-10-CM

## 2019-12-13 NOTE — PROGRESS NOTES
[facility]  Group Progress Note     Date of Service: 12/12/19  Client Name: Shawn Camejo  YOB: 1980  MRN:  [id number]  Treating Provider: Venita Lares, PhD and Shireen Pratt, PhD, Postdoctoral Fellow  Type of Session: Group  Number of Minutes: 120 min              Health Maintenance Summary - Mental Health Treatment Plan        Status Date       MENTAL HEALTH TX PLAN Next Due 8/11/2020         Done 9/11/2019 [facility] MENTAL HEALTH TX PLAN SCAN       Done 9/28/2018            Current Symptoms/Status:     Compulsive Sexual Behavior: Client was able to stay within his sexual boundaries. He struggled this past week with long masturbation times using pornographic content and viewing escorts ads.       Depression: The current status is permanent moderate depressive state with isolating behaviors. He endorses the following depression symptoms: Depressed/Hopeless and ? Self-Esteem.     Anxiety: Client reported significant less anxiety compared to four previous week. He endorses the following anxiety symptoms: Worrying too much.     Alcohol Use Disorder: Client was able to stay within his boundaries.     Posttraumatic Stress Disorder: Client reported thoughts and feelings about his past.     Progress Toward Treatment Goals:   Client continues to show interest and commitment towards receiving services at [facility].  Client joined CSB group 4/20/19. Remains motivated to continue with group sessions.  He is in an AA treatment group at [facility] (attended 2/2 meetings).    Intervention: Modality and Description:  Utilized supportive and CBT techniques were used to address CSB and related mental health issues. The focus of today's session was to following his progress and proposing new goals/assignments.    Response to Intervention:  He said he is feeling much better compared to the past 3 weeks, but dealing with pornography is still a big challenge. Getting social  support is a problem for him and he has been trying to get it from the people in CSB group. He realized that he would need to be more aware of the feeling of loneliness because this condition will not change in the near future. His bad memories of his past is haunting him again and he agreed to starting work on that into the next sessions.    Assignment:  - Continue attending CSB and AA group (permanent assignment).  - Updating and setting his sexual, alcohol, gabling boundaries (progressing assignment).  - Working on Family enneagram (new assignment).     Interactive Complexity:  N/A      Diagnosis:  312.89 (F91.8) Other Specified Disruptive, Impulsive-Control, and Conduct Disorder (hypersexuality)   296.31 (F33.0)  Major Depressive Disorder, mild, recurrent   300.02 (F41.1)  Generalized Anxiety Disorder   309.81 (F43.10) Posttraumatic Stress Disorder   303.90 (F10.20) Alcohol Use Disorder      Plan / Need for Future Services:  Return for individual therapy every week to address CSB. Continue weekly group therapy.     hSireen Pratt, PhD, Postdoctoral Fellow

## 2019-12-16 ENCOUNTER — HOSPITAL ENCOUNTER (OUTPATIENT)
Dept: BEHAVIORAL HEALTH | Facility: CLINIC | Age: 39
End: 2019-12-16
Attending: SOCIAL WORKER
Payer: COMMERCIAL

## 2019-12-16 PROCEDURE — H2035 A/D TX PROGRAM, PER HOUR: HCPCS | Mod: HQ

## 2019-12-16 NOTE — PROGRESS NOTES
Shawn Camejo  [id number]               Adult CD Progress Note and Treatment Plan Review     Attendance                                  Monday                                                                     Group Date: 12/16/2019   Group Attendance Attended group session   Group Therapy Type Addiction; Psychoeducation; Life Skills   Group Topic Covered Motivation; Life-Management Skills; Affirmations; Mindfulness   Client's Response To Group Topic Discussed personal experience with topic; demonstrated willingness to apply information    Client Group Participation Detail Highly involved   Group Attendance (Time) 3.0 Hours   Individual Attendance None   Family Attendance None   Group session summary: This group session opened with a check-in process: clients provided updates on current status, including risks, stressors, and challenges to recovery. Client shared strategies used to help manage, and insights gained. Each week, clients complete a weekly self-evaluation on personal motivation and commitment for treatment and recovery, rating recovery activities on scale of 1-10, including: attending group sessions, sober support meetings, daily recovery readings, daily 10th Step practice, work on treatment plan assignments, managing daily schedules, and meetings with counselors.   The group next focused on topic of Affirmations, including Life-draining/Shaming messages and selection of new Life-Giving, Affirming messages. Clients wrote down 4-5 negative messages they frequently tell themselves, and created alternative, affirming statements. Each client shared reflections with the group. Discussion focused on risks of dwelling on negative messages, including risks of continued victimized or self-pitying thoughts that are likely to sabotage recovery.   Clients were also provided additional information on self-management strategies, including more exploration of the Four Quadrants time-management evaluation. Clients  "were provided with blank forms and asked to record activities in each quadrant and bring to the next group to share reflections.    Shawn stated that he felt confusion about identifying feelings and not avoiding, as compared with identifying negative feelings and countering with positive affirmations and choosing an alternative behavior to avoid dwelling on negative thoughts, shaming thoughts. He recognized that he can take small steps to identify and manage feelings, and keep focus on recovery.                               Wednesday                                                                     Group Date: 12/18/2019   Group Attendance Attended group session   Group Therapy Type  Addiction; Psychoeducation; Life Skills   Group Topic Covered 12 Step Principles of Spirituality; Obstacles to Recovery; Magical Thinking; CBT   Client's Response To Group Topic  Discussed personal experience;    Client Group Participation Detail  Engaged; Shared insights gained   Group Attendance (Time)  3.0 Hours   Individual Attendance  None   Family Attendance  None   Group session summary: Group members completed Ana Laura's \"Stabilization Checklist\" designed to evaluate current abstinence and stability of abstinence. Clients shared results, including: acceptance of addiction, intentions for abstinence, symptoms of PAWS, and addictive preoccupation. Group members noted most significant challenge continues to be addictive preoccupation, and discussed risks and strategies to help manage. Group was provided a brief presentation on 12 Step Principles of Spirituality, including model for identifying these principles as one s Higher Power, and 12 Step recovery a model for self-actualization. Clients were asked to one  gift  they would most like for future in recovery IF there was a magical Fairy Godmother who would rosana their wish. Group processed attitudes of passivity in treatment and recovery vs. commitment to action. Each " completed a quick-write with list of 5 items responding to the statement:  If I take responsibility for my recovery, I will   Clients each shared reflections on specific action steps identified for group closing.         Total # of Phase 1 Group Sessions: 26     Total # of Phase 2 Group Sessions:   Total # of Phase 3 Group Sessions:   Total # of 1:1 Sessions: 6  Support group attended this week: no  Reporting sobriety: Yes; Reports use episode - Current last date of use: 1 12/01/2019. Client was administered UA on 12/18/2019; initial results indicated positive for Benzodiazepines; Qualitative Analysis results from lab reported Negative for all substances.   Treatment Plan Review     Treatment Plan Review completed on:  12/16/2019   Projected discharge date: 2-    Client preferred learning style: Visual  Hands on  Verbal    Staff member(s) contributing: Lucille Castellanos MFA, MA, [facility]  Received supervision: Yes (explain) - Case consultation/staffing with Juany Nicole Clinical Manager..  Client involvement with treatment planning: contributed to goals and plan.  Client received copy of plan/revised plan: Yes  Client agrees with plan/revised plan: Yes  Changes to Treatment Plan: No    Treatment Plan Goals and Current Focus     Client's Dimension 1 Goal(s) 1.Develop effective strategies to maintain sobriety.  Yes - Client completed treatment plan assignments Intent vs. Reality.   Current   Client's Dimension 2 Goal(s) 1. Obtain a physical evaluation.  2. Establish and maintain healthy sleep routine. Current   Client's Dimension 3 Goal(s) 1. Client will learn and utilize coping skills learned in the program to manage symptoms of depression and anxiety more effectively, reducing his PHQ9 score  from 10 <6.   Current   Client's Dimension 4 Goal(s) 1. Increase awareness with how substance use is conflicted with personal values and address any ambivalence to change.  Yes - client completed assignment on  "\"Reservations,\" addressing issues of ambivalence for maintaining sobriety. Current   Client's Dimension 5 Goal(s) 1. Increase awareness of the disease concept of addiction and insight into personal relapse process, triggers and strategies to address.  Yes - client met with counselor to review list of alberto factors to be addressed to minimize feelings of overwhelm and consequential isolation.  2. Establish and maintain daily routines and structure for healthy recovery lifestyle.  Yes - client completing assignments on self-management skills.   Current   Client's Dimension 6 Goal(s) 1. Expand sober support network and be involved in sober activities.  2. Seek sober living housing that provides more dynamic support.   Yes - client set timeline for researching housing and goal for move by January 2020.  3. Identify goals for healthy recovery lifestyle.   Yes - client identified interest in enrolling in college courses, noting he has workplace benefit for free tuition. Current     New Goals added since last review: None.  Goal(s) worked on since last review: See Goals addressed in above chart. Client currently working on assignment \"Instent vs. Reality.\"   Strategies effective: Yes  Treatment Coordination Activities: None.    Medical, Mental Health and other appointments the client attended: Yes - Client reports continuing consultation with health care and mental health providers..  Medication issues: None. Medications: Naltrexone (DEPADE/REVIA) 50 MG tablet, sertraline (ZOLOFT) 100 MG tablets  Physical and mental health problems:  Diagnosis, as listed.      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 Client reports taking Naltrexone, as prescribed.. Client was administered UA on 12/18/2019; Qualitative Analysis results report Negative for all substances. Client denies current symptoms of withdrawal.     Dimension 2 0 Client denies current medical concerns.  Client reports intention to focus on increasing sleep time and consistency for self-care and building more consistent daily structure to minimize risks. He rates overall health at 6/10.     Dimension 3 2 Client denies SI, Intent, or thoughts of self-harm to self or others. Client rates current symptoms on scale of 0-10, including: sleeplessness (5), fatigue (6), irritability (5), difficulty concentrating (5), sadness (4), and hopelessness (4). Client reports need to address co-dependent relationship and link to cross-addictions; client processing issues with psychologist.  Client reports next appointment with psychologist on 12/27/2019. Client is presenting with decreased motivation, significantly less positive. Client reports Sertraline was increased.    Dimension 4 2 Client rates current motivation for sobriety at 8/10. Client reports his main motivation for sobriety is \"wanting a better life.\" Client reports blocks to his motivation are \"avoidance, procrastination, and depression.\"    Dimension 5 3 Client rates current cravings at 5/10, significantly increased from last week's rating at 1/10.    Although client rates his motivation for sobriety at 10/10, he rates his commitment to his recovery in the past week at 5/10, rating the following actions for recovery: attendance at group sessions (0); sober support meetings (0); daily recovery/meditation readings (0); daily 10th Step practice (0); managing daily schedule (10); self-awareness of emotions and triggers (8); treatment plan assignments (0), meeting with " [place] counselor (0). Client rates his current commitment to recovery at 3/10. Client was frequently negative during group sessions, including initiating side-conversations, discrediting comments, and significant excuse-making.  Dimension 6 2 Client rates current living situation at 5, with 1 being very helpful and 10 being very stressful.    Review and evaluation of the individual abuse prevention plan: The program's individual abuse prevention plan (IAPP) is sufficient for this client.     Data: (Need to include short narrative for the week on what was worked on)  offered feedback good insight client did actively participate   Client was frequently negative during group sessions, including initiating side-conversations, discrediting comments, and significant excuse-making.    Intervention:   Counselor employed Cognitive Behavioral Therapy (CBT), Motivational Interviewing (MI), client-centered positive regard, and Twelve-Step Facilitation. Self-management Strategies and Skills.    Assessment:    Stages of Change Model  Contemplation    Appears/Sounds:  Motivated  Engaged   Plan:   - Client will continue to attend all group sessions, meet with counselor, and complete treatment plan assignments.  - Client will seek more dynamic sober housing and follow established timeline for moving.  - Client will follow-through on list of priorities for completing major tasks.  - Client will identify and implement time-management strategies to increase personal accountability.  - Client will attend 1-2 sober support meetings per week.   - Client will follow all recommendations from mental health and medical providers.    Lucille Castellanos MFA, MA, [facility][note 3]  December 18, 2019

## 2019-12-18 ENCOUNTER — HOSPITAL ENCOUNTER (OUTPATIENT)
Dept: BEHAVIORAL HEALTH | Facility: CLINIC | Age: 39
End: 2019-12-18
Attending: SOCIAL WORKER
Payer: COMMERCIAL

## 2019-12-18 LAB
BENZODIAZ UR QL: NEGATIVE
CANNABINOIDS UR QL SCN: NEGATIVE
ETHANOL UR QL SCN: NEGATIVE

## 2019-12-18 PROCEDURE — H2035 A/D TX PROGRAM, PER HOUR: HCPCS | Mod: HQ

## 2019-12-18 PROCEDURE — 80320 DRUG SCREEN QUANTALCOHOLS: CPT

## 2019-12-18 PROCEDURE — 80307 DRUG TEST PRSMV CHEM ANLYZR: CPT

## 2019-12-19 ENCOUNTER — OFFICE VISIT (OUTPATIENT)
Dept: OTHER | Facility: OUTPATIENT CENTER | Age: 39
End: 2019-12-19
Payer: COMMERCIAL

## 2019-12-19 DIAGNOSIS — F43.10 POSTTRAUMATIC STRESS DISORDER: ICD-10-CM

## 2019-12-19 DIAGNOSIS — F41.1 GENERALIZED ANXIETY DISORDER: ICD-10-CM

## 2019-12-19 DIAGNOSIS — F91.8 OTHER CONDUCT DISORDERS: Primary | ICD-10-CM

## 2019-12-19 DIAGNOSIS — F10.21 ALCOHOL USE DISORDER, MODERATE, IN EARLY REMISSION, DEPENDENCE (H): ICD-10-CM

## 2019-12-19 DIAGNOSIS — F33.0 MAJOR DEPRESSIVE DISORDER, RECURRENT EPISODE, MILD (H): ICD-10-CM

## 2019-12-20 NOTE — PROGRESS NOTES
[facility]  Group Progress Note     Date of Service: 12/19/19  Client Name: Shawn Camejo  YOB: 1980  MRN:  [id number]  Treating Provider: Venita Lares, PhD and Shireen Pratt, PhD, Postdoctoral Fellow  Type of Session: Group  Number of Minutes: 120 min              Health Maintenance Summary - Mental Health Treatment Plan        Status Date       MENTAL HEALTH TX PLAN Next Due 8/11/2020         Done 9/11/2019 [facility] MENTAL HEALTH TX PLAN SCAN       Done 9/28/2018            Current Symptoms/Status:     Compulsive Sexual Behavior: Client was unable to stay within his sexual boundaries. He struggled this past week with long masturbation times using pornographic content and he went to [facility] twice.    Alcohol Use Disorder: Client was able to stay within his boundaries. However, he sipped some alcohol at [facility] and [facility].     Posttraumatic Stress Disorder: Not being diagnosed today.    Depression: Not being diagnosed today     Anxiety: Not being diagnosed today.    Progress Toward Treatment Goals:   Client continues to show interest and commitment towards receiving services at [facility].  Client joined CSB group 4/20/19. Remains motivated to continue with group sessions.  He is in an AA treatment group at [facility] (attended 3/3 meetings).  Client completed one of his assignments regarding working on Family enneagram.    Intervention: Modality and Description:  Utilized supportive and CBT techniques were used to address CSB and related mental health issues. The focus of today's session was on getting insights and understating on is Family enneagram.    Response to Intervention:  Client is aware that he has had risky behaviors regarding alcohol use, compulsive sexual behavior and gambling. He has been making an effort to better his coping. The group reinforced him as he did one of his assignments. About this assignment, he was able to perceive a  history of addictions, self-judgments, emotional abuses, reckless behaviors, and lack of social support.  He still cannot connect how that has been influencing his current reality. He does not how to deal with his bad memories from his past.    Assignment:  - Continue attending CSB and AA group (permanent assignment).  - Updating and setting his sexual, alcohol, gabling boundaries (pending assignment).  - Working on Family enneagram (progressing assignment).     Interactive Complexity:  N/A      Diagnosis:  312.89 (F91.8) Other Specified Disruptive, Impulsive-Control, and Conduct Disorder (hypersexuality)   296.31 (F33.0)  Major Depressive Disorder, mild, recurrent   300.02 (F41.1)  Generalized Anxiety Disorder   309.81 (F43.10) Posttraumatic Stress Disorder   303.90 (F10.20) Alcohol Use Disorder      Plan / Need for Future Services:  Return for individual therapy every week to address CSB. Continue weekly group therapy.     Shireen Pratt, PhD, Postdoctoral Fellow

## 2019-12-23 ENCOUNTER — HOSPITAL ENCOUNTER (OUTPATIENT)
Dept: BEHAVIORAL HEALTH | Facility: CLINIC | Age: 39
End: 2019-12-23
Attending: SOCIAL WORKER
Payer: COMMERCIAL

## 2019-12-23 PROCEDURE — H2035 A/D TX PROGRAM, PER HOUR: HCPCS | Mod: HQ

## 2019-12-30 ENCOUNTER — HOSPITAL ENCOUNTER (OUTPATIENT)
Dept: BEHAVIORAL HEALTH | Facility: CLINIC | Age: 39
End: 2019-12-30
Attending: SOCIAL WORKER
Payer: COMMERCIAL

## 2019-12-30 PROCEDURE — H2035 A/D TX PROGRAM, PER HOUR: HCPCS | Mod: HQ

## 2019-12-30 NOTE — ADDENDUM NOTE
Encounter addended by: Lucille Castellanos LADC on: 12/30/2019 4:08 PM   Actions taken: Episode edited, Flowsheet accepted

## 2019-12-30 NOTE — PROGRESS NOTES
Shawn Camejo  [id number]               Adult CD Progress Note and Treatment Plan Review     Attendance                                    Monday                                                                     Group Date: 12/30/2019   Group Attendance Attended group session   Group Therapy Type Addiction; Psychoeducation   Group Topic Covered Strategies to manage holidays/celebrations; Disease of Addiction   Client's Response To Group Topic Highly Engaged   Client Group Participation Detail Client shared personal experiences; demonstrated insight   Group Attendance (Time) 3.0 hours   Individual Attendance None   Family Attendance None   Group Session Summary: Each group member participated in check-in process, sharing reflections and feelings about holidays, including particularly stressors and strategies used to help manage. Clients presented with introductory information about physical and cognitive impact of Cannabis and Alcohol, including continuing effects in early recovery. Clients shared experiences with recent and current symptoms. Reviewed continuum of changes in first year of recovery, and several strategies to help strengthen cognitive repair.                                     Tuesday                                                                     Group Date: 12/31/2019   Group Attendance Attended group session   Group Therapy Type Addiction; Psychoeducation   Group Topic Covered Recovery Attitudes; Values Clarification; Setting Intentions   Client's Response To Group Topic Highly Engaged   Client Group Participation Detail Client shared personal experiences; demonstrated insight   Group Attendance (Time) 3.0 hours   Individual Attendance None   Family Attendance None   Group Session Summary: Each group member participated in check-in process, providing information on plans for New Year's holiday and strategies to manage stressors. Each member shared reflections on personal focus on a  Recovery Attitude. Then, the group completed a Values Clarification process, then shared top 5 personal values and intended attitudes & actions to strengthen key values in the new year. Clients also participated in group process on identifying one negative attitude/action most want to remove; each wrote specific item on card, shared with group, and discarded. Clients then identified one phrase, thought, or action would practice to help replace previous negative action, and shared with group. Clients shared plans to attend sober support meetings over the holiday. Ramon shared that he is letting go of anxiety about procrastination, and will increase focus on actions to stop pattern of isolation.       Total # of Phase 1 Group Sessions: 29     Total # of Phase 2 Group Sessions:   Total # of Phase 3 Group Sessions:   Total # of 1:1 Sessions: 6  Support group attended this week: no  Reporting sobriety: Yes; Reports use episode - Current last date of use: 1 12/01/2019. Client was administered UA on 12/18/2019; initial results indicated positive for Benzodiazepines; Qualitative Analysis results from lab reported Negative for all substances.   Treatment Plan Review     Treatment Plan Review completed on:  12/30/2019   Projected discharge date: 2-    Client preferred learning style: Visual  Hands on  Verbal    Staff member(s) contributing: Lucille Castellanos MFA, MA, [facility]  Received supervision: Yes (explain) - Case consultation/staffing with Juany Nicole, Clinical Manager..  Client involvement with treatment planning: contributed to goals and plan.  Client received copy of plan/revised plan: Yes  Client agrees with plan/revised plan: Yes  Changes to Treatment Plan: No    Treatment Plan Goals and Current Focus     Client's Dimension 1 Goal(s) 1.Develop effective strategies to maintain sobriety.  Yes - Client completed treatment plan assignments Intent vs. Reality.   Current   Client's Dimension 2 Goal(s) 1. Obtain a  "physical evaluation.  2. Establish and maintain healthy sleep routine. Current   Client's Dimension 3 Goal(s) 1. Client will learn and utilize coping skills learned in the program to manage symptoms of depression and anxiety more effectively, reducing his PHQ9 score  from 10 <6.   Current   Client's Dimension 4 Goal(s) 1. Increase awareness with how substance use is conflicted with personal values and address any ambivalence to change.  Yes - client completed assignment on \"Reservations,\" addressing issues of ambivalence for maintaining sobriety. Current   Client's Dimension 5 Goal(s) 1. Increase awareness of the disease concept of addiction and insight into personal relapse process, triggers and strategies to address.  Yes - client met with counselor to review list of alberto factors to be addressed to minimize feelings of overwhelm and consequential isolation.  2. Establish and maintain daily routines and structure for healthy recovery lifestyle.  Yes - client completing assignments on self-management skills.   Current   Client's Dimension 6 Goal(s) 1. Expand sober support network and be involved in sober activities.  2. Seek sober living housing that provides more dynamic support.   Yes - client set timeline for researching housing and goal for move by January 2020.  3. Identify goals for healthy recovery lifestyle.   Yes - client identified interest in enrolling in college courses, noting he has workplace benefit for free tuition. Current     New Goals added since last review: None.  Goal(s) worked on since last review: See Goals addressed in above chart. Client currently working on assignment \"Instent vs. Reality.\"   Strategies effective: Yes  Treatment Coordination Activities: None.    Medical, Mental Health and other appointments the client attended: Yes - Client reports continuing consultation with health care and mental health providers..  Medication issues: None. Medications: Naltrexone (DEPADE/REVIA) 50 MG " "tablet, sertraline (ZOLOFT) 100 MG tablets  Physical and mental health problems:  Diagnosis, as listed.      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 Client reports taking Naltrexone, as prescribed.. Client was administered UA on 12/18/2019; Qualitative Analysis results report Negative for all substances. Client denies current symptoms of withdrawal.     Dimension 2 0 Client denies current medical concerns.  Client reports intention to focus on increasing sleep time and consistency for self-care and building more consistent daily structure to minimize risks. He rates overall health at 6/10.     Dimension 3 2 Client denies SI, Intent, or thoughts of self-harm to self or others. Client rates current symptoms on scale of 0-10, including: sleeplessness (5), fatigue (6), irritability (5), difficulty concentrating (5), sadness (4), and hopelessness (4). Client reports need to address co-dependent relationship and link to cross-addictions; client processing issues with psychologist.  Client reports next appointment with psychologist on 12/27/2019. Client is presenting with decreased motivation, significantly less positive. Client reports Sertraline was increased.    Dimension 4 2 Client rates current motivation for sobriety at 8/10. Client reports his main motivation for sobriety is \"wanting a better life.\" Client reports blocks to his motivation are \"avoidance, procrastination, and depression.\"    Dimension 5 3 Client rates current cravings at 5/10, " significantly increased from last week's rating at 1/10.    Although client rates his motivation for sobriety at 10/10, he rates his commitment to his recovery in the past week at 5/10, rating the following actions for recovery: attendance at group sessions (0); sober support meetings (0); daily recovery/meditation readings (0); daily 10th Step practice (0); managing daily schedule (10); self-awareness of emotions and triggers (8); treatment plan assignments (0), meeting with IOP counselor (0). Client rates his current commitment to recovery at 3/10. Client was frequently negative during group sessions, including initiating side-conversations, discrediting comments, and significant excuse-making.  Dimension 6 2 Client rates current living situation at 5, with 1 being very helpful and 10 being very stressful.    Review and evaluation of the individual abuse prevention plan: The program's individual abuse prevention plan (IAPP) is sufficient for this client.     Data: (Need to include short narrative for the week on what was worked on)  offered feedback good insight client did actively participate   Client was frequently negative during group sessions, including initiating side-conversations, discrediting comments, and significant excuse-making.    Intervention:   Counselor employed Cognitive Behavioral Therapy (CBT), Motivational Interviewing (MI), client-centered positive regard, and Twelve-Step Facilitation. Self-management Strategies and Skills.    Assessment:    Stages of Change Model  Contemplation    Appears/Sounds:  Motivated  Engaged   Plan:   - Client will continue to attend all group sessions, meet with counselor, and complete treatment plan assignments.  - Client will seek more dynamic sober housing and follow established timeline for moving.  - Client will follow-through on list of priorities for completing major tasks.  - Client will identify and implement time-management strategies to increase personal  accountability.  - Client will attend 1-2 sober support meetings per week.   - Client will follow all recommendations from mental health and medical providers.    Lucille Castellanos MFA, MA, LADC  December 30, 2019

## 2019-12-30 NOTE — PROGRESS NOTES
Shawn Camejo  [id number]               Adult CD Progress Note and Treatment Plan Review     Attendance       Monday    Group Date: 12/23/2019   Group Attendance Attended group session   Group Therapy Type Addiction; Psychoeducation   Group Topic Covered Strategies to manage holidays/celebrations; Motivation for Recovery   Client's Response To Group Topic Highly Engaged   Client Group Participation Detail Client shared personal experiences; demonstrated insight   Group Attendance (Time) 3.0 hours   Individual Attendance None   Family Attendance None   Group Session Summary: Group session focused on plans for [place] holidays, including strategies to manage triggers/risks of using; emotions such as loneliness, frustration, and resentments; and planning for alternative activities, including attending meetings, connecting with peers in recovery, and setting new traditions. Client agreed to attend one sober support meeting each day until next group session scheduled for 12/30/2019, and to report his attendance to group peer.     Total # of Phase 1 Group Sessions: 27     Total # of Phase 2 Group Sessions:   Total # of Phase 3 Group Sessions:   Total # of 1:1 Sessions: 6  Support group attended this week: no  Reporting sobriety: Yes; Reports use episode - Current last date of use: 1 12/01/2019. Client was administered UA on 12/18/2019; initial results indicated positive for Benzodiazepines; Qualitative Analysis results from lab reported Negative for all substances.   Treatment Plan Review     Treatment Plan Review completed on:  12/23/2019   Projected discharge date: 2-    Client preferred learning style: Visual  Hands on  Verbal    Staff member(s) contributing: Lucille Castellanos MFA, MA, [facility]  Received supervision: Yes (explain) - Case consultation/staffing with Juany Nicole, Clinical Manager..  Client involvement with treatment planning: contributed to goals and plan.  Client received copy of plan/revised  "plan: Yes  Client agrees with plan/revised plan: Yes  Changes to Treatment Plan: No    Treatment Plan Goals and Current Focus     Client's Dimension 1 Goal(s) 1.Develop effective strategies to maintain sobriety.  Yes - Client completed treatment plan assignments Intent vs. Reality.   Current   Client's Dimension 2 Goal(s) 1. Obtain a physical evaluation.  2. Establish and maintain healthy sleep routine. Current   Client's Dimension 3 Goal(s) 1. Client will learn and utilize coping skills learned in the program to manage symptoms of depression and anxiety more effectively, reducing his PHQ9 score  from 10 <6.   Current   Client's Dimension 4 Goal(s) 1. Increase awareness with how substance use is conflicted with personal values and address any ambivalence to change.  Yes - client completed assignment on \"Reservations,\" addressing issues of ambivalence for maintaining sobriety. Current   Client's Dimension 5 Goal(s) 1. Increase awareness of the disease concept of addiction and insight into personal relapse process, triggers and strategies to address.  Yes - client met with counselor to review list of alberto factors to be addressed to minimize feelings of overwhelm and consequential isolation.  2. Establish and maintain daily routines and structure for healthy recovery lifestyle.  Yes - client completing assignments on self-management skills.   Current   Client's Dimension 6 Goal(s) 1. Expand sober support network and be involved in sober activities.  2. Seek sober living housing that provides more dynamic support.   Yes - client set timeline for researching housing and goal for move by January 2020.  3. Identify goals for healthy recovery lifestyle.   Yes - client identified interest in enrolling in college courses, noting he has workplace benefit for free tuition. Current     New Goals added since last review: None.  Goal(s) worked on since last review: See Goals addressed in above chart. Client currently working on " "assignment \"Instent vs. Reality.\"   Strategies effective: Yes  Treatment Coordination Activities: None.    Medical, Mental Health and other appointments the client attended: Yes - Client reports continuing consultation with health care and mental health providers..  Medication issues: None. Medications: Naltrexone (DEPADE/REVIA) 50 MG tablet, sertraline (ZOLOFT) 100 MG tablets  Physical and mental health problems:  Diagnosis, as listed.      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 Client reports taking Naltrexone, as prescribed.. Client was administered UA on 12/18/2019; Qualitative Analysis results report Negative for all substances. Client denies current symptoms of withdrawal.     Dimension 2 0 Client denies current medical concerns.  Client reports intention to focus on increasing sleep time and consistency for self-care and building more consistent daily structure to minimize risks. He rates overall health at 6/10.     Dimension 3 2 Client denies SI, Intent, or thoughts of self-harm to self or others. Client rates current symptoms on scale of 0-10, including: sleeplessness (5), fatigue (6), irritability (5), difficulty concentrating (5), sadness (4), and hopelessness (4). Client reports need to address co-dependent relationship and link to cross-addictions; client processing issues with psychologist.  Client reports next appointment with psychologist on 12/27/2019. Client is presenting with decreased motivation, significantly less positive. " "Client reports Sertraline was increased.    Dimension 4 2 Client rates current motivation for sobriety at 8/10. Client reports his main motivation for sobriety is \"wanting a better life.\" Client reports blocks to his motivation are \"avoidance, procrastination, and depression.\"    Dimension 5 3 Client rates current cravings at 5/10, significantly increased from last week's rating at 1/10.    Although client rates his motivation for sobriety at 10/10, he rates his commitment to his recovery in the past week at 5/10, rating the following actions for recovery: attendance at group sessions (0); sober support meetings (0); daily recovery/meditation readings (0); daily 10th Step practice (0); managing daily schedule (10); self-awareness of emotions and triggers (8); treatment plan assignments (0), meeting with IOP counselor (0). Client rates his current commitment to recovery at 3/10. Client was frequently negative during group sessions, including initiating side-conversations, discrediting comments, and significant excuse-making.  Dimension 6 2 Client rates current living situation at 5, with 1 being very helpful and 10 being very stressful.    Review and evaluation of the individual abuse prevention plan: The program's individual abuse prevention plan (IAPP) is sufficient for this client.     Data: (Need to include short narrative for the week on what was worked on)  offered feedback good insight client did actively participate   Client was frequently negative during group sessions, including initiating side-conversations, discrediting comments, and significant excuse-making.    Intervention:   Counselor employed Cognitive Behavioral Therapy (CBT), Motivational Interviewing (MI), client-centered positive regard, and Twelve-Step Facilitation. Self-management Strategies and Skills.    Assessment:    Stages of Change Model  Contemplation    Appears/Sounds:  Motivated  Engaged   Plan:   - Client will continue to attend all " group sessions, meet with counselor, and complete treatment plan assignments.  - Client will seek more dynamic sober housing and follow established timeline for moving.  - Client will follow-through on list of priorities for completing major tasks.  - Client will identify and implement time-management strategies to increase personal accountability.  - Client will attend 1-2 sober support meetings per week.   - Client will follow all recommendations from mental health and medical providers.    Lucille Castellanos MFA, MA, [facility]  December 23, 2019

## 2019-12-31 ENCOUNTER — HOSPITAL ENCOUNTER (OUTPATIENT)
Dept: BEHAVIORAL HEALTH | Facility: CLINIC | Age: 39
End: 2019-12-31
Attending: SOCIAL WORKER
Payer: COMMERCIAL

## 2019-12-31 PROCEDURE — H2035 A/D TX PROGRAM, PER HOUR: HCPCS | Mod: HQ

## 2020-01-02 ENCOUNTER — OFFICE VISIT (OUTPATIENT)
Dept: OTHER | Facility: OUTPATIENT CENTER | Age: 40
End: 2020-01-02
Payer: COMMERCIAL

## 2020-01-02 DIAGNOSIS — F10.21 ALCOHOL USE DISORDER, MODERATE, IN EARLY REMISSION, DEPENDENCE (H): ICD-10-CM

## 2020-01-02 DIAGNOSIS — F41.1 GENERALIZED ANXIETY DISORDER: ICD-10-CM

## 2020-01-02 DIAGNOSIS — F91.8 OTHER CONDUCT DISORDERS: Primary | ICD-10-CM

## 2020-01-02 DIAGNOSIS — F33.0 MAJOR DEPRESSIVE DISORDER, RECURRENT EPISODE, MILD (H): ICD-10-CM

## 2020-01-02 DIAGNOSIS — F43.10 POSTTRAUMATIC STRESS DISORDER: ICD-10-CM

## 2020-01-03 NOTE — PROGRESS NOTES
[facility]  Group Progress Note     Date of Service: 1/02/20  Client Name: Shawn Camejo  YOB: 1980  MRN:  [id number]  Treating Provider: Venita Lares, PhD and Shireen Pratt, PhD, Postdoctoral Fellow  Type of Session: Group  Number of Minutes: 120 min              Health Maintenance Summary - Mental Health Treatment Plan        Status Date       MENTAL HEALTH TX PLAN Next Due 8/11/2020         Done 9/11/2019 [facility] MENTAL HEALTH TX PLAN SCAN       Done 9/28/2018            Current Symptoms/Status:     Compulsive Sexual Behavior: Pattern of engaging in compulsive sexual behaviors causing depression, worry, critical internal messages about his sex. Thoughts and urges to act out sexually on a daily basis. Client was unable to stay within his sexual boundaries. He still has been crossing addictions (alcohol and gambling) with his sexual compulsive behavior.     Depression: The current status is permanent moderate depressive state. He endorses the following depression symptoms: Depressed/Hopeless, Loneliness, ? Self-Esteem/Guilt     Alcohol Use Disorder: Pattern of alcohol use that is excessive, causing anxiety, worry, interferes with social and emotional relationships, thoughts, and urges to drink alcohol. No risk situation was reported.     Anxiety: Not being diagnosed today.    Posttraumatic Stress Disorder: Not being diagnosed today.    Progress Toward Treatment Goals:   Client continues to show interest and commitment towards receiving services at [facility].  Client joined CSB group 4/20/19. Remains motivated to continue with group sessions.  He is in an AA treatment group at [facility] (attended 3/3 meetings).  No side effects have been reported due to sertraline-increased dose.  Client completed one of his assignments regarding updating and setting his sexual, alcohol, gabling boundaries.  Client completed one of his assignments regarding working on  Family enneagram. He needs to end the discussion in a future session.  Continued working on his assignments.  Client was able to stay within his boundaries regarding alcohol.    Intervention: Modality and Description:  Utilized supportive and CBT techniques were used to address CSB and related mental health issues. The focus of today's session was on sharing his negative consequences of crossing his addictions with his compulsive sexual behavior.    Response to Intervention:  Client used his session time to share his new boundaries with the group and get some feedback. He also shared with the group that he has been more aware of the negative consequences and his commitment on how to deal with it weekly. He understood that he would need to work with his lack of structure and with a long-term view, chronically caring for his compulsiveness.    Assignment:  - Continue attending CSB and AA group (permanent assignment).     Interactive Complexity:  N/A      Diagnosis:  312.89 (F91.8) Other Specified Disruptive, Impulsive-Control, and Conduct Disorder (hypersexuality)   296.31 (F33.0)  Major Depressive Disorder, mild, recurrent   300.02 (F41.1)  Generalized Anxiety Disorder   309.81 (F43.10) Posttraumatic Stress Disorder   303.90 (F10.20) Alcohol Use Disorder      Plan / Need for Future Services:  Return for individual therapy every week to address CSB. Continue weekly group therapy.     Shireen Pratt, PhD, Postdoctoral Fellow

## 2020-01-06 ENCOUNTER — HOSPITAL ENCOUNTER (OUTPATIENT)
Dept: BEHAVIORAL HEALTH | Facility: CLINIC | Age: 40
End: 2020-01-06
Attending: SOCIAL WORKER
Payer: COMMERCIAL

## 2020-01-06 PROCEDURE — H2035 A/D TX PROGRAM, PER HOUR: HCPCS | Mod: HQ

## 2020-01-06 NOTE — PROGRESS NOTES
MH Skills Group Progress Note   Shawn Camejo  [id number]     Attendance:    Monday    Group Date: 1/6/2020   Group Attendance Attended group session   Group Therapy Type Addiction, Life skill(s) and Psychoeducation   Group Topic Covered Communication, Co-occurring Illness, Disease of Addiction/Choices in Recovery and Relapse Prevention   Client's Response To Group Topic Cooperative with task Discussed personal experience with topic Expressed understanding of topic Listened actively Offered helpful suggestions to peers   Client Group Participation Detail Highly involved, Shows interest and Adequate participation   Group Attendance (Time) 3.0 Hours   Individual Attendance None   Family Attendance None   Other Comments/Information Clients learned how MH and CD play a roll in thier daily lives and how they both play on recovery (relapse prevention).      DIMENSION 3:  Emotional/Behavioral/Cognitive Conditions and Complications  The degree to which any condition or complications are likely to interfere with treatment for substance abuse or with function in significant life areas and the likelihood of risk of harm to self or others.      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     Latest PHQ-9: Client scored 5/27 and indicated the symptoms are; not difficult at all to their daily living. (01/06)  Latest FERNANDEZ-7: Client scored 6/21 and indicated the symptoms are; not  "difficult at all to their daily living. (01/06)     > Client reports that he sees his psychologist and psychiatirst at [facility] regularily (weekly) as part of the [facility].      > Client reports taking the following medications;  Zoloft and Naltrexone.      > Client reports his risk factors are; \"cross addictions, lack of sober support, isolation, co-occurring disorders, financial & family stressors\".    > Client reports his protective factors are; \"community  providers in place and sees regularly, healthy fear of getting hurt, employment, asks for help when needed and knows how to use safety plan effectively\".      Data: Client rates his attendance level for the week on a scale of 1-10 (10 being the highest level of attendance) at a \"8\". Client reports feeling the following two emotions today: 1. \"frustrated\" and 2. \"stuck.\" Client reported stuggling with \"boredom at work which triggers me\" in the past 24-hours. Client reports using the following coping skill(s) to address the struggle; \"maintaining motivation and drive to get my own place and focusing on the end result\". Client reported being grateful for 1. \"job\" and 2. \"being in recovery\". Client denied having any SI/SIB's/SA's/HI's at this time.     Intervention: Counselor/therapist used Cognitive Behavioral Therapy, ACT, Counselor Feedback, Education, Emotional management, Group Feedback, Mindfulness, Motivational Enhancement Therapy, Relapse Prevention, Twelve Step Facilitation, DBT and REBT.  > Client practiced the following skills in group session(s) this week; mindfulness based stress reduction skills, along with breathing exercises. Writer provided client with verbal interventions including: validation, support, encouragement, and psycho-education.     Wellness MH Skills Group:    Client was able to identify physical changes that have taken place in the case study and within themselves, talk about the dangers of " untreated mental health and chemical health, outline the progression of mental health and chemical health disorders, outline what self-care or lack of self-care activities he noticed in the case study, define what self-care means to him and how he practices it and talk about the importance of asking for and accepting help.     Assessment:    > Client was observed using the body scan and reported it helped with relaxation and anxiety. Client was observed using breathing exercises that were covered again during this weeks group sessions and client reported it helped with relaxation. Client appeared very open to continued learning for lifelong sobriety.     > Client Appears/Sounds: Cooperative, Motivated, and Engaged.     > Client appears to be in the Contemplation (Relapse) Stage within the Stages of Change Model.      Plan:   -Attend 2-3 sober support group meetings each week (this includes either 12-step or non-12-step/AA alternative meetings).  -Client will meet with his therapist once per week. ([facility])  -Client will continue to work on treatment plan objectives.  -Client will follow all recommendations of counselor and any other medical provider which includes taking all medications as prescribed.    -Client will attend all weekly Phase 2 group sessions.   -Client to engage in sober activities each week.     Holger Braga MA, LMFT, [facility]  Licensed Psychotherapist

## 2020-01-07 ASSESSMENT — ANXIETY QUESTIONNAIRES
IF YOU CHECKED OFF ANY PROBLEMS ON THIS QUESTIONNAIRE, HOW DIFFICULT HAVE THESE PROBLEMS MADE IT FOR YOU TO DO YOUR WORK, TAKE CARE OF THINGS AT HOME, OR GET ALONG WITH OTHER PEOPLE: NOT DIFFICULT AT ALL
6. BECOMING EASILY ANNOYED OR IRRITABLE: SEVERAL DAYS
3. WORRYING TOO MUCH ABOUT DIFFERENT THINGS: SEVERAL DAYS
2. NOT BEING ABLE TO STOP OR CONTROL WORRYING: SEVERAL DAYS
5. BEING SO RESTLESS THAT IT IS HARD TO SIT STILL: NOT AT ALL
GAD7 TOTAL SCORE: 6
7. FEELING AFRAID AS IF SOMETHING AWFUL MIGHT HAPPEN: SEVERAL DAYS
4. TROUBLE RELAXING: SEVERAL DAYS
1. FEELING NERVOUS, ANXIOUS, OR ON EDGE: SEVERAL DAYS

## 2020-01-07 ASSESSMENT — PATIENT HEALTH QUESTIONNAIRE - PHQ9: SUM OF ALL RESPONSES TO PHQ QUESTIONS 1-9: 5

## 2020-01-07 NOTE — PROGRESS NOTES
Case consultation:  D)  Therapist reviewed ct's case.  Ct continues to work with sexual health center.  He is connecting with peers.  Ct reports gambling.  P)  Refer for gambling assessment.    JSOESITO Morocho, LICSW, [facility]  Clinical

## 2020-01-08 ENCOUNTER — HOSPITAL ENCOUNTER (OUTPATIENT)
Dept: BEHAVIORAL HEALTH | Facility: CLINIC | Age: 40
End: 2020-01-08
Attending: SOCIAL WORKER
Payer: COMMERCIAL

## 2020-01-08 LAB
BENZODIAZ UR QL: NEGATIVE
CANNABINOIDS UR QL SCN: NEGATIVE
ETHANOL UR QL SCN: NEGATIVE

## 2020-01-08 PROCEDURE — H2035 A/D TX PROGRAM, PER HOUR: HCPCS | Mod: HQ

## 2020-01-08 PROCEDURE — 80307 DRUG TEST PRSMV CHEM ANLYZR: CPT

## 2020-01-08 PROCEDURE — 80320 DRUG SCREEN QUANTALCOHOLS: CPT

## 2020-01-08 ASSESSMENT — ANXIETY QUESTIONNAIRES: GAD7 TOTAL SCORE: 6

## 2020-01-08 NOTE — ADDENDUM NOTE
Encounter addended by: Holger Braga LMFT, [facility] on: 1/7/2020 1:07 PM   Actions taken: Charge Capture section accepted
Encounter addended by: Holger Braga LMFT, [facility] on: 1/7/2020 9:48 AM   Actions taken: Flowsheet accepted, Clinical Note Signed
Encounter addended by: Holger Braga LMFT, [facility] on: 1/6/2020 3:18 PM   Actions taken: Episode edited, Visit Navigator Flowsheet section accepted, Clinical Note Signed
[facility] on: 1/7/2020 3:49 PM   Actions taken: Clinical Note Signed
Encounter addended by: Lucille Castellanos LADC on: 1/7/2020 8:19 PM   Actions taken: Pend clinical note
no

## 2020-01-08 NOTE — PROGRESS NOTES
Treatment Plan Review  Treatment Plan Review completed on:  1/06/2020   Projected discharge date: 4/01/2020     Client preferred learning style:   Visual  Hands on  Verbal    Staff member(s) contributing: Lucille Castellanos MFA, MA, [facility]  Received supervision: Yes (explain) - Case staffing with Yane Nicole, Clinical Manager..  Client involvement with treatment planning: contributed to goals and plan.  Client received copy of plan/revised plan: Yes -   Client agrees with plan/revised plan: Yes  Changes to Treatment Plan: No changes to Treatment Plan; client signed Treatment Contract for sobriety, attendance, compliance with PO requirements, and understanding need for higher level of care if not able to maintain sobriety.    New Goals added since last review: None.     Client's Dimension 1 Goal(s) 1. Manage symptoms of withdrawal.    Current   Client's Dimension 2 Goal(s) 1. Client will manage medical concerns and follow all medical recommendations.  Yes - client followed counselor recommendations to seek medical consultation for persistent cough and correlating symptoms; client diagnosed with acute bronchitis and prescribed antibiotics.     2. Client will establish healthy sleep routine with improved symptoms of anxiety and daily functioning.       Current   Client's Dimension 3 Goal(s) 1. Client reports struggling with unresolved grief and loss, being overly emotional at times, irrational or angry, and struggling with impulsivity.  2. Client will learn coping skills to manage anxiety and depression more effectively, reducing her PHQ-9 score to <5 and FERNANDEZ-7 to < 5.      Current   Client's Dimension 4 Goal(s) 1. Address ambivalence regarding substance use and sobriety.    Current   Client's Dimension 5 Goal(s) 1. Increase resiliency of stressful situations with alternative coping skills.    Current   Client's Dimension Goal(s) 1. Gain hope and confidence for living healthy recovery lifestyle.   Current      Goal(s)  worked on since last review: Yes. Client focused on DIM 2 goal: manage medical concerns and follow medical recommendations.  Strategies effective: Yes - Client reports last date of use was 11/23/2019 and due to limited period of sobriety is encouraged at this time to focus on establishing daily structure, increasing consistency and quality of sleep, and managing health concerns.  Treatment Coordination Activities: Yes - Case review with Holger Braga MA, LMFT, LAD.       Weekly Progress Summary  Program Attendance:  Total # of Phase 1 Group Sessions: 26                                  Total # of Phase 2 Group Sessions: 2  Total # of Phase 3 Group Sessions:   Total # of 1:1 Sessions: 5     Support group attended this week: No  Reporting sobriety:   Client reports last date of use: 12/31/2019.   Medical, Mental Health and other appointments the client attended:   Client continues in treatment and therapy with [facility], attending one weekly group session and one individual therapy session.    Medication issues: Yes -      Physical and mental health problems: Yes -   Per client medical chart, client has significant medical/health consequences from chronic use of alcohol, including: brain lesion; brain shrinkage; increased liver enzymes; head injury from fall down stairs while intoxicated; and seizures in withdrawal.      Review and evaluation of the individual abuse prevention plan: The program's individual abuse prevention plan (IAPP) is sufficient for this client.      Diagnosis: Substance Use and Mental Health (DSM-5)  Substance Use Disorders:    303.90 (F10.20) Alcohol Use Disorder, Severe    Mental Health Diagnosis:  312.89 (F91.8) Other Specified Disruptive, Impulsive-Control, and Conduct Disorder (hypersexuality)   296.31 (F33.0)  Major Depressive Disorder, mild, recurrent   300.02 (F41.1)  Generalized Anxiety Disorder   309.81 (F43.10) Posttraumatic Stress Disorder      ASAM Risk  "Factors: Current Review  [facility] Risk Rating: (Note the rationale for risk rating changes)    Dimension 1 2 Client reports sober date: 12/31/2019. Client has history of continuing behaviors that impede his treatment and recovery.   Client reports symptoms, rating on scale of 0-10: sleeplessness (10), excessive sleep (10), fatigue (10), difficulty concentrating (1). Client provided information on symptoms of acute withdrawal.  Dimension 2 2 Client rates overall health at 5/10; client followed counselor recommendations to seek medical consultation for chronic cough, diagnosed with acute bronchitis; client taking prescribed antibiotics. Client reports struggle with sleep, stating problem since 9-years old.   Dimension 3 3 Client denies SI, Intent, thoughts of self-harm or harm to others. Client denies SI, but repeatedly states feelings of hopelessness, feeling like life is not worth living, and feeling like no purpose to attain sobriety. Client reports symptoms on scale of 0-10, including: racing thoughts (10); sadness (8); and hopelessness (10).    Dimension 4  4 Client rates her motivation for sobriety at 8/10, reporting motivation for \"staying alive,\" and \"getting healthy.\" Client reports primary obstacle to her motivation is \"fun.\" Client continues to report that she intends to return to drinking when probation ends.  Dimension 5 4 Client rates current cravings at 4/10. She rates her commitment to recovery at 7/10. She reports that she attended group sessions, but denies attendance and/or participation in other recovery activities. Ratings are slightly increased from previous week.  Dimension 6 3 On scale of 0-10, with 0 being supportive and 10 being highly stressful, client rates current living situation at 9/10. Client reports that she is mostly alone in current living environment. Client reports that she started part-time job, and likes increased structure.     Data: (Need to include short narrative for the week " on what was worked on)  offered feedback client did participate     Intervention:   Behavior modification  Counselor feedback  Group feedback  Relapse prevention     Assessment:    Stages of Change Model  Precontemplation     Appears/Sounds:  Depressed  Anxious     Plan:   -Attend 2-3 sober support group meetings each week (this includes non-12-step/AA alternative meetings).  -Client will meet with therapist once per week.  -Client will follow all recommendations of counselor and any other medical provider which includes taking all medications as prescribed.    -Client will follow all recommendations and/or requirements of the courts and/or probation/monitoring program.   -Client will attend all weekly Phase 2 group sessions.   -Client to engage in sober activities each week.  -Client will follow all recommendations of hisTreatment Contract.    Lucille Castellanos MFA, MA, [facility]  January 6, 2020

## 2020-01-09 ENCOUNTER — OFFICE VISIT (OUTPATIENT)
Dept: OTHER | Facility: OUTPATIENT CENTER | Age: 40
End: 2020-01-09
Payer: COMMERCIAL

## 2020-01-09 DIAGNOSIS — F33.0 MAJOR DEPRESSIVE DISORDER, RECURRENT EPISODE, MILD (H): ICD-10-CM

## 2020-01-09 DIAGNOSIS — F91.8 OTHER CONDUCT DISORDERS: Primary | ICD-10-CM

## 2020-01-09 DIAGNOSIS — F41.1 GENERALIZED ANXIETY DISORDER: ICD-10-CM

## 2020-01-09 DIAGNOSIS — F10.21 ALCOHOL USE DISORDER, MODERATE, IN EARLY REMISSION, DEPENDENCE (H): ICD-10-CM

## 2020-01-09 DIAGNOSIS — F43.10 POSTTRAUMATIC STRESS DISORDER: ICD-10-CM

## 2020-01-09 NOTE — PROGRESS NOTES
IOP Co-OCCURRING GROUP ATTENDANCE NOTE      Wednesday    Group Date: 1/8/2020 - 9:00 AM - 12:00 PM   Group Attendance Attended group session   Group Therapy Type Addiction, Life skill(s) and Psychoeducation   Group Topic Covered REBT Process on Effects of Addiction; Disease of Addiction/Choices in Recovery, Goal Setting and Hope.   Client's Response To Group Topic Cooperative with task Discussed personal experience with topic   Client Group Participation Detail Highly involved   Group Attendance (Time) 3.0 Hours   Individual Attendance None   Family Attendance None   Other Comments/Information Group participated in psychoeducation and REBT process on effects of addiction. Client presented as open and willing to understand information, to apply information to his experience.

## 2020-01-10 NOTE — PROGRESS NOTES
[facility]  Group Progress Note  Date of Service: 1/09/20  Client Name: Shawn Camejo  YOB: 1980  MRN:  [id number]  Treating Provider: Venita Lares, PhD and Shireen Pratt, PhD, Postdoctoral Fellow  Type of Session: Group  Number of Minutes: 120 min              Health Maintenance Summary - Mental Health Treatment Plan        Status Date       MENTAL HEALTH TX PLAN Next Due 8/11/2020         Done 9/11/2019 [facility] MENTAL HEALTH TX PLAN SCAN       Done 9/28/2018            Current Symptoms/Status:     Compulsive Sexual Behavior: Pattern of engaging in compulsive sexual behaviors causing depression, worry, critical internal messages about his sex. Thoughts and urges to act out sexually on a daily basis. Client was able to stay within his sexual boundaries. He still has been crossing addictions (alcohol and gambling) with his sexual compulsive behavior.     Depression: The current status is permanent moderate depressive state. He endorses the following depression symptoms: ? Self-Esteem/Guilt; and Anger/Rage.     Alcohol Use Disorder: Pattern of alcohol use that is excessive, causing anxiety, worry, interferes with social and emotional relationships, thoughts, and urges to drink alcohol. Client was unable to stay within his alcohol boundaries     Anxiety: He endorses the following anxiety symptoms: Anxious; Nervous; Easily Annoyed; and Fear/Dread    Posttraumatic Stress Disorder: Not being diagnosed today.    Progress Toward Treatment Goals:   Client continues to show interest and commitment towards receiving services at [facility].  Client joined CSB group 4/20/19. Remains motivated to continue with group sessions.  He is in an AA treatment group at [facility] (problems to attended meetings).  Continued working on his assignments.  Client was able to keeping his week goals.    Intervention: Modality and Description:  Utilized supportive and CBT techniques  were used to address CSB and related mental health issues. The focus of today's session was on talking about his behavior patterns in relation to the expectations of others.    Response to Intervention:  Client has been confused about some episodes in social situations that he has been experiencing. He realized that the difficulties lie in a pattern of behavior that he has maintained since childhood. Such behaviors always have negative consequences for him. He is looking for alternatives to deal differently in the social situations that he is facing. He has sought feedback from the group regarding these alternatives.    Assignment:  - Continue attending CSB and AA group (permanent assignment).     Interactive Complexity:  N/A      Diagnosis:  312.89 (F91.8) Other Specified Disruptive, Impulsive-Control, and Conduct Disorder (hypersexuality)   296.31 (F33.0)  Major Depressive Disorder, mild, recurrent   300.02 (F41.1)  Generalized Anxiety Disorder   309.81 (F43.10) Posttraumatic Stress Disorder   303.90 (F10.20) Alcohol Use Disorder      Plan / Need for Future Services:  Return for individual therapy every week to address CSB. Continue weekly group therapy.     Shireen Pratt, PhD, Postdoctoral Fellow

## 2020-01-10 NOTE — PROGRESS NOTES
[facility] -  Case Progress Note    Date of Service: 1/09/20  Client Name: Shawn Camejo  YOB: 1980  MRN:  [id number]  Treating Provider: Shireen Pratt, PhD, Postdoctoral Fellow  Type of Session: Individual  Present in Session: Client only  Number of Minutes: 53 min    Health Maintenance Summary - Mental Health Treatment Plan       Status Date      MENTAL HEALTH TX PLAN Next Due 8/11/2020      Done 9/11/2019 [facility] MENTAL HEALTH TX PLAN SCAN     Done 9/28/2018           Current Symptoms/Status:    Compulsive Sexual Behavior: Pattern of engaging in compulsive sexual behaviors causing depression, worry, critical internal messages about his sex. Thoughts and urges to act out sexually on a daily basis. Client was able to stay within his sexual boundaries. He still has been crossing addictions (alcohol and gambling) with his sexual compulsive behavior.     Depression: The current status is permanent moderate depressive state. He endorses the following depression symptoms: ? Self-Esteem/Guilt; and Anger/Rage.     Alcohol Use Disorder: Pattern of alcohol use that is excessive, causing anxiety, worry, interferes with social and emotional relationships, thoughts, and urges to drink alcohol. Client was unable to stay within his alcohol boundaries     Anxiety: He endorses the following anxiety symptoms: Anxious; Nervous; Easily Annoyed; and Fear/Dread        Progress Toward Treatment Goals:   Session # 10 - Client continues to show interest and commitment towards receiving services at [facility].  Client joined CSB group 4/20/19. Remains motivated to continue with group sessions.  He is in an AA treatment group at [facility] (problems to attended meetings).  Continued working on his assignments.  Client was able to keeping his week goals.    Intervention: Modality and Description:  Individual, interpersonal, CBT. The focus of today's session was on his negative  core and the use of assertiveness to deal with problems.      Response to Intervention:  Client has tried to be honest about his behaviors and difficulties. He has understood how positively this attitude has been, especially for him on dealing with his compulsiveness and addictions. In addition, he realized that he often activates his negative core and he starts to behave in ways that are detrimental to himself. He has been able to perceive the distortions that he makes of social aspects and reality that favor the maintenance of his problem. The behavior of seeking help and seeking to understand what is happening was reinforced instead of denying and avoiding.     Assignment:  - Continue attending CSB and AA group (permanent assignment).  - Continue applying sleep hygiene using a sleep hygiene handout and what was discussed in session. The result will be evaluated in a future session. 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 Continue weekly group therapy.     Shireen Pratt, PhD, Postdoctoral Fellow

## 2020-01-13 ENCOUNTER — HOSPITAL ENCOUNTER (OUTPATIENT)
Dept: BEHAVIORAL HEALTH | Facility: CLINIC | Age: 40
End: 2020-01-13
Attending: SOCIAL WORKER
Payer: COMMERCIAL

## 2020-01-13 PROCEDURE — H2035 A/D TX PROGRAM, PER HOUR: HCPCS | Mod: HQ

## 2020-01-13 NOTE — ADDENDUM NOTE
Encounter addended by: Lucille Castellanos LADC on: 1/13/2020 3:58 PM   Actions taken: Pend clinical note

## 2020-01-13 NOTE — PROGRESS NOTES
IOP CO-OCCURRING GROUP SESSION NOTE     Attendance     Monday    Group Date: 1/13/2020 - 9:00 AM - 12:00 PM   Group Attendance Attended group session   Group Therapy Type Addiction, Life skill(s) and Psychoeducation   Group Topic Covered Cognitive Therapy Techniques, Disease of Addiction/Choices in Recovery, Change strategies; Goal Setting and Self-Esteem/Self Leona   Client's Response To Group Topic Cooperative with task Discussed personal experience with topic   Client Group Participation Detail Highly involved   Group Attendance (Time) 3.0 Hours   Individual Attendance None   Family Attendance None   Other Comments/Information Group participated in psychoeducation on change strategies, goal-setting, effects on self-worth in early recovery. Client shared that he is sets short-term goals but often not able to follow-through due to feeling stressed.

## 2020-01-13 NOTE — PROGRESS NOTES
Treatment Plan Review  Treatment Plan Review completed on:  1/13/2020   Projected discharge date: 4/01/2020     Client preferred learning style:   Visual  Hands on  Verbal    Staff member(s) contributing: Lucille Castellanos MFA, MA, [facility]  Received supervision: Yes (explain) - Case staffing [place], Lead Counselor; Angelo Rowell, [facility] Supervisor.  Client involvement with treatment planning: contributed to goals and plan.  Client received copy of plan/revised plan: Yes -   Client agrees with plan/revised plan: Yes  Changes to Treatment Plan: No changes to Treatment Plan; client signed Treatment Contract for sobriety, attendance, compliance with PO requirements, and understanding need for higher level of care if not able to maintain sobriety. Clinical team completing review to evaluate for level of care needs.  Last Date of Use: Client reports last date of use for alcohol as 1/08/2020. Client reports drinking 2 beers and then continuing cycle of behaviors, including violations of sexual boundaries defined in his treatment with [facility] and gambling activities. Client continues to report use episodes throughout engagement in IOP Co -Occurring Disorders treatment.     New Goals added since last review: None.     Client's Dimension 1 Goal(s) 1. Manage symptoms of withdrawal.    Current   Client's Dimension 2 Goal(s) 1. Client will manage medical concerns and follow all medical recommendations.  Yes - client followed counselor recommendations to seek medical consultation for persistent cough and correlating symptoms; client diagnosed with acute bronchitis and prescribed antibiotics.     2. Client will establish healthy sleep routine with improved symptoms of anxiety and daily functioning.       Current   Client's Dimension 3 Goal(s) 1. Client reports struggling with unresolved grief and loss, being overly emotional at times, irrational or angry, and struggling with impulsivity.  2. Client will learn  coping skills to manage anxiety and depression more effectively, reducing her PHQ-9 score to <5 and FERNANDEZ-7 to < 5.      Current   Client's Dimension 4 Goal(s) 1. Address ambivalence regarding substance use and sobriety.    Current   Client's Dimension 5 Goal(s) 1. Increase resiliency of stressful situations with alternative coping skills.    Current   Client's Dimension Goal(s) 1. Gain hope and confidence for living healthy recovery lifestyle.   Current      Goal(s) worked on since last review: Yes. Client focused on DIM 2 goal: manage medical concerns and follow medical recommendations.  Strategies effective: Yes - Client reports last date of use was 1/08/2020; previous last date of use was 12/31/2019. Client presents as minimizing use episodes.  Treatment Coordination Activities: Yes - Case review with Holger Braga MA, [facility], [facility].       Weekly Progress Summary  Program Attendance:  Total # of Phase 1 Group Sessions: 26                                  Total # of Phase 2 Group Sessions: 6  Total # of Phase 3 Group Sessions:   Total # of 1:1 Sessions: 5     Support group attended this week: No  Reporting sobriety:   Client reports last date of use: 1/08/2020.   Medical, Mental Health and other appointments the client attended:   Client continues in treatment and therapy with [facility], attending one weekly group session and one individual therapy session.    Medication issues: Client denies concerns with prescribed medications.     Physical and mental health problems: Yes -   Per client medical chart, client has significant medical/health consequences from chronic use of alcohol, including:      Review and evaluation of the individual abuse prevention plan: The program's individual abuse prevention plan (IAPP) is sufficient for this client.      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 Client has history of continuing behaviors that impede his treatment and recovery.   Client reports symptoms, rating on scale of 0-10: sleeplessness (10), excessive sleep (10), fatigue (10), difficulty concentrating (1). Client provided information on symptoms of acute withdrawal.  Dimension 2 2 Client rates overall health at 5/10; client followed counselor recommendations to seek medical consultation for chronic cough, diagnosed with acute bronchitis; client taking prescribed antibiotics. Client reports struggle with sleep, stating problem since 9-years old.   Dimension 3 3 Client denies SI, Intent, thoughts of self-harm or harm to others. Client denies SI, but repeatedly states feelings of hopelessness, feeling like life is not worth living, and feeling like no purpose to attain sobriety. Client reports symptoms on scale of 0-10, including: racing thoughts (10); sadness (8); and hopelessness (10).    Dimension 4  4 Client rates her motivation for sobriety at 8/10, reporting motivation for \"staying alive,\" and \"getting healthy.\" Client reports primary obstacle to her motivation is \"fun.\" Client continues to report that she intends to return to drinking when probation ends.  Dimension 5 4 Client rates current cravings at 4/10. She rates her commitment to recovery at 7/10. She reports that she attended group sessions, but denies attendance and/or participation in other recovery activities. Ratings are slightly increased from previous week.  Dimension 6 3 On scale of 0-10, with 0 being supportive " and 10 being highly stressful, client rates current living situation at 9/10. Client reports that she is mostly alone in current living environment. Client reports that she started part-time job, and likes increased structure.     Data: (Need to include short narrative for the week on what was worked on)  offered feedback client did participate     Intervention:   Behavior modification  Counselor feedback  Group feedback  Relapse prevention     Assessment:    Stages of Change Model  Precontemplation     Appears/Sounds:  Depressed  Anxious     Plan:   -Attend 2-3 sober support group meetings each week (this includes non-12-step/AA alternative meetings).  -Client will meet with therapist once per week.  -Client will follow all recommendations of counselor and any other medical provider which includes taking all medications as prescribed.    -Client will follow all recommendations and/or requirements of the courts and/or probation/monitoring program.   -Client will attend all weekly Phase 2 group sessions.   -Client to engage in sober activities each week.  -Client will follow all recommendations of hisTreatment Contract.    Lucille Castellanos MFA, MA, LADC  January 6, 2020

## 2020-01-16 ENCOUNTER — HOSPITAL ENCOUNTER (OUTPATIENT)
Dept: BEHAVIORAL HEALTH | Facility: CLINIC | Age: 40
End: 2020-01-16
Attending: SOCIAL WORKER
Payer: COMMERCIAL

## 2020-01-16 ENCOUNTER — OFFICE VISIT (OUTPATIENT)
Dept: OTHER | Facility: OUTPATIENT CENTER | Age: 40
End: 2020-01-16
Payer: COMMERCIAL

## 2020-01-16 DIAGNOSIS — F41.1 GENERALIZED ANXIETY DISORDER: ICD-10-CM

## 2020-01-16 DIAGNOSIS — F33.0 MAJOR DEPRESSIVE DISORDER, RECURRENT EPISODE, MILD (H): ICD-10-CM

## 2020-01-16 DIAGNOSIS — F10.21 ALCOHOL USE DISORDER, MODERATE, IN EARLY REMISSION, DEPENDENCE (H): ICD-10-CM

## 2020-01-16 DIAGNOSIS — F43.10 POSTTRAUMATIC STRESS DISORDER: ICD-10-CM

## 2020-01-16 DIAGNOSIS — F91.8 OTHER CONDUCT DISORDERS: Primary | ICD-10-CM

## 2020-01-16 PROCEDURE — H2035 A/D TX PROGRAM, PER HOUR: HCPCS

## 2020-01-16 NOTE — PROGRESS NOTES
[facility] -  Case Progress Note    Date of Service: 1/16/20  Client Name: Shawn Camejo  YOB: 1980  MRN:  [id number]  Treating Provider: Shireen Pratt, PhD, Postdoctoral Fellow  Type of Session: Individual  Present in Session: Client only  Number of Minutes: 53 min    Health Maintenance Summary - Mental Health Treatment Plan       Status Date      MENTAL HEALTH TX PLAN Next Due 8/11/2020      Done 9/11/2019 [facility] MENTAL HEALTH TX PLAN SCAN     Done 9/28/2018           Current Symptoms/Status:    Compulsive Sexual Behavior: Pattern of engaging in compulsive sexual behaviors causing depression, worry, critical internal messages about his sexuality and relatioship. Thoughts and urges to act out sexually on a daily basis. Client was able to stay within his sexual boundaries. Risk activity was reported.     Depression: The current status is permanent moderate depressive state. He endorses the following depression symptoms: ? Self-Esteem/Guilt; and Depressed/Hopeless.     Alcohol Use Disorder: Pattern of alcohol use that is excessive, causing anxiety, worry, interferes with social and emotional relationships, thoughts, and urges to drink alcohol. Client was able to stay within his alcohol boundaries     Anxiety: He endorses the following anxiety symptoms: Anxious; Nervous; Easily Annoyed; and Fear/Dread          Progress Toward Treatment Goals:   Client continues to show interest and commitment towards receiving services at [facility].  Client joined CSB group 4/20/19. Remains motivated to continue with group sessions.  He is in an AA treatment group at [facility] (problems to attended meetings).  Continued working on his assignments.  Client was able to keeping his week goals.      Progress Toward Treatment Goals:   Session # 11 - Client continues to show interest and commitment towards receiving services at [facility].  Client joined CSB group 4/20/19. Remains  motivated to continue with group sessions.  He is in an AA treatment group at [facility] (problems to attended meetings).  Continued working on his assignments.  Client was able to keeping his week goals.    Intervention: Modality and Description:  Individual, interpersonal, CBT. The focus of today's session was on working to reduce the negative internal messages he has about sexuality and relationships.    Response to Intervention:  Client was reinforced in the way he has responded assertively to the difficulties that he has been experiencing. He has responded positively to the reframing work of  the way he has made the choices to live his relationships and his sexuality. The current difficulties lie in interrupting the compulsive act when it starts. He has diligently maintained ways to maintain his short goals for the past three weeks.    Assignment:  - Continue attending CSB and AA group (permanent assignment).  - Continue applying sleep hygiene using a sleep hygiene handout and what was discussed in session. The result will be evaluated in a future session. 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 Continue weekly group therapy.     Shireen Pratt, PhD, Postdoctoral Fellow

## 2020-01-16 NOTE — PROGRESS NOTES
"  Summary Notes: Individual Session      Data: Met with client on 1/16/2020 for individual session to review Treatment Plan assignment.  Client has reported continuing      Intervention: Provided person-centered, empathic support and positive regard. Provided information on expanded conceptualization of \"codependence\" and \"dysfunctional family,\" to shift from \"shame\" model.     Assessment: Client presented with increased motivation for change,     Plan: Client to continue weekly individual session with counselor. Client will transition to Phase 3 level of care beginning week of January 27, 2020. Client will present treatment plan assignment on Self-Control and present information and reflections to group.  "

## 2020-01-17 NOTE — PROGRESS NOTES
[facility]  Group Progress Note  Date of Service: 1/16/20  Client Name: Shawn Camejo  YOB: 1980  MRN:  [id number]  Treating Provider: Venita Lares, PhD and Shireen Pratt, PhD, Postdoctoral Fellow  Type of Session: Group  Number of Minutes: 120 min              Health Maintenance Summary - Mental Health Treatment Plan        Status Date       MENTAL HEALTH TX PLAN Next Due 8/11/2020         Done 9/11/2019 [facility] MENTAL HEALTH TX PLAN SCAN       Done 9/28/2018            Current Symptoms/Status:     Compulsive Sexual Behavior: Pattern of engaging in compulsive sexual behaviors causing depression, worry, critical internal messages about his sex. Thoughts and urges to act out sexually on a daily basis. Client was able to stay within his sexual boundaries. Risk activity was reported.     Depression: The current status is permanent moderate depressive state. He endorses the following depression symptoms: ? Self-Esteem/Guilt; and Depressed/Hopeless.     Alcohol Use Disorder: Pattern of alcohol use that is excessive, causing anxiety, worry, interferes with social and emotional relationships, thoughts, and urges to drink alcohol. Client was able to stay within his alcohol boundaries     Anxiety: He endorses the following anxiety symptoms: Anxious; Nervous; Easily Annoyed; and Fear/Dread    Posttraumatic Stress Disorder: Not being diagnosed today.    Progress Toward Treatment Goals:   Client continues to show interest and commitment towards receiving services at [facility].  Client joined CSB group 4/20/19. Remains motivated to continue with group sessions.  He is in an AA treatment group at [facility] (problems to attended meetings).  Continued working on his assignments.  Client was able to keeping his week goals.    Intervention: Modality and Description:  Utilized supportive and CBT techniques were used to address CSB and related mental health issues. The  focus of today's session was on talking about his antecedents and connect tem with his Family enneagram.    Response to Intervention:  Client shared his numb feeling and he associated that with his past and current life situations. He had a strong emotional response and cried the entire time he shared it. He was able to connect a list of other negative feelings associated with that. He acknowledged that the maintenance of the negative core has become clearer to him.    Assignment:  - Continue attending CSB and AA group (permanent assignment).     Interactive Complexity:  N/A      Diagnosis:  312.89 (F91.8) Other Specified Disruptive, Impulsive-Control, and Conduct Disorder (hypersexuality)   296.31 (F33.0)  Major Depressive Disorder, mild, recurrent   300.02 (F41.1)  Generalized Anxiety Disorder   309.81 (F43.10) Posttraumatic Stress Disorder   303.90 (F10.20) Alcohol Use Disorder      Plan / Need for Future Services:  Return for individual therapy every week to address CSB. Continue weekly group therapy.     Shireen Pratt, PhD, Postdoctoral Fellow

## 2020-01-20 ENCOUNTER — HOSPITAL ENCOUNTER (OUTPATIENT)
Dept: BEHAVIORAL HEALTH | Facility: CLINIC | Age: 40
End: 2020-01-20
Attending: SOCIAL WORKER
Payer: COMMERCIAL

## 2020-01-20 PROCEDURE — H2035 A/D TX PROGRAM, PER HOUR: HCPCS | Mod: HQ

## 2020-01-20 NOTE — PROGRESS NOTES
IOP CO-OCCURRING GROUP SESSION NOTE     Attendance     Monday    Group Date: 1/20/2020 - 9:00 AM - 12:00 PM   Group Attendance Attended group session   Group Therapy Type Addiction, Psychoeducation   Group Topic Covered Mindfulness-based CBT; Motivational Interviewing; Disease of Addiction; Effects of Marijuana   Client's Response To Group Topic Cooperative with task Discussed personal experience with topic   Client Group Participation Detail Highly involved   Group Attendance (Time) 3.0 Hours   Individual Attendance None   Family Attendance None   Other Comments/Information Group participated in mindfulness exercise for increasing distress tolerance; processed current motivation for sobriety/recovery; and met in pairs to review short summaries from [facility] on the effects of marijuana use, with each pair then preparing and presenting information to full group. Clients processed insights gained from review. This client noted that he was most concerned about AKT1 gene, with increased risk of developing psychosis.

## 2020-01-21 PROCEDURE — H2035 A/D TX PROGRAM, PER HOUR: HCPCS

## 2020-01-22 ENCOUNTER — HOSPITAL ENCOUNTER (OUTPATIENT)
Dept: BEHAVIORAL HEALTH | Facility: CLINIC | Age: 40
End: 2020-01-22
Attending: SOCIAL WORKER
Payer: COMMERCIAL

## 2020-01-22 PROCEDURE — H2035 A/D TX PROGRAM, PER HOUR: HCPCS | Mod: HQ

## 2020-01-22 NOTE — PROGRESS NOTES
IOP CO-OCCURRING INDIVDIUAL SESSION NOTE     Attendance    Monday  Date: 1/21/2020   Group Attendance Attended group session   Group Therapy Type Addiction, Psychoeducation   Group Topic Covered Mindfulness-based CBT; Motivational Interviewing; Disease of Addiction; Effects of Marijuana   Client's Response To Group Topic Cooperative with task Discussed personal experience with topic   Client Group Participation Detail Highly involved   Group Attendance (Time) None   Individual Attendance 1.0 Hour   Family Attendance None   Other Comments/Information  Met with client for individual session to review treatment plan assignments and process current treatment and recovery needs.

## 2020-01-23 ENCOUNTER — OFFICE VISIT (OUTPATIENT)
Dept: OTHER | Facility: OUTPATIENT CENTER | Age: 40
End: 2020-01-23
Payer: COMMERCIAL

## 2020-01-23 DIAGNOSIS — F33.0 MAJOR DEPRESSIVE DISORDER, RECURRENT EPISODE, MILD (H): ICD-10-CM

## 2020-01-23 DIAGNOSIS — F91.8 OTHER CONDUCT DISORDERS: Primary | ICD-10-CM

## 2020-01-23 DIAGNOSIS — F10.21 ALCOHOL USE DISORDER, MODERATE, IN EARLY REMISSION, DEPENDENCE (H): ICD-10-CM

## 2020-01-23 DIAGNOSIS — F41.1 GENERALIZED ANXIETY DISORDER: ICD-10-CM

## 2020-01-23 DIAGNOSIS — F43.10 POSTTRAUMATIC STRESS DISORDER: ICD-10-CM

## 2020-01-23 NOTE — PROGRESS NOTES
"             IOP CO-OCCURRING GROUP SESSION NOTE     Attendance     Wednesday    Group Date: 1/22/2020 - 9:00 AM - 12:00 PM   Group Attendance Attended group session   Group Therapy Type Addiction, Life skill(s) and Psychoeducation   Group Topic Covered Cognitive Therapy Techniques, Disease of Addiction/Choices in Recovery, Goal Setting and Mindfulness   Client's Response To Group Topic Cooperative with task Discussed personal experience with topic   Client Group Participation Detail Highly involved   Group Attendance (Time) 3.0 Hours   Individual Attendance None   Family Attendance None   Other Comments/Information Group participated in opening meditation practice on \"Courage\" with focus on recovery.  Clients provided information on goal-setting on small action steps for change. Clients also completed SMART Recovery Checklist and shared results, identifying both recovery actions most effective at this time. Client transitioning to Phase 3 shared gains achieved in this program, and group members provided feedback on helpful actions from client.        "

## 2020-01-24 NOTE — PROGRESS NOTES
[facility]  Group Progress Note  Date of Service: 1/23/20  Client Name: Shawn Camejo  YOB: 1980  MRN:  [id number]  Treating Provider: Venita Lares, PhD and Shireen Pratt, PhD, Postdoctoral Fellow  Type of Session: Group  Number of Minutes: 120 min              Health Maintenance Summary - Mental Health Treatment Plan        Status Date       MENTAL HEALTH TX PLAN Next Due 8/11/2020         Done 9/11/2019 [facility] MENTAL HEALTH TX PLAN SCAN       Done 9/28/2018            Current Symptoms/Status:     Compulsive Sexual Behavior: Pattern of engaging in compulsive sexual behaviors causing depression, worry, critical internal messages about his sex. Thoughts and urges to act out sexually on a daily basis. Increased masturbatory frequency, long periods of activity. Client was able to stay within his sexual boundaries.      Depression: The current status is permanent moderate depressive state. He endorses the following depression symptoms: ? Self-Esteem/Guilt; Depressed/Hopeless and Sleeping Difficulties.     Alcohol Use Disorder: Pattern of alcohol use that is excessive, causing anxiety interferes with social and emotional relationships, thoughts, and urges to drink alcohol. Client was able to stay within his alcohol boundaries     Anxiety: He endorses the following anxiety symptoms: Anxious; Nervous; and Easily Annoyed.    Posttraumatic Stress Disorder: Not being diagnosed today.    Progress Toward Treatment Goals:   Client continues to show interest and commitment towards receiving services at [facility].  Client joined CSB group 4/20/19. Remains motivated to continue with group sessions.  He is in an AA treatment group at [facility] (Now he is in stage 3 of the treatment, going to just one individual session).  Continued working on his assignments.  Client was able to keeping his week goals.    Intervention: Modality and Description:  Utilized supportive and  CBT techniques were used to address CSB and related mental health issues. The focus of today's session was on talking about his emotional states.    Response to Intervention:  Client shared that he has felt more emotions and feelings. He has noticed this since the last session and he has been scared about it. He was encouraged to be open to expressing these emotions rather than avoiding them. He understood that being connected to these emotions could bring him awareness and control over his vulnerabilities.     Assignment:  - Continue attending CSB and AA group (permanent assignment).     Interactive Complexity:  N/A      Diagnosis:  312.89 (F91.8) Other Specified Disruptive, Impulsive-Control, and Conduct Disorder (hypersexuality)   296.31 (F33.0)  Major Depressive Disorder, mild, recurrent   300.02 (F41.1)  Generalized Anxiety Disorder   309.81 (F43.10) Posttraumatic Stress Disorder   303.90 (F10.20) Alcohol Use Disorder      Plan / Need for Future Services:  Return for individual therapy every week to address CSB. Continue weekly group therapy.     Shireen Pratt, PhD, Postdoctoral Fellow

## 2020-01-24 NOTE — PROGRESS NOTES
Treatment Plan Review  Treatment Plan Review completed on:  1/20/2020   Projected discharge date: 4/01/2020     Client preferred learning style:   Visual  Hands on  Verbal    Staff member(s) contributing: Lucille Castellanos MFA, MA, [facility]  Received supervision: Yes (explain) - Case staffing [place], Lead Counselor; Angelo Rowell, [facility] Supervisor.  Client involvement with treatment planning: contributed to goals and plan.  Client received copy of plan/revised plan: Yes -   Client agrees with plan/revised plan: Yes  Changes to Treatment Plan: No changes to Treatment Plan; client signed Treatment Contract for sobriety, attendance, compliance with PO requirements, and understanding need for higher level of care if not able to maintain sobriety. Clinical team completing review to evaluate for level of care needs.  Last Date of Use: Client reports last date of use for alcohol as 1/08/2020. Client reports drinking 2 beers and then continuing cycle of behaviors, including violations of sexual boundaries defined in his treatment with [place] [facility] and gambling activities. Client continues to report use episodes throughout engagement in IOP Co -Occurring Disorders treatment.     New Goals added since last review: None.     Client's Dimension 1 Goal(s) 1. Manage symptoms of withdrawal.  Current   Client's Dimension 2 Goal(s) 1. Client will manage medical concerns and follow all medical recommendations.  2. Client will establish healthy sleep routine with improved symptoms of anxiety and daily functioning.     Current   Client's Dimension 3 Goal(s) 1. Client reports struggling with unresolved grief and loss, being overly emotional at times, irrational or angry, and struggling with impulsivity.  2. Client will learn coping skills to manage anxiety and depression more effectively, reducing her PHQ-9 score to <5 and FERNANDEZ-7 to < 5.        Current   Client's Dimension 4 Goal(s) 1. Address ambivalence regarding substance  use and sobriety.  Current   Client's Dimension 5 Goal(s) 1. Increase resiliency of stressful situations with alternative coping skills.  Current   Client's Dimension Goal(s) 1. Gain hope and confidence for living healthy recovery lifestyle. Current      Goal(s) worked on since last review: Yes. Client focused on Dimension 3 Goal: gaining knowledge and ability to manage symptoms of anxiety and depression; Dimension 5  Goal: increase resiliency; and Dimension 4 Goal: Address ambivalence regarding substance use and alternative coping skills.  Strategies effective: Yes - Client reports last date of use was 1/12/2020; previous last date of use was 1/08/2020. Client's repeated report of continuing use is problematic for continued focus on treatment for substance use. Following case consultation, strategies adapted to best fit client treatment at this time.  Treatment Coordination Activities: Yes - Case review with Holger Braga MA, LMFT, [facility].     Weekly Progress Summary  Program Attendance:  Total # of Phase 1 Group Sessions: 34                                  Total # of Phase 2 Group Sessions: 8  Total # of Phase 3 Group Sessions:   Total # of 1:1 Sessions: 7     Support group attended this week: Yes - client reports attending one recovery support group.  Reporting sobriety:   Client reports last date of use: 1/13/2020.   Medical, Mental Health and other appointments the client attended:   Client continues in treatment and therapy with [facility], attending one weekly group session and one individual therapy session.    Medication issues: Client denies concerns with prescribed medications.  Physical and mental health problems: Yes -   Mental Health diagnosis provided below. Client reports continued episodes for alcohol use, but reports minimal use during incidents of sexually compulsive and gambling behaviors.   Review and evaluation of the individual abuse prevention plan: The program's  individual abuse prevention plan (IAPP) is sufficient for this client.      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 Client has history of continuing behaviors that impede his treatment and recovery.   Client did not report ratings for symptoms of PAWS.  Dimension 2 2 Client rates overall health at 7/10. Client reports continued struggle with sleep.   Dimension 3 3 Client denies SI, Intent, thoughts of self-harm or harm to others. Client did not provide ratings on symptoms for assessment of mental health.   Dimension 4  4 Client rates his motivation and commitment for sobriety each at 5/10. Client reports that focusing on his attitude of helplessness as obstacle to his recovery.  Dimension 5 4 Client rates current cravings at 2/10. He reports that problems with car was significant stressor, causing feelings of frustration.   Dimension 6 3 On scale of 0-10, with 0 being supportive and 10 being highly stressful, client rates current living situation at 6/10. Client has reported previously goal to seek alternative sober housing, but he does stay consistent with intention.     Data: (Need to include short narrative for the week on what was worked on)  offered feedback client did participate  Client consistently reports continued incidents in which he uses alcohol, but reports use is minimal; client reports continued and persistent incidents of sexual compulsivity and gambling compulsivity and reports  "drinking \"a couple of beers\" or a \"couple of sips.\" Client consistently reports feeling helpless, disappointed, and unable to control his behaviors. Client reports that he was recommended to participate in this IOP Co-Occurring group to \"add structure\" to his week while he participates in treatment in the [facility]. Client demonstrates extensive knowledge about treatment and recovery for substance use but presents in precontemplation stage of change. Client has not completed treatment plan assignments.      Intervention:   Behavior modification  Motivational Interviewing  Cognitive Behavioral Therapy  Counselor feedback  Group feedback  Relapse prevention    In case consultation with [place], Lead Counselor, client reviewed for transition to Phase 3 for individual focus on completing treatment plan assignments and implementing action plan to increase sober support and recovery network. Client presents in precontemplation stage of change, with persistent reports of \"cycle of using\" behaviors. Client presents with significant knowledge of treatment for substance use and healthy recovery behaviors; continued engagement in co-occurring treatment group does not meet client's current clinical needs.     Assessment:    Stages of Change Model  Precontemplation     Appears/Sounds:  Depressed  Anxious     Plan:   -Attend 2-3 sober support group meetings each week (this includes non-12-step/AA alternative meetings).  -Client will meet with therapist once per week.  -Client will follow all recommendations of counselor and any other medical provider which includes taking all medications as prescribed.    -Client will follow all recommendations and/or requirements of the courts and/or probation/monitoring program.   -Client will attend all weekly Phase 3 group sessions and implement action plan for increased self-management of his recovery lifestyle.  -Client to engage in sober activities " each week.  -Client will follow all recommendations of hisTreatment Contract.    Lucille Castellanos MFA, MA, [facility]  January 21, 2020

## 2020-01-28 ENCOUNTER — HOSPITAL ENCOUNTER (OUTPATIENT)
Dept: BEHAVIORAL HEALTH | Facility: CLINIC | Age: 40
End: 2020-01-28
Attending: SOCIAL WORKER
Payer: COMMERCIAL

## 2020-01-28 PROCEDURE — H2035 A/D TX PROGRAM, PER HOUR: HCPCS

## 2020-01-30 ENCOUNTER — OFFICE VISIT (OUTPATIENT)
Dept: OTHER | Facility: OUTPATIENT CENTER | Age: 40
End: 2020-01-30
Payer: COMMERCIAL

## 2020-01-30 DIAGNOSIS — F91.8 OTHER CONDUCT DISORDERS: Primary | ICD-10-CM

## 2020-01-30 DIAGNOSIS — F10.21 ALCOHOL USE DISORDER, MODERATE, IN EARLY REMISSION, DEPENDENCE (H): ICD-10-CM

## 2020-01-30 DIAGNOSIS — F33.0 MAJOR DEPRESSIVE DISORDER, RECURRENT EPISODE, MILD (H): ICD-10-CM

## 2020-01-30 DIAGNOSIS — F43.10 POSTTRAUMATIC STRESS DISORDER: ICD-10-CM

## 2020-01-30 DIAGNOSIS — F41.1 GENERALIZED ANXIETY DISORDER: ICD-10-CM

## 2020-01-30 NOTE — PROGRESS NOTES
[place] [facility] -  Case Progress Note    Date of Service: 1/30/20  Client Name: Shawn Camejo  YOB: 1980  MRN:  [id number]  Treating Provider: Shireen Pratt, PhD, Postdoctoral Fellow  Type of Session: Individual  Present in Session: Client only  Number of Minutes: 53 min    Health Maintenance Summary - Mental Health Treatment Plan       Status Date      MENTAL HEALTH TX PLAN Next Due 8/11/2020      Done 9/11/2019 [facility] MENTAL HEALTH TX PLAN SCAN     Done 9/28/2018           Current Symptoms/Status:    Compulsive Sexual Behavior: Pattern of engaging in compulsive sexual behaviors causing depression, worry, critical internal messages about his sexuality. Thoughts and urges to act out sexually on a daily basis. Client was able to stay within his sexual boundaries.      Depression: The current status is permanent moderate depressive state. He endorses the following depression symptoms: ? Self-Esteem/Guilt; Depressed/Hopeless and Sleeping Difficulties.     Alcohol Use Disorder: Pattern of alcohol use that is excessive, causing anxiety, worry, interferes with social and emotional relationships. Thoughts, and urges to drink alcohol in social situations. Client was able to stay within his alcohol boundaries.     Anxiety: He endorses the following anxiety symptoms: Anxious; Nervous; Easily Annoyed; Fear/Loss Control; Trouble relaxing; and Fear/Dread.     Progress Toward Treatment Goals:   Session # 12 - Client continues to show interest and commitment towards receiving services at [facility].  He is in an AA treatment group at [facility] (Now he is in stage 3 of the treatment, going to just one individual session).  Continued working on his assignments.  Client was able to keeping his week goals.    Intervention: Modality and Description:  Individual, interpersonal, CBT. The focus of today's session was on working in his current situation and how he can organize  himself for next few months.    Response to Intervention:  Client has perceived a legit need for change in his life. He has been reflecting on how he can change his life in order to make a step for a new fase, especially leaving his current home (sober house). He feels overwhelmed and struggling to organize himself in this direction. The attitudes and behaviors he has had were listed and discussed to provide him a perspective. He realized that some key behaviors are still missing to keep him consistent and stable. It was established with him some small steps so that he can take care in the next times to reach that stability.    Assignment:  - Continue attending CSB and AA group (permanent assignment).  - Continue applying sleep hygiene using a sleep hygiene handout and what was discussed in session. The result will be evaluated in a future session. 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 Continue weekly group therapy.     Shireen Pratt, PhD, Postdoctoral Fellow

## 2020-02-04 NOTE — PROGRESS NOTES
IOP CO-OCCURRING GROUP SESSION NOTE     Attendance    Tuesday    Group Date: 1/28/2020 - 12:30 PM - 1:30 PM   Group Attendance Attended Phase 3 Session - Individual   Group Therapy Type Addiction, Life skill(s)    Group Topic Covered Cognitive Therapy Techniques, Disease of Addiction/Choices in Recovery, Goal Setting    Client's Response To Group Topic Cooperative with task Discussed personal experience with topic   Client Group Participation Detail Highly involved   Group Attendance (Time) None   Individual Attendance 1.0 Hour - Held as individual session due to attendance of one client   Family Attendance None   Other Comments/Information Met with client for first Phase 3 session. Reviewed Phase 3 goals and expectations. Reviewed CBT approaches to [id number] as strategy to help manage feelings of overwhelm that increase risk of compulsive behaviors. Reviewed personal goals, with emphasis on small initial steps to gain information.

## 2020-02-04 NOTE — PROGRESS NOTES
Treatment Plan Review  Treatment Plan Review completed on:  1/28/2020   Projected discharge date: 4/01/2020     Client preferred learning style:   Visual  Hands on  Verbal    Staff member(s) contributing: Lucille Castellanos MFA, MA, LADC  Received supervision: None this review period.  Client involvement with treatment planning: contributed to goals and plan.  Client received copy of plan/revised plan: Yes -   Client agrees with plan/revised plan: Yes  Changes to Treatment Plan: No changes to Treatment Plan; Phase 3 Goals to be further reviewed and Treatment Plan modified.  Last Date of Use: Client reports last date of use for alcohol as 1/12/2020.   New Goals added since last review: None.   Goal(s) worked on since last review: Yes. Client focused on Dimension 3 Goal: gaining knowledge and ability to manage symptoms of anxiety and depression; Dimension 5  Goal: increase resiliency; and Dimension 4 Goal: Address ambivalence regarding substance use and alternative coping skills.  Strategies effective: Yes -   Treatment Coordination Activities: Yes - Case review with Holger Braga MA, HARPRE[place], [place].       Weekly Progress Summary  Program Attendance:  Total # of Phase 1 Group Sessions: 34                                  Total # of Phase 2 Group Sessions: 8  Total # of Phase 3 Group Sessions: 1  Total # of 1:1 Sessions: 7     Support group attended this week: Yes - client reports attending one recovery support group.  Reporting sobriety:   Client reports last date of use: 1/12/2020.   Medical, Mental Health and other appointments the client attended:   Client continues in treatment and therapy with [facility], attending one weekly group session and one individual therapy session.    Medication issues: Client denies concerns with prescribed medications.  Physical and mental health problems: Yes -   Mental Health diagnosis provided below. Client reports continued episodes for alcohol use, but reports  minimal use during incidents of sexually compulsive and gambling behaviors.   Review and evaluation of the individual abuse prevention plan: The program's individual abuse prevention plan (IAPP) is sufficient for this client.      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 Client has history of continuing behaviors that impede his treatment and recovery.   Client did not report ratings for symptoms of PAWS.  Dimension 2 2 Client rates overall health at 7/10. Client reports continued struggle with sleep.   Dimension 3 3 Client denies SI, Intent, thoughts of self-harm or harm to others. Client did not provide ratings on symptoms for assessment of mental health.   Dimension 4  4 Client rates his motivation and commitment for sobriety each at 5/10. Client reports that focusing on his attitude of helplessness as obstacle to his recovery.  Dimension 5 4 Client rates current cravings at 2/10. He reports that problems with car was significant stressor, causing feelings of frustration.   Dimension 6 3 On scale of 0-10, with 0 being supportive and 10 being highly stressful, client rates current living situation at 6/10. Client has reported previously goal to seek alternative sober housing, but he does stay consistent with intention.       Lucille Castellanos MFA, MA, [facility]  January 28, 2020

## 2020-02-06 ENCOUNTER — OFFICE VISIT (OUTPATIENT)
Dept: OTHER | Facility: OUTPATIENT CENTER | Age: 40
End: 2020-02-06
Payer: COMMERCIAL

## 2020-02-06 DIAGNOSIS — F41.1 GENERALIZED ANXIETY DISORDER: ICD-10-CM

## 2020-02-06 DIAGNOSIS — F43.10 POSTTRAUMATIC STRESS DISORDER: ICD-10-CM

## 2020-02-06 DIAGNOSIS — F33.0 MAJOR DEPRESSIVE DISORDER, RECURRENT EPISODE, MILD (H): ICD-10-CM

## 2020-02-06 DIAGNOSIS — F10.21 ALCOHOL USE DISORDER, MODERATE, IN EARLY REMISSION, DEPENDENCE (H): ICD-10-CM

## 2020-02-06 DIAGNOSIS — F91.8 OTHER CONDUCT DISORDERS: Primary | ICD-10-CM

## 2020-02-07 NOTE — PROGRESS NOTES
[facility] - [facility] Progress Note  Date of Service: 2/06/20  Client Name: Shawn Camejo  YOB: 1980  MRN:  [id number]  Treating Provider: Venita Lares, PhD and Shireen Pratt, PhD, Postdoctoral Fellow  Type of Session: Group  Number of Minutes: 120 min              Health Maintenance Summary - Mental Health Treatment Plan        Status Date       MENTAL HEALTH TX PLAN Next Due 8/11/2020         Done 9/11/2019 [facility] MENTAL HEALTH TX PLAN SCAN       Done 9/28/2018            Current Symptoms/Status:     Compulsive Sexual Behavior: Pattern of engaging in compulsive sexual behaviors causing depression, worry, critical internal messages about his sex. Thoughts and urges to act out sexually on a daily basis. Increased masturbatory frequency, long periods of activity. Client was unable to stay within his sexual boundaries.      Depression: The current status is permanent moderate depressive state. He endorses the following depression symptoms: ? Self-Esteem/Guilt; Depressed/Hopeless and Sleeping Difficulties.     Alcohol Use Disorder: Pattern of alcohol use that is excessive, causing anxiety interferes with social and emotional relationships, thoughts, and urges to drink alcohol. Client was unable to stay within his alcohol boundaries     Anxiety: He endorses the following anxiety symptoms: Anxiety/ Worry; Fear/Dread; Nervous; Intrusive Thoughts; Fear/Loss Control; Trouble relaxing; Easily Annoyed; and Agitation/Restlessness.    Posttraumatic Stress Disorder: Not being diagnosed today.    Progress Toward Treatment Goals:   Client continues to show interest and commitment towards receiving services at [facility].  Client joined CSB group 4/20/19. Remains motivated to continue with group sessions.  He is in an AA treatment group at [facility] (missed his individual session, he avoided).  Continued working on his assignments.  Client was able to keeping his week  goals for almost 2 months.    Intervention: Modality and Description:  Utilized supportive and CBT techniques were used to address CSB and related mental health issues. The focus of today's session was on talking about his coping styles in order to deal with a relapse episode.    Response to Intervention:  Client shared some fears regarding his progress. He noted that when he progressing with his problems, responsibilities increase as well as challenges. This has upset and caused a lot of anxiety on him. He also realized that relapses have a different meaning.Interventions on how he can reframe his difficulties and the social support provided by the group have brought him understanding and insights on how he can move on.     Assignment:  - Continue attending CSB and AA group (permanent assignment).     Interactive Complexity:  N/A      Diagnosis:  312.89 (F91.8) Other Specified Disruptive, Impulsive-Control, and Conduct Disorder (hypersexuality)   296.31 (F33.0)  Major Depressive Disorder, mild, recurrent   300.02 (F41.1)  Generalized Anxiety Disorder   309.81 (F43.10) Posttraumatic Stress Disorder   303.90 (F10.20) Alcohol Use Disorder      Plan / Need for Future Services:  Return for individual therapy every week to address CSB. Continue weekly group therapy.     Shireen Pratt, PhD, Postdoctoral Fellow

## 2020-02-07 NOTE — PROGRESS NOTES
[facility] -  Case Progress Note    Date of Service: 2/06/20  Client Name: Shawn Camejo  YOB: 1980  MRN:  [id number]  Treating Provider: Shireen Pratt, PhD, Postdoctoral Fellow  Type of Session: Individual  Present in Session: Client only  Number of Minutes: 53 min    Health Maintenance Summary - Mental Health Treatment Plan       Status Date      MENTAL HEALTH TX PLAN Next Due 8/11/2020      Done 9/11/2019 [facility] MENTAL HEALTH TX PLAN SCAN     Done 9/28/2018           Current Symptoms/Status:    Compulsive Sexual Behavior: Pattern of engaging in compulsive sexual behaviors causing depression, worry, critical internal messages about his sexuality. Thoughts and urges to act out sexually on a daily basis. Client was unable to stay within his sexual boundaries.     Alcohol Use Disorder: Pattern of alcohol use that is excessive, causing anxiety, worry, interferes with social and emotional relationships. Thoughts, and urges to drink alcohol in social situations. Client was unable to stay within his alcohol boundaries.     Anxiety: He endorses the following anxiety symptoms: Anxious; Nervous; Easily Annoyed; Fear/Loss Control; Trouble relaxing; and Fear/Dread.    Depression: The current status is permanent moderate depressive state. He endorses the following depression symptoms: ? Self-Esteem/Guilt; Depressed/Hopeless and Sleeping Difficulties.      Progress Toward Treatment Goals:   Session # 13 - Client continues to show interest and commitment towards receiving services at [facility].  [facility]ed his individual session, he avoided).  Continued working on his assignments.  Client was able to keeping his week goals for almost 2 months.    Intervention: Modality and Description:  Individual, interpersonal, CBT. The focus of today's session was on working in his negative behaviors, emotions and thoughts.    Response  to Intervention:  Client has been more aware of his reality and this has generated an intense anxiety response. He feels happy about being able to maintain stability for almost two months, but he still feels fragile when he needs to face negative moments. He has been able to change negative behaviors, but he still has a hard time dealing with negative emotions and thoughts. For the thoughts, he described that he has had some success, especially in thoughts connected with his sexual compulsiveness. When reflecting on his relapse episode, he stated that the way he relapsed was different. Comparatively, he said that he did not lose control of the situation and because of that, he did less harm to himself.    Assignment:  - Continue attending CSB and AA group (permanent assignment).  - Continue applying sleep hygiene using a sleep hygiene handout and what was discussed in session. The result will be evaluated in a future session. 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 Continue weekly group therapy.     Shireen Pratt, PhD, Postdoctoral Fellow

## 2020-02-21 ENCOUNTER — TELEPHONE (OUTPATIENT)
Dept: OTHER | Facility: OUTPATIENT CENTER | Age: 40
End: 2020-02-21

## 2020-02-21 NOTE — TELEPHONE ENCOUNTER
I'm tried to reach Shawn because he has not been shown on [facility] for two weeks. I will try more times until I make contact.

## 2020-02-26 ENCOUNTER — TELEPHONE (OUTPATIENT)
Dept: OTHER | Facility: OUTPATIENT CENTER | Age: 40
End: 2020-02-26

## 2020-02-26 NOTE — TELEPHONE ENCOUNTER
I've been tried everday to reach Shawn from Monday to Wednesday because he has not been shown on [facility] for two weeks.

## 2020-03-05 ENCOUNTER — TELEPHONE (OUTPATIENT)
Dept: OTHER | Facility: OUTPATIENT CENTER | Age: 40
End: 2020-03-05

## 2020-03-05 NOTE — TELEPHONE ENCOUNTER
Pt cannot be reached and phone number no longer belongs to them so it was removed from the chart. It was decided amongst admin and Dr. Pratt to cancel all [facility] future appointments due to excessive no shows, and also so the patient will no longer accrue late cancel fees/no show. If patient calls back please collect new contact info and direct to Dr. Pratt to make a plan for how to schedule new appointments if they call back and are interested.
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
## 2020-06-05 ENCOUNTER — VIRTUAL VISIT (OUTPATIENT)
Dept: FAMILY MEDICINE | Facility: OTHER | Age: 40
End: 2020-06-05

## 2020-06-05 ENCOUNTER — AMBULATORY - HEALTHEAST (OUTPATIENT)
Dept: FAMILY MEDICINE | Facility: CLINIC | Age: 40
End: 2020-06-05

## 2020-06-05 DIAGNOSIS — Z20.822 SUSPECTED COVID-19 VIRUS INFECTION: ICD-10-CM

## 2020-06-05 NOTE — PROGRESS NOTES
"Date: 2020 10:55:34  Clinician: Chante Cunningham  Clinician NPI: [id number]  Patient: Shawn Camejo  Patient : 1980  Patient Address: [address] [place], [place] [id number]  Patient Phone: [phone]  Visit Protocol: URI  Patient Summary:  Shawn is a 39 year old ( : 1980 ) male who initiated a Visit for COVID-19 (Coronavirus) evaluation and screening. When asked the question \"Please sign me up to receive news, health information and promotions from [employer].\", Shawn responded \"No\".    Shawn states his symptoms started gradually 1 month or more ago. After his symptoms started, they improved and then got worse again.   His symptoms consist of a sore throat, malaise, myalgia, a cough, rhinitis, and a headache. He is experiencing mild difficulty breathing with activities but can speak normally in full sentences.   Symptom details     Nasal secretions: The color of his mucus is clear.    Cough: Shawn coughs every 5-10 minutes and his cough is more bothersome at night. Phlegm does not come into his throat when he coughs. He does not believe his cough is caused by post-nasal drip.     Sore throat: Shawn reports having moderate throat pain (4-6 on a 10 point pain scale), does not have exudate on his tonsils, and can swallow liquids. The lymph nodes in his neck are not enlarged. A rash has not appeared on the skin since the sore throat started.     Headache: Shawn has not had the headache for 12 weeks or more. He states the headache is moderate (4-6 on a 10 point pain scale).      Shawn denies having wheezing, nausea, teeth pain, ageusia, diarrhea, enlarged lymph nodes, anosmia, facial pain or pressure, fever, nasal congestion, vomiting, ear pain, and chills. He also denies having recent facial or sinus surgery in the past 60 days, taking antibiotic medication for the symptoms, and having a sinus infection within the past year.   Precipitating events  Shawn is not sure if he has been " exposed to someone with strep throat. He has not recently been exposed to someone with influenza. Shawn has not been in close contact with any high risk individuals.   Pertinent COVID-19 (Coronavirus) information  In the past 14 days, Shawn has not worked in a congregate living setting.   He does not work or volunteer as healthcare worker or a  and does not work or volunteer in a healthcare facility.   Shawn has lived in a congregate living setting in the past 14 days. He does not live with a healthcare worker.   Shawn has not had a close contact with a laboratory-confirmed COVID-19 patient within 14 days of symptom onset.   Pertinent medical history  Shawn does not need a return to work/school note.   Weight: 200 lbs   Shawn does not smoke or use smokeless tobacco.   Additional information as reported by the patient (free text): I have  stopped  taking  my antidepressant medications  about  two  months ago   Weight: 200 lbs    MEDICATIONS: No current medications, ALLERGIES: NKDA  Clinician Response:  Dear Shawn,  Based on the information you have provided, you have Allergic Rhinitis commonly called hay fever or seasonal allergy.  Medication information  I am prescribing:     Fluticasone 50 mcg/actuation nasal spray. Inhale 2 sprays in each nostril 1 time per day; after 1 week, may adjust to 1 - 2 sprays in each nostril 1 time per day. This medication takes several days to start working, so keep taking it even if it doesn't help right away. There are no refills with this prescription.   Unless you are allergic to the over-the-counter medication(s) below, I recommend using:   An antihistamine such as Allegra, Claritin, Zyrtec, or store brand. Please follow the instructions on the package.   Over-the-counter medications do not require a prescription. Ask the pharmacist if you have any questions.  Self care  Steps you can take to be as comfortable as possible:     Take a warm shower to  loosen congestion    Use a cool-mist humidifier.    Use throat lozenges.    Suck on frozen items such as popsicles.    Drink hot tea with lemon and honey.    Gargle with warm salt water (1/4 teaspoon of salt per 8 ounce glass of water).    Take a spoonful of honey to reduce your cough.    Avoid substances you are allergic to.    Try to reduce the amount of humidity in your living and working environments.    Consider moving pets outside of your living and/or working area.    You can decrease your allergy symptoms by staying indoors as much as possible until your allergy season is over.     When to seek care  Please be seen in a clinic or urgent care if any of the following occur:   New symptoms develop, or symptoms become worse   Call ahead before going to the clinic or urgent care.  Call 911 or go to the emergency room if you feel that your throat is closing off, you suddenly develop a rash, you are unable to swallow fluids, you are drooling, or you are having difficulty breathing.  Additional treatment plan   Your symptoms show that you may have coronavirus (COVID-19). This illness can cause fever, cough and trouble breathing. Many people get a mild case and get better on their own. Some people can get very sick.  What should I do?  We would like to test you for this virus. This will be a curbside test done outside the clinic.   1. Please call [phone] to schedule your visit. Explain that you were referred by [facility] to have a COVID-19 test. Be ready to share your [facility] visit ID number.  The following will serve as your written order for this COVID Test, ordered by me, for the indication of suspected COVID [Z20.828]: The test will be ordered in [employer] [facility], our electronic health record, after you are scheduled. It will show as ordered and authorized by Samson Melo MD.  Order: COVID-19 (Coronavirus) PCR for SYMPTOMATIC testing from [facility].    2. When it's time for your COVID test:  Stay at least 6 feet away from  "others. (If someone will drive you to your test, stay in the backseat, as far away from the  as you can.)   Cover your mouth and nose with a mask, tissue or washcloth.  Go straight to the testing site. Don't make any stops on the way there or back.    3.Starting now: Stay home and away from others (self-isolate) until:   You've had no fever---and no medicine that reduces fever---for 3 full days (72 hours). And...  Your other symptoms have gotten better. For example, your cough or breathing has improved. And...  At least 10 days have passed since your symptoms started.    During this time, don't leave the house except for testing or medical care.   Stay in your own room, even for meals. Use your own bathroom if you can.   Stay away from others in your home. No hugging, kissing or shaking hands. No visitors.  Don't go to work, school or anywhere else.    Clean \"high touch\" surfaces often (doorknobs, counters, handles, etc.). Use a household cleaning spray or wipes. You'll find a full list of  on the EPA website: www.epa.gov/pesticide-registration/list-n-disinfectants-use-against-sars-cov-2.   Cover your mouth and nose with a mask, tissue or washcloth to avoid spreading germs.  Wash your hands and face often. Use soap and water.  Caregivers in these groups are at risk for severe illness due to COVID-19:  o People 65 years and older  o People who live in a nursing home or long-term care facility  o People with chronic disease (lung, heart, cancer, diabetes, kidney, liver, immunologic)  o People who have a weakened immune system, including those who:   Are in cancer treatment  Take medicine that weakens the immune system, such as corticosteroids  Had a bone marrow or organ transplant  Have an immune deficiency  Have poorly controlled HIV or AIDS  Are obese (body mass index of 40 or higher)  Smoke regularly   o Caregivers should wear gloves while washing dishes, handling laundry and cleaning bedrooms and " bathrooms.  o Use caution when washing and drying laundry: Don't shake dirty laundry, and use the warmest water setting that you can.  o For more tips, go to www.cdc.gov/coronavirus/2019-ncov/downloads/10Things.pdf.    How can I take care of myself?   Get lots of rest. Drink extra fluids(unless a doctor has told you not to).  Take Tylenol (acetaminophen) for fever or pain. If you have liver or kidney problems, ask your family doctor if it's okay to take Tylenol.   Adults can take either:    650 mg (two 325 mg pills) every 4 to 6 hours, or...  1,000 mg (two 500 mg pills) every 8 hours as needed.   Note: Don't take more than 3,000 mg in one day. Acetaminophen is found in many medicines (both prescribed and over-the-counter medicines). Read all labels to be sure you don't take too much.   For children, check the Tylenol bottle for the right dose. The dose is based on the child's age or weight.    If you have other health problems (like cancer, heart failure, an organ transplant or severe kidney disease):Call your specialty clinic if you don't feel better in the next 2 days.    Know when to call 911. Emergency warning signs include:   Trouble breathing or shortness of breath Pain or pressure in the chest that doesn't go away Feeling confused like you haven't felt before, or not being able to wake up Bluish-colored lips or face  5.Sign up for "[employer] We know it's scary to hear that you might have COVID-19. We want to track your symptoms to make sure you're okay over the next 2 weeks. Please look for an email from "[employer]"---this is a free, online program that we'll use to keep in touch. To sign up, follow the link in the email. Learn more at [email or web address].    Where can I get more information?    [facility] -- About COVID-19: [email or web address]  CDC -- What to Do If You're Sick: www.cdc.gov/coronavirus/2019-ncov/about/steps-when-sick.html  CDC -- Ending Home Isolation:  www.cdc.gov/coronavirus/2019-ncov/hcp/disposition-in-home-patients.html  [facility] -- Caring for Someone: www.cdc.gov/coronavirus/2019-ncov/if-you-are-sick/care-for-someone.html  [facility] -- Interim Guidance for Hospital Discharge to Home: www.[facility].[place]/diseases/coronavirus/hcp/hospdischarge.pdf  [facility] clinical trials (COVID-19 research studies): clinicalaffairs.[facility]/umn-clinical-trials  Below are the COVID-19 hotlines at the [place] Department of Health ([facility]). Interpreters are available.    For health questions: Call 395-086-1123 or 1-290.985.9345 (7 a.m. to 7 p.m.) For questions about schools and childcare: Call 696-491-6571 or 1-998.186.2029 (7 a.m. to 7 p.m.)    Diagnosis: Acute pharyngitis, unspecified  Diagnosis ICD: J02.9  Prescription: fluticasone 50 mcg/actuation nasal spray,suspension 1 120 spray aerosol with adapter (grams), 30 days supply. Inhale 2 sprays in each nostril 1 time per day; after 1 week, may adjust to 1 - 2 sprays in each nostril 1 time per day.. Refills: 0, Refill as needed: no, Allow substitutions: yes

## 2020-06-07 ENCOUNTER — COMMUNICATION - HEALTHEAST (OUTPATIENT)
Dept: FAMILY MEDICINE | Facility: CLINIC | Age: 40
End: 2020-06-07

## 2021-06-20 NOTE — LETTER
Letter by Kait Stone RN at      Author: Kait Stone RN Service: -- Author Type: --    Filed:  Encounter Date: 6/7/2020 Status: (Other)       6/7/2020        Shawn JENNIFER Camejo  [address]  [place] [address]    This letter provides a written record that you were tested for COVID-19 on 6/5/2020.     Your result was negative.    This means that we didnt find the virus that causes COVID-19 in your sample. A test may show negative when you do actually have the virus. This can happen when the virus is in the early stages of infection, before you feel illness symptoms.    Even if you dont have symptoms, they may still appear. For safety, its very important to follow these rules.    Keep yourself away from others (self-isolation):      Stay home. Dont go to work, school or anywhere else.     Stay in your own room (and use your own bathroom), if you can.    Stay away from others in your home. No hugging, kissing or shaking hands. No visitors.    Clean high touch surfaces often (doorknobs, counters, handles, etc.). Use a household cleaning spray or wipes.    Cover your mouth and nose with a mask, tissue or washcloth to avoid spreading germs.    Wash your hands and face often with soap and water.    Stay in self-isolation until you meet ALL of the guidelines below:    1. You have had no fever for at least 72 hours (that is 3 full days of no fever without the use of medicine that reduces fevers), AND  2. other symptoms (such as cough, shortness of breath) have gotten better, AND  3. at least 10 days have passed since your symptoms first appeared.    Going back to work  Check with your employer for any guidelines to follow for going back to work.    Employers: This document serves as formal notice that your employee tested negative for COVID-19, as of the testing date shown above.    For questions regarding this letter or your Negative COVID-19 result, call [phone] between 8A to 6:30P (M-F) and 10A  to 6:30P (weekends).

## 2022-05-03 NOTE — ADDENDUM NOTE
Encounter addended by: Holger Braga LMFT, [facility] on: 10/17/2019 10:55 AM   Actions taken: Pend clinical note, Clinical Note Signed
yes

## 2022-06-05 NOTE — MR AVS SNAPSHOT
After Visit Summary   9/7/2018    Shawn Camejo    MRN: [id number]           Patient Information     Date Of Birth          1980        Visit Information        Provider Department      9/7/2018 10:30 AM Lux Bravo PhD [facility]        Today's Diagnoses     Major depressive disorder, recurrent episode, mild (H)    -  1    Unspecified disturbance of conduct           Follow-ups after your visit        Your next 10 appointments already scheduled     Sep 28, 2018  9:30 AM CDT   Individual Therapy 53+ minutes with Lux Bravo PhD   [facility] ([facility])    [address]  [address]  [facility] [address]   [phone]            Oct 12, 2018  9:30 AM CDT   Individual Therapy 53+ minutes with Lux Bravo PhD   [facility] ([facility])    [address]  [address]  [facility] [address]   [phone]            Oct 26, 2018  9:30 AM CDT   Individual Therapy 53+ minutes with Lux Bravo PhD   [facility] ([facility])    [address]  [address]  [facility] [address]   [phone]              Who to contact     Please call your clinic at [phone] to:    Ask questions about your health    Make or cancel appointments    Discuss your medicines    Learn about your test results    Speak to your doctor            Additional Information About Your Visit        MyChart Information     [employer] is an electronic gateway that provides easy, online access to your medical records. With [employer], you can request a clinic appointment, read your test results, renew a prescription or communicate with your care team.     To sign up for [employer] visit the website at [email or web address]   You will be asked to enter the access code listed below, as well as some personal information. Please follow the directions to create your username and password.     Your access  code is: [email or web address]  Expires: 2018  5:42 PM     Your access code will  in 90 days. If you need help or a new code, please contact your [facility] or call [phone] for assistance.        Care EveryWhere ID     This is your Care EveryWhere ID. This could be used by other organizations to access your [facility] medical records  [id number]         Blood Pressure from Last 3 Encounters:   01 110/80   01 112/72    Weight from Last 3 Encounters:   01 68.4 kg (150 lb 12 oz)   01 69.4 kg (153 lb)              We Performed the Following     C PSYCHIATRIC DIAGNOSTIC EVALUATION        Primary Care Provider Office Phone # Fax #    Poli Mukherjee [phone]       NO INFO FOUND [address]  [place] [address]        Equal Access to Services     [facility]: Hadii aad ku hadasho Soomaali, waaxda luqadaha, qaybta kaalmada adeegyada, waxay zarain hayaan adeparadise jamison . So [facility] [phone].    ATENCIÓN: Si habla español, tiene a kebede disposición servicios gratuitos de asistencia lingüística. Llame al [phone].    We comply with applicable federal civil rights laws and [place] laws. We do not discriminate on the basis of race, color, national origin, age, disability, sex, sexual orientation, or gender identity.            Thank you!     Thank you for choosing [facility]  for your care. Our goal is always to provide you with excellent care. Hearing back from our patients is one way we can continue to improve our services. Please take a few minutes to complete the written survey that you may receive in the mail after your visit with us. Thank you!             Your Updated Medication List - Protect others around you: Learn how to safely use, store and throw away your medicines at www.disposemymeds.org.      Notice  As of 2018 11:59 PM    You have not been prescribed any medications.       Yes
